# Patient Record
Sex: MALE | Race: WHITE | NOT HISPANIC OR LATINO | Employment: STUDENT | URBAN - METROPOLITAN AREA
[De-identification: names, ages, dates, MRNs, and addresses within clinical notes are randomized per-mention and may not be internally consistent; named-entity substitution may affect disease eponyms.]

---

## 2017-02-28 ENCOUNTER — HOSPITAL ENCOUNTER (INPATIENT)
Facility: HOSPITAL | Age: 22
LOS: 5 days | Discharge: HOME | DRG: 871 | End: 2017-03-05
Attending: EMERGENCY MEDICINE | Admitting: INTERNAL MEDICINE
Payer: COMMERCIAL

## 2017-02-28 DIAGNOSIS — R50.9 FEVER: ICD-10-CM

## 2017-02-28 DIAGNOSIS — Z99.11 ON MECHANICALLY ASSISTED VENTILATION: ICD-10-CM

## 2017-02-28 DIAGNOSIS — G40.311 INTRACTABLE GENERALIZED IDIOPATHIC EPILEPSY WITH STATUS EPILEPTICUS: ICD-10-CM

## 2017-02-28 DIAGNOSIS — J02.0 STREP PHARYNGITIS: ICD-10-CM

## 2017-02-28 DIAGNOSIS — A41.9 SEPSIS, DUE TO UNSPECIFIED ORGANISM: Primary | ICD-10-CM

## 2017-02-28 DIAGNOSIS — J69.0 ASPIRATION PNEUMONIA, UNSPECIFIED ASPIRATION PNEUMONIA TYPE, UNSPECIFIED LATERALITY, UNSPECIFIED PART OF LUNG: ICD-10-CM

## 2017-02-28 DIAGNOSIS — R06.03 RESPIRATORY DISTRESS: ICD-10-CM

## 2017-02-28 DIAGNOSIS — R79.0 LOW SERUM PHOSPHORUS FOR AGE: ICD-10-CM

## 2017-02-28 DIAGNOSIS — J69.0 ASPIRATION PNEUMONIA OF BOTH UPPER LOBES DUE TO GASTRIC SECRETIONS: ICD-10-CM

## 2017-02-28 DIAGNOSIS — G40.909 NONINTRACTABLE EPILEPSY WITHOUT STATUS EPILEPTICUS, UNSPECIFIED EPILEPSY TYPE: ICD-10-CM

## 2017-02-28 DIAGNOSIS — R56.9 SEIZURE: ICD-10-CM

## 2017-02-28 DIAGNOSIS — A41.9 SEPSIS: ICD-10-CM

## 2017-02-28 DIAGNOSIS — R62.50 DEVELOPMENTAL DELAY: ICD-10-CM

## 2017-02-28 DIAGNOSIS — F84.0 AUTISM: ICD-10-CM

## 2017-02-28 LAB
ABO + RH BLD: NORMAL
ALBUMIN SERPL BCP-MCNC: 1.6 G/DL
ALBUMIN SERPL BCP-MCNC: 2.8 G/DL
ALBUMIN SERPL BCP-MCNC: 3 G/DL
ALBUMIN SERPL BCP-MCNC: 3.1 G/DL
ALLENS TEST: ABNORMAL
ALP SERPL-CCNC: 49 U/L
ALP SERPL-CCNC: 78 U/L
ALP SERPL-CCNC: 86 U/L
ALP SERPL-CCNC: 88 U/L
ALP SERPL-CCNC: 94 U/L
ALP SERPL-CCNC: 94 U/L
ALT SERPL W/O P-5'-P-CCNC: 20 U/L
ALT SERPL W/O P-5'-P-CCNC: 41 U/L
ALT SERPL W/O P-5'-P-CCNC: 43 U/L
ALT SERPL W/O P-5'-P-CCNC: 43 U/L
ALT SERPL W/O P-5'-P-CCNC: 45 U/L
ALT SERPL W/O P-5'-P-CCNC: 45 U/L
ANION GAP SERPL CALC-SCNC: 3 MMOL/L
ANION GAP SERPL CALC-SCNC: 5 MMOL/L
ANION GAP SERPL CALC-SCNC: 6 MMOL/L
ANION GAP SERPL CALC-SCNC: 7 MMOL/L
ANISOCYTOSIS BLD QL SMEAR: SLIGHT
APTT BLDCRRT: 27.5 SEC
AST SERPL-CCNC: 24 U/L
AST SERPL-CCNC: 49 U/L
AST SERPL-CCNC: 52 U/L
AST SERPL-CCNC: 58 U/L
AST SERPL-CCNC: 58 U/L
AST SERPL-CCNC: 65 U/L
BASOPHILS # BLD AUTO: ABNORMAL K/UL
BASOPHILS NFR BLD: 0 %
BILIRUB SERPL-MCNC: 0.3 MG/DL
BILIRUB SERPL-MCNC: 0.5 MG/DL
BILIRUB SERPL-MCNC: 0.6 MG/DL
BILIRUB SERPL-MCNC: 0.8 MG/DL
BILIRUB UR QL STRIP: NEGATIVE
BLD GP AB SCN CELLS X3 SERPL QL: NORMAL
BUN SERPL-MCNC: 11 MG/DL
BUN SERPL-MCNC: 11 MG/DL
BUN SERPL-MCNC: 12 MG/DL
BUN SERPL-MCNC: 7 MG/DL
BUN SERPL-MCNC: 8 MG/DL
BUN SERPL-MCNC: 9 MG/DL
CA-I BLDV-SCNC: 1.08 MMOL/L
CALCIUM SERPL-MCNC: 4 MG/DL
CALCIUM SERPL-MCNC: 6.8 MG/DL
CALCIUM SERPL-MCNC: 6.9 MG/DL
CALCIUM SERPL-MCNC: 6.9 MG/DL
CALCIUM SERPL-MCNC: 7.1 MG/DL
CALCIUM SERPL-MCNC: 7.2 MG/DL
CHLORIDE SERPL-SCNC: 111 MMOL/L
CHLORIDE SERPL-SCNC: 113 MMOL/L
CHLORIDE SERPL-SCNC: 114 MMOL/L
CHLORIDE SERPL-SCNC: 114 MMOL/L
CHLORIDE SERPL-SCNC: 116 MMOL/L
CHLORIDE SERPL-SCNC: 129 MMOL/L
CLARITY UR REFRACT.AUTO: CLEAR
CO2 SERPL-SCNC: 11 MMOL/L
CO2 SERPL-SCNC: 15 MMOL/L
CO2 SERPL-SCNC: 17 MMOL/L
CO2 SERPL-SCNC: 17 MMOL/L
CO2 SERPL-SCNC: 19 MMOL/L
CO2 SERPL-SCNC: 19 MMOL/L
COLOR UR AUTO: YELLOW
CREAT SERPL-MCNC: 0.4 MG/DL
CREAT SERPL-MCNC: 0.6 MG/DL
CREAT SERPL-MCNC: 0.7 MG/DL
DELSYS: ABNORMAL
DIFFERENTIAL METHOD: ABNORMAL
EOSINOPHIL # BLD AUTO: ABNORMAL K/UL
EOSINOPHIL NFR BLD: 0 %
ERYTHROCYTE [DISTWIDTH] IN BLOOD BY AUTOMATED COUNT: 13.5 %
ERYTHROCYTE [DISTWIDTH] IN BLOOD BY AUTOMATED COUNT: 13.6 %
ERYTHROCYTE [DISTWIDTH] IN BLOOD BY AUTOMATED COUNT: 13.6 %
ERYTHROCYTE [SEDIMENTATION RATE] IN BLOOD BY WESTERGREN METHOD: 16 MM/H
ERYTHROCYTE [SEDIMENTATION RATE] IN BLOOD BY WESTERGREN METHOD: 24 MM/H
EST. GFR  (AFRICAN AMERICAN): >60 ML/MIN/1.73 M^2
EST. GFR  (NON AFRICAN AMERICAN): >60 ML/MIN/1.73 M^2
FIO2: 100
FIO2: 50
GLUCOSE SERPL-MCNC: 103 MG/DL
GLUCOSE SERPL-MCNC: 103 MG/DL
GLUCOSE SERPL-MCNC: 69 MG/DL
GLUCOSE SERPL-MCNC: 82 MG/DL
GLUCOSE SERPL-MCNC: 87 MG/DL
GLUCOSE SERPL-MCNC: 98 MG/DL
GLUCOSE UR QL STRIP: NEGATIVE
HCO3 UR-SCNC: 18.2 MMOL/L (ref 24–28)
HCO3 UR-SCNC: 18.8 MMOL/L (ref 24–28)
HCO3 UR-SCNC: 19.8 MMOL/L (ref 24–28)
HCT VFR BLD AUTO: 21.1 %
HCT VFR BLD AUTO: 33.8 %
HCT VFR BLD AUTO: 34.2 %
HGB BLD-MCNC: 11.1 G/DL
HGB BLD-MCNC: 11.4 G/DL
HGB BLD-MCNC: 6.9 G/DL
HGB UR QL STRIP: NEGATIVE
INR PPP: 1.2
KETONES UR QL STRIP: NEGATIVE
LACTATE SERPL-SCNC: 2.4 MMOL/L
LACTATE SERPL-SCNC: 2.5 MMOL/L
LEUKOCYTE ESTERASE UR QL STRIP: NEGATIVE
LYMPHOCYTES # BLD AUTO: ABNORMAL K/UL
LYMPHOCYTES NFR BLD: 10 %
LYMPHOCYTES NFR BLD: 16 %
LYMPHOCYTES NFR BLD: 4 %
MAGNESIUM SERPL-MCNC: 1.9 MG/DL
MAGNESIUM SERPL-MCNC: 2.1 MG/DL
MAGNESIUM SERPL-MCNC: <0.7 MG/DL
MCH RBC QN AUTO: 30.3 PG
MCH RBC QN AUTO: 30.7 PG
MCH RBC QN AUTO: 30.8 PG
MCHC RBC AUTO-ENTMCNC: 32.7 %
MCHC RBC AUTO-ENTMCNC: 32.8 %
MCHC RBC AUTO-ENTMCNC: 33.3 %
MCV RBC AUTO: 92 FL
MCV RBC AUTO: 93 FL
MCV RBC AUTO: 94 FL
METAMYELOCYTES NFR BLD MANUAL: 1 %
METAMYELOCYTES NFR BLD MANUAL: 4 %
MIN VOL: 7.01
MIN VOL: 9.01
MODE: ABNORMAL
MODE: ABNORMAL
MONOCYTES # BLD AUTO: ABNORMAL K/UL
MONOCYTES NFR BLD: 1 %
MONOCYTES NFR BLD: 2 %
MONOCYTES NFR BLD: 4 %
NEUTROPHILS NFR BLD: 65 %
NEUTROPHILS NFR BLD: 69 %
NEUTROPHILS NFR BLD: 80 %
NEUTS BAND NFR BLD MANUAL: 13 %
NEUTS BAND NFR BLD MANUAL: 14 %
NEUTS BAND NFR BLD MANUAL: 17 %
NITRITE UR QL STRIP: NEGATIVE
PCO2 BLDA: 29.4 MMHG (ref 35–45)
PCO2 BLDA: 39 MMHG (ref 35–45)
PCO2 BLDA: 43.9 MMHG (ref 35–45)
PEEP: 5
PEEP: 5
PH SMN: 7.26 [PH] (ref 7.35–7.45)
PH SMN: 7.28 [PH] (ref 7.35–7.45)
PH SMN: 7.42 [PH] (ref 7.35–7.45)
PH UR STRIP: 7 [PH] (ref 5–8)
PIP: 20
PIP: 24
PLATELET # BLD AUTO: 122 K/UL
PLATELET # BLD AUTO: 128 K/UL
PLATELET # BLD AUTO: 71 K/UL
PLATELET BLD QL SMEAR: ABNORMAL
PMV BLD AUTO: 10.2 FL
PMV BLD AUTO: 10.4 FL
PMV BLD AUTO: 9.7 FL
PO2 BLDA: 147 MMHG (ref 80–100)
PO2 BLDA: 152 MMHG (ref 80–100)
PO2 BLDA: 263 MMHG (ref 80–100)
POC BE: -6 MMOL/L
POC BE: -7 MMOL/L
POC BE: -9 MMOL/L
POC SATURATED O2: 100 % (ref 95–100)
POC SATURATED O2: 99 % (ref 95–100)
POC SATURATED O2: 99 % (ref 95–100)
POC TCO2: 19 MMOL/L (ref 23–27)
POC TCO2: 20 MMOL/L (ref 23–27)
POC TCO2: 21 MMOL/L (ref 23–27)
POCT GLUCOSE: 101 MG/DL (ref 70–110)
POCT GLUCOSE: 91 MG/DL (ref 70–110)
POCT GLUCOSE: 92 MG/DL (ref 70–110)
POTASSIUM SERPL-SCNC: 3.7 MMOL/L
POTASSIUM SERPL-SCNC: 4.1 MMOL/L
POTASSIUM SERPL-SCNC: 4.5 MMOL/L
POTASSIUM SERPL-SCNC: 5.4 MMOL/L
POTASSIUM SERPL-SCNC: 5.4 MMOL/L
POTASSIUM SERPL-SCNC: <2 MMOL/L
PROT SERPL-MCNC: 2.8 G/DL
PROT SERPL-MCNC: 5.1 G/DL
PROT SERPL-MCNC: 5.6 G/DL
PROT SERPL-MCNC: 5.7 G/DL
PROT UR QL STRIP: ABNORMAL
PROTHROMBIN TIME: 12.8 SEC
RBC # BLD AUTO: 2.28 M/UL
RBC # BLD AUTO: 3.6 M/UL
RBC # BLD AUTO: 3.71 M/UL
SAMPLE: ABNORMAL
SITE: ABNORMAL
SODIUM SERPL-SCNC: 137 MMOL/L
SODIUM SERPL-SCNC: 138 MMOL/L
SODIUM SERPL-SCNC: 138 MMOL/L
SODIUM SERPL-SCNC: 143 MMOL/L
SP GR UR STRIP: 1.02 (ref 1–1.03)
SP02: 100
SP02: 100
URN SPEC COLLECT METH UR: ABNORMAL
UROBILINOGEN UR STRIP-ACNC: NEGATIVE EU/DL
VANCOMYCIN SERPL-MCNC: 10.7 UG/ML
VT: 360
VT: 360
WBC # BLD AUTO: 10.94 K/UL
WBC # BLD AUTO: 2.94 K/UL
WBC # BLD AUTO: 7.8 K/UL

## 2017-02-28 PROCEDURE — 87205 SMEAR GRAM STAIN: CPT

## 2017-02-28 PROCEDURE — 63600175 PHARM REV CODE 636 W HCPCS: Performed by: STUDENT IN AN ORGANIZED HEALTH CARE EDUCATION/TRAINING PROGRAM

## 2017-02-28 PROCEDURE — 99900035 HC TECH TIME PER 15 MIN (STAT)

## 2017-02-28 PROCEDURE — 25000003 PHARM REV CODE 250: Performed by: EMERGENCY MEDICINE

## 2017-02-28 PROCEDURE — 99292 CRITICAL CARE ADDL 30 MIN: CPT | Mod: ,,, | Performed by: EMERGENCY MEDICINE

## 2017-02-28 PROCEDURE — 86901 BLOOD TYPING SEROLOGIC RH(D): CPT

## 2017-02-28 PROCEDURE — 96376 TX/PRO/DX INJ SAME DRUG ADON: CPT

## 2017-02-28 PROCEDURE — 89220 SPUTUM SPECIMEN COLLECTION: CPT

## 2017-02-28 PROCEDURE — 83735 ASSAY OF MAGNESIUM: CPT

## 2017-02-28 PROCEDURE — 94761 N-INVAS EAR/PLS OXIMETRY MLT: CPT

## 2017-02-28 PROCEDURE — 82330 ASSAY OF CALCIUM: CPT

## 2017-02-28 PROCEDURE — 25000003 PHARM REV CODE 250: Performed by: ANESTHESIOLOGY

## 2017-02-28 PROCEDURE — 83735 ASSAY OF MAGNESIUM: CPT | Mod: 91

## 2017-02-28 PROCEDURE — 83605 ASSAY OF LACTIC ACID: CPT

## 2017-02-28 PROCEDURE — 5A1945Z RESPIRATORY VENTILATION, 24-96 CONSECUTIVE HOURS: ICD-10-PCS | Performed by: EMERGENCY MEDICINE

## 2017-02-28 PROCEDURE — 85027 COMPLETE CBC AUTOMATED: CPT

## 2017-02-28 PROCEDURE — 83605 ASSAY OF LACTIC ACID: CPT | Mod: 91

## 2017-02-28 PROCEDURE — 63600175 PHARM REV CODE 636 W HCPCS: Performed by: EMERGENCY MEDICINE

## 2017-02-28 PROCEDURE — 99291 CRITICAL CARE FIRST HOUR: CPT

## 2017-02-28 PROCEDURE — 27000221 HC OXYGEN, UP TO 24 HOURS

## 2017-02-28 PROCEDURE — 20000000 HC ICU ROOM

## 2017-02-28 PROCEDURE — 25000003 PHARM REV CODE 250: Performed by: INTERNAL MEDICINE

## 2017-02-28 PROCEDURE — 80053 COMPREHEN METABOLIC PANEL: CPT | Mod: 91

## 2017-02-28 PROCEDURE — 31500 INSERT EMERGENCY AIRWAY: CPT | Mod: ,,, | Performed by: EMERGENCY MEDICINE

## 2017-02-28 PROCEDURE — 94002 VENT MGMT INPAT INIT DAY: CPT

## 2017-02-28 PROCEDURE — 36600 WITHDRAWAL OF ARTERIAL BLOOD: CPT

## 2017-02-28 PROCEDURE — 02HV33Z INSERTION OF INFUSION DEVICE INTO SUPERIOR VENA CAVA, PERCUTANEOUS APPROACH: ICD-10-PCS | Performed by: EMERGENCY MEDICINE

## 2017-02-28 PROCEDURE — 87633 RESP VIRUS 12-25 TARGETS: CPT | Mod: 91

## 2017-02-28 PROCEDURE — 96365 THER/PROPH/DIAG IV INF INIT: CPT | Mod: 59

## 2017-02-28 PROCEDURE — 80175 DRUG SCREEN QUAN LAMOTRIGINE: CPT

## 2017-02-28 PROCEDURE — 25000003 PHARM REV CODE 250

## 2017-02-28 PROCEDURE — 63600175 PHARM REV CODE 636 W HCPCS

## 2017-02-28 PROCEDURE — 82803 BLOOD GASES ANY COMBINATION: CPT

## 2017-02-28 PROCEDURE — 86900 BLOOD TYPING SEROLOGIC ABO: CPT

## 2017-02-28 PROCEDURE — 80202 ASSAY OF VANCOMYCIN: CPT

## 2017-02-28 PROCEDURE — 81003 URINALYSIS AUTO W/O SCOPE: CPT

## 2017-02-28 PROCEDURE — 36415 COLL VENOUS BLD VENIPUNCTURE: CPT

## 2017-02-28 PROCEDURE — 80201 ASSAY OF TOPIRAMATE: CPT

## 2017-02-28 PROCEDURE — 85610 PROTHROMBIN TIME: CPT

## 2017-02-28 PROCEDURE — 87040 BLOOD CULTURE FOR BACTERIA: CPT

## 2017-02-28 PROCEDURE — 96368 THER/DIAG CONCURRENT INF: CPT

## 2017-02-28 PROCEDURE — 63600175 PHARM REV CODE 636 W HCPCS: Performed by: INTERNAL MEDICINE

## 2017-02-28 PROCEDURE — 99292 CRITICAL CARE ADDL 30 MIN: CPT

## 2017-02-28 PROCEDURE — 0BH17EZ INSERTION OF ENDOTRACHEAL AIRWAY INTO TRACHEA, VIA NATURAL OR ARTIFICIAL OPENING: ICD-10-PCS | Performed by: EMERGENCY MEDICINE

## 2017-02-28 PROCEDURE — 82962 GLUCOSE BLOOD TEST: CPT

## 2017-02-28 PROCEDURE — 96367 TX/PROPH/DG ADDL SEQ IV INF: CPT

## 2017-02-28 PROCEDURE — 27200966 HC CLOSED SUCTION SYSTEM

## 2017-02-28 PROCEDURE — 99900026 HC AIRWAY MAINTENANCE (STAT)

## 2017-02-28 PROCEDURE — 99291 CRITICAL CARE FIRST HOUR: CPT | Mod: 25,,, | Performed by: EMERGENCY MEDICINE

## 2017-02-28 PROCEDURE — 25000003 PHARM REV CODE 250: Performed by: STUDENT IN AN ORGANIZED HEALTH CARE EDUCATION/TRAINING PROGRAM

## 2017-02-28 PROCEDURE — 93010 ELECTROCARDIOGRAM REPORT: CPT | Mod: ,,, | Performed by: INTERNAL MEDICINE

## 2017-02-28 PROCEDURE — 80053 COMPREHEN METABOLIC PANEL: CPT

## 2017-02-28 PROCEDURE — 95951 HC EEG MONITORING/VIDEO RECORD: CPT

## 2017-02-28 PROCEDURE — 95957 EEG DIGITAL ANALYSIS: CPT

## 2017-02-28 PROCEDURE — 96375 TX/PRO/DX INJ NEW DRUG ADDON: CPT

## 2017-02-28 PROCEDURE — 87086 URINE CULTURE/COLONY COUNT: CPT

## 2017-02-28 PROCEDURE — 87070 CULTURE OTHR SPECIMN AEROBIC: CPT

## 2017-02-28 PROCEDURE — 85007 BL SMEAR W/DIFF WBC COUNT: CPT | Mod: 91

## 2017-02-28 PROCEDURE — 31500 INSERT EMERGENCY AIRWAY: CPT

## 2017-02-28 PROCEDURE — 85730 THROMBOPLASTIN TIME PARTIAL: CPT

## 2017-02-28 RX ORDER — DEXTROSE MONOHYDRATE AND SODIUM CHLORIDE 5; .9 G/100ML; G/100ML
INJECTION, SOLUTION INTRAVENOUS CONTINUOUS
Status: DISCONTINUED | OUTPATIENT
Start: 2017-02-28 | End: 2017-02-28

## 2017-02-28 RX ORDER — FENTANYL CITRATE 50 UG/ML
50 INJECTION, SOLUTION INTRAMUSCULAR; INTRAVENOUS
Status: COMPLETED | OUTPATIENT
Start: 2017-02-28 | End: 2017-02-28

## 2017-02-28 RX ORDER — SODIUM CHLORIDE 9 MG/ML
1000 INJECTION, SOLUTION INTRAVENOUS
Status: COMPLETED | OUTPATIENT
Start: 2017-02-28 | End: 2017-02-28

## 2017-02-28 RX ORDER — PHENOBARBITAL 20 MG/5ML
64.8 ELIXIR ORAL NIGHTLY
Status: DISCONTINUED | OUTPATIENT
Start: 2017-02-28 | End: 2017-03-01

## 2017-02-28 RX ORDER — TOPIRAMATE 200 MG/1
200 TABLET ORAL EVERY 12 HOURS
Status: DISCONTINUED | OUTPATIENT
Start: 2017-02-28 | End: 2017-03-05 | Stop reason: HOSPADM

## 2017-02-28 RX ORDER — PROPOFOL 10 MG/ML
5 INJECTION, EMULSION INTRAVENOUS ONCE
Status: COMPLETED | OUTPATIENT
Start: 2017-02-28 | End: 2017-02-28

## 2017-02-28 RX ORDER — CLONAZEPAM 0.5 MG/1
0.5 TABLET ORAL NIGHTLY
Status: DISCONTINUED | OUTPATIENT
Start: 2017-02-28 | End: 2017-03-05 | Stop reason: HOSPADM

## 2017-02-28 RX ORDER — SODIUM CHLORIDE 0.9 % (FLUSH) 0.9 %
3 SYRINGE (ML) INJECTION EVERY 8 HOURS
Status: DISCONTINUED | OUTPATIENT
Start: 2017-02-28 | End: 2017-03-05 | Stop reason: HOSPADM

## 2017-02-28 RX ORDER — ENOXAPARIN SODIUM 100 MG/ML
40 INJECTION SUBCUTANEOUS EVERY 24 HOURS
Status: DISCONTINUED | OUTPATIENT
Start: 2017-02-28 | End: 2017-03-05 | Stop reason: HOSPADM

## 2017-02-28 RX ORDER — PROPOFOL 10 MG/ML
5 INJECTION, EMULSION INTRAVENOUS CONTINUOUS
Status: DISCONTINUED | OUTPATIENT
Start: 2017-02-28 | End: 2017-03-03

## 2017-02-28 RX ORDER — FENTANYL CITRATE 50 UG/ML
100 INJECTION, SOLUTION INTRAMUSCULAR; INTRAVENOUS ONCE
Status: COMPLETED | OUTPATIENT
Start: 2017-02-28 | End: 2017-02-28

## 2017-02-28 RX ORDER — FENTANYL CITRATE 50 UG/ML
INJECTION, SOLUTION INTRAMUSCULAR; INTRAVENOUS
Status: DISPENSED
Start: 2017-02-28 | End: 2017-02-28

## 2017-02-28 RX ORDER — LAMOTRIGINE 100 MG/1
200 TABLET ORAL EVERY 12 HOURS
Status: DISCONTINUED | OUTPATIENT
Start: 2017-02-28 | End: 2017-03-05 | Stop reason: HOSPADM

## 2017-02-28 RX ORDER — LORAZEPAM 2 MG/ML
2 INJECTION INTRAMUSCULAR
Status: DISCONTINUED | OUTPATIENT
Start: 2017-02-28 | End: 2017-03-05 | Stop reason: HOSPADM

## 2017-02-28 RX ORDER — DIAZEPAM 5 MG/1
10 TABLET ORAL EVERY 12 HOURS
Status: DISCONTINUED | OUTPATIENT
Start: 2017-02-28 | End: 2017-03-05 | Stop reason: HOSPADM

## 2017-02-28 RX ORDER — ETOMIDATE 2 MG/ML
INJECTION INTRAVENOUS
Status: COMPLETED
Start: 2017-02-28 | End: 2017-02-28

## 2017-02-28 RX ORDER — ETOMIDATE 2 MG/ML
0.3 INJECTION INTRAVENOUS ONCE
Status: DISCONTINUED | OUTPATIENT
Start: 2017-02-28 | End: 2017-03-03

## 2017-02-28 RX ORDER — SUCCINYLCHOLINE CHLORIDE 20 MG/ML
60 INJECTION INTRAMUSCULAR; INTRAVENOUS ONCE
Status: COMPLETED | OUTPATIENT
Start: 2017-02-28 | End: 2017-02-28

## 2017-02-28 RX ORDER — POTASSIUM CHLORIDE 7.45 MG/ML
10 INJECTION INTRAVENOUS
Status: DISCONTINUED | OUTPATIENT
Start: 2017-02-28 | End: 2017-02-28

## 2017-02-28 RX ORDER — CHLORHEXIDINE GLUCONATE ORAL RINSE 1.2 MG/ML
15 SOLUTION DENTAL 2 TIMES DAILY
Status: DISCONTINUED | OUTPATIENT
Start: 2017-02-28 | End: 2017-03-03

## 2017-02-28 RX ORDER — POTASSIUM CHLORIDE 14.9 MG/ML
20 INJECTION INTRAVENOUS ONCE
Status: COMPLETED | OUTPATIENT
Start: 2017-02-28 | End: 2017-02-28

## 2017-02-28 RX ORDER — MAGNESIUM SULFATE HEPTAHYDRATE 40 MG/ML
2 INJECTION, SOLUTION INTRAVENOUS ONCE
Status: COMPLETED | OUTPATIENT
Start: 2017-02-28 | End: 2017-02-28

## 2017-02-28 RX ORDER — FAMOTIDINE 20 MG/1
20 TABLET, FILM COATED ORAL EVERY 12 HOURS
Status: DISCONTINUED | OUTPATIENT
Start: 2017-02-28 | End: 2017-03-03

## 2017-02-28 RX ORDER — ACETAMINOPHEN 10 MG/ML
400 INJECTION, SOLUTION INTRAVENOUS ONCE
Status: COMPLETED | OUTPATIENT
Start: 2017-02-28 | End: 2017-02-28

## 2017-02-28 RX ORDER — FENTANYL CITRAT/DEXTROSE 5%/PF 100 MCG/10
PATIENT CONTROLLED ANALGESIA SYRINGE INTRAVENOUS CONTINUOUS
Status: DISCONTINUED | OUTPATIENT
Start: 2017-02-28 | End: 2017-03-03

## 2017-02-28 RX ORDER — ACETAMINOPHEN 325 MG/1
650 TABLET ORAL EVERY 6 HOURS PRN
Status: DISCONTINUED | OUTPATIENT
Start: 2017-03-01 | End: 2017-03-05 | Stop reason: HOSPADM

## 2017-02-28 RX ORDER — LEVETIRACETAM 10 MG/ML
1000 INJECTION INTRAVASCULAR
Status: COMPLETED | OUTPATIENT
Start: 2017-02-28 | End: 2017-02-28

## 2017-02-28 RX ORDER — LORAZEPAM 2 MG/ML
INJECTION INTRAMUSCULAR
Status: COMPLETED
Start: 2017-02-28 | End: 2017-02-28

## 2017-02-28 RX ADMIN — PHENOBARBITAL 64.8 MG: 20 ELIXIR ORAL at 09:02

## 2017-02-28 RX ADMIN — ETOMIDATE 40 MG: 2 INJECTION, SOLUTION INTRAVENOUS at 11:02

## 2017-02-28 RX ADMIN — ACETAMINOPHEN 400 MG: 10 INJECTION, SOLUTION INTRAVENOUS at 06:02

## 2017-02-28 RX ADMIN — SODIUM CHLORIDE 1000 ML: 0.9 INJECTION, SOLUTION INTRAVENOUS at 06:02

## 2017-02-28 RX ADMIN — MAGNESIUM SULFATE IN WATER 2 G: 40 INJECTION, SOLUTION INTRAVENOUS at 11:02

## 2017-02-28 RX ADMIN — DIAZEPAM 10 MG: 5 TABLET ORAL at 05:02

## 2017-02-28 RX ADMIN — LEVETIRACETAM 1000 MG: 10 INJECTION INTRAVENOUS at 06:02

## 2017-02-28 RX ADMIN — LORAZEPAM 2 MG: 2 INJECTION, SOLUTION INTRAMUSCULAR; INTRAVENOUS at 03:02

## 2017-02-28 RX ADMIN — VANCOMYCIN HYDROCHLORIDE 750 MG: 1 INJECTION, POWDER, LYOPHILIZED, FOR SOLUTION INTRAVENOUS at 10:02

## 2017-02-28 RX ADMIN — CALCIUM GLUCONATE 1000 MG: 94 INJECTION, SOLUTION INTRAVENOUS at 01:02

## 2017-02-28 RX ADMIN — POTASSIUM CHLORIDE 20 MEQ: 200 INJECTION, SOLUTION INTRAVENOUS at 11:02

## 2017-02-28 RX ADMIN — FENTANYL CITRATE 100 MCG: 50 INJECTION INTRAMUSCULAR; INTRAVENOUS at 11:02

## 2017-02-28 RX ADMIN — SODIUM CHLORIDE 1000 ML: 0.9 INJECTION, SOLUTION INTRAVENOUS at 07:02

## 2017-02-28 RX ADMIN — PROPOFOL 40 MCG/KG/MIN: 10 INJECTION, EMULSION INTRAVENOUS at 12:02

## 2017-02-28 RX ADMIN — FENTANYL CITRATE 50 MCG: 50 INJECTION INTRAMUSCULAR; INTRAVENOUS at 12:02

## 2017-02-28 RX ADMIN — SODIUM CHLORIDE 1000 ML: 0.9 INJECTION, SOLUTION INTRAVENOUS at 05:02

## 2017-02-28 RX ADMIN — POTASSIUM CHLORIDE 10 MEQ: 10 INJECTION, SOLUTION INTRAVENOUS at 11:02

## 2017-02-28 RX ADMIN — PIPERACILLIN SODIUM AND TAZOBACTAM SODIUM 4.5 G: 4; .5 INJECTION, POWDER, LYOPHILIZED, FOR SOLUTION INTRAVENOUS at 08:02

## 2017-02-28 RX ADMIN — ACETAMINOPHEN 650 MG: 325 TABLET, FILM COATED ORAL at 11:02

## 2017-02-28 RX ADMIN — LORAZEPAM 1 MG: 2 INJECTION, SOLUTION INTRAMUSCULAR; INTRAVENOUS at 06:02

## 2017-02-28 RX ADMIN — Medication 3 ML: at 02:02

## 2017-02-28 RX ADMIN — ENOXAPARIN SODIUM 40 MG: 100 INJECTION SUBCUTANEOUS at 06:02

## 2017-02-28 RX ADMIN — VANCOMYCIN HYDROCHLORIDE 750 MG: 1 INJECTION, POWDER, LYOPHILIZED, FOR SOLUTION INTRAVENOUS at 12:02

## 2017-02-28 RX ADMIN — Medication 3 ML: at 09:02

## 2017-02-28 RX ADMIN — SODIUM CHLORIDE 1000 ML: 0.9 INJECTION, SOLUTION INTRAVENOUS at 09:02

## 2017-02-28 RX ADMIN — PIPERACILLIN SODIUM AND TAZOBACTAM SODIUM 4.5 G: 4; .5 INJECTION, POWDER, LYOPHILIZED, FOR SOLUTION INTRAVENOUS at 02:02

## 2017-02-28 RX ADMIN — Medication 12.5 MCG/HR: at 02:02

## 2017-02-28 RX ADMIN — TOPIRAMATE 200 MG: 200 TABLET, FILM COATED ORAL at 05:02

## 2017-02-28 RX ADMIN — FAMOTIDINE 20 MG: 20 TABLET, FILM COATED ORAL at 09:02

## 2017-02-28 RX ADMIN — CHLORHEXIDINE GLUCONATE 15 ML: 1.2 RINSE ORAL at 09:02

## 2017-02-28 RX ADMIN — LAMOTRIGINE 200 MG: 100 TABLET ORAL at 05:02

## 2017-02-28 RX ADMIN — SUCCINYLCHOLINE CHLORIDE 60 MG: 20 INJECTION, SOLUTION INTRAMUSCULAR; INTRAVENOUS at 11:02

## 2017-02-28 RX ADMIN — CLONAZEPAM 0.5 MG: 0.5 TABLET ORAL at 09:02

## 2017-02-28 NOTE — SUBJECTIVE & OBJECTIVE
Review of Systems   Constitutional: Positive for activity change, appetite change and fever. Negative for chills, diaphoresis and fatigue.   HENT: Positive for congestion. Negative for dental problem and drooling.    Eyes: Negative for pain, discharge and itching.   Respiratory: Positive for cough. Negative for choking, chest tightness, shortness of breath and stridor.    Cardiovascular: Negative for chest pain.   Gastrointestinal: Negative for abdominal distention, abdominal pain, diarrhea and nausea.   Endocrine: Negative for cold intolerance and heat intolerance.   Genitourinary: Negative for dysuria and enuresis.   Musculoskeletal: Negative for gait problem, joint swelling and myalgias.   Skin: Negative for color change and pallor.   Neurological: Positive for seizures and speech difficulty. Negative for syncope and numbness.     Objective:     Vital Signs (Most Recent):  Temp: (!) 82.6 °F (28.1 °C) (02/28/17 1400)  Pulse: 97 (02/28/17 1400)  Resp: (!) 24 (02/28/17 1400)  BP: 123/80 (02/28/17 1353)  SpO2: 100 % (02/28/17 1400) Vital Signs (24h Range):  Temp:  [82.6 °F (28.1 °C)-103 °F (39.4 °C)] 82.6 °F (28.1 °C)  Pulse:  [] 97  Resp:  [20-25] 24  SpO2:  [84 %-100 %] 100 %  BP: ()/(34-80) 123/80   Weight: 42.6 kg (94 lb)  Body mass index is 16.65 kg/(m^2).    No intake or output data in the 24 hours ending 02/28/17 1413    Physical Exam   Constitutional: He appears well-developed and well-nourished.   HENT:   Head: Normocephalic and atraumatic.   Right Ear: External ear normal.   Eyes: EOM are normal. Pupils are equal, round, and reactive to light. Right eye exhibits no discharge. Left eye exhibits no discharge.   Neck: Normal range of motion. Neck supple. No JVD present. No thyromegaly present.   Cardiovascular: Normal rate and regular rhythm.  Exam reveals no gallop and no friction rub.    No murmur heard.  Pulmonary/Chest: He has wheezes. He has rales.   Tachypnea    Musculoskeletal: He exhibits  no edema or deformity.   Neurological:   Look ill. Higher respiratory breathing work.    Skin: No rash noted. He is not diaphoretic. No erythema.       Vents:  Vent Mode: A/C (02/28/17 1400)  Set Rate: 16 bmp (02/28/17 1400)  Vt Set: 360 mL (02/28/17 1400)  PEEP/CPAP: 5 cmH20 (02/28/17 1400)  Oxygen Concentration (%): 100 (02/28/17 1400)  Peak Airway Pressure: 19 cmH2O (02/28/17 1400)  Total Ve: 7.8 mL (02/28/17 1400)  F/VT Ratio<105 (RSBI): (!) 55.17 (02/28/17 1400)  Lines/Drains/Airways     Central Venous Catheter Line                 CVC Triple Lumen - PreSep Cath 02/28/17 1313 right internal jugular less than 1 day          Drain                 NG/OG Tube 02/28/17 1123 18 Fr. Center mouth less than 1 day          Airway                 Airway - Non-Surgical 02/28/17 1138 Endotracheal Tube-Hi/Lo;Endotracheal Tube less than 1 day          Peripheral Intravenous Line                 Peripheral IV - Single Lumen 02/28/17 0510 Right Forearm less than 1 day         Peripheral IV - Single Lumen 02/28/17 0553 Right Forearm less than 1 day         Peripheral IV - Single Lumen 02/28/17 1055 Left Forearm less than 1 day              Significant Labs:    CBC/Anemia Profile:    Recent Labs  Lab 02/28/17  0553 02/28/17  1052 02/28/17  1224   WBC 2.94* 7.80 10.94   HGB 6.9* 11.4* 11.1*   HCT 21.1* 34.2* 33.8*   PLT 71* 122* 128*   MCV 93 92 94   RDW 13.5 13.6 13.6        Chemistries:    Recent Labs  Lab 02/28/17  0752 02/28/17  1052 02/28/17  1224    137 138  138   K <2.0* 4.5 5.4*  5.4*   * 116* 114*  114*   CO2 11* 15* 17*  17*   BUN 8 12 11  11   CREATININE 0.4* 0.7 0.7  0.7   CALCIUM 4.0* 6.8* 6.9*  6.9*   ALBUMIN 1.6* 3.0* 3.1*  3.1*   PROT 2.8* 5.6* 5.7*  5.7*   BILITOT 0.3 0.5 0.6  0.6   ALKPHOS 49* 88 94  94   ALT 20 43 45*  45*   AST 24 65* 58*  58*   MG <0.7*  --   --

## 2017-02-28 NOTE — PROGRESS NOTES
Pt. arrived from ED vented with a #7 ETT 24 @lips. Pt was connected to unit's vent at vent settings of AC/VC rate 16, 360 tidal vol, 100% O2, 5 peep. Will continue to monitor.

## 2017-02-28 NOTE — NURSING TRANSFER
Nursing Transfer Note      2/28/2017     Transfer From: ED    Transfer via stretcher    Transfer with  to O2, cardiac monitoring    Transported by RN, RT    Medicines sent: n    Chart send with patient: Yes    Notified: Aunt      Patient reassessed at: 2/28/17 1500 (date, time)    Upon arrival to floor: cardiac monitor applied, patient oriented to room, call bell in reach and bed in lowest position

## 2017-02-28 NOTE — ED PROVIDER NOTES
Encounter Date: 2/28/2017    SCRIBE #1 NOTE: I, Nasra Rohini, am scribing for, and in the presence of, Dr. Bradford.       History     Chief Complaint   Patient presents with    Seizures     transfer from [facility] for seizures and fever. Pt had seizure in route and was diverted to ED    Fever     Review of patient's allergies indicates:  No Known Allergies  HPI     This is a 21 y.o. male with a PMHx of developmental delay and seizure who presents with complaint of seizure. The patient was a direct admit to the pediactric [facility] at [facility] from [facility] however, the patient had a breakthrough seizure in route and when EMS called the accepting physician to inform them of this they deferred the patient to the adult ED. The patient was given versed in route which ceased his seizure like activity. The patient has a known seizure disorder and presented to the [facility] ED for breakthrough seizure x2, high fever, and 2 episodes of vomiting. At the transferring facility his carbamazepine levels were noted to be low, rapid strep was positive, and influenza swab was negative.     Past Medical History:   Diagnosis Date    Developmental delay     Mental disorder     Seizures      History reviewed. No pertinent surgical history.  No family history on file.  Social History   Substance Use Topics    Smoking status: Never Smoker    Smokeless tobacco: None    Alcohol use No     Review of Systems   Unable to perform ROS: Patient nonverbal       Physical Exam   Initial Vitals   BP Pulse Resp Temp SpO2   -- -- -- -- --            Physical Exam  Gen/Constitutional: Interactive. No acute distress Patient is resting comfortably in gurney.   Head: Normocephalic, Atraumatic  Neck: supple, no masses or LAD  Eyes: PERRLA, conjunctiva clear  Ears, Nose and Throat: No rhinorrhea or stridor.  Cardiac: Reg Rhythm, No murmur  Pulmonary: CTA Bilat, no wheezes, rhonchi, rales.  GI: Abdomen soft, non-tender,  non-distended; no rebound or guarding  : No CVA tenderness.  Musculoskeletal: Extremities warm, well perfused, no erythema, no edema  Skin: No rashes  Neuro: No focal motor or sensory deficits.  Patient is sleeping, does open eyes to voice, will not follow commands as per baseline mental status. No seizure like activity.   Psych: Normal affect    ED Course   Intubation  Date/Time: 2/28/2017 11:58 AM  Performed by: THADDEUS PENA  Authorized by: THADDEUS PENA   Indications: airway protection (and need for procedures)  Intubation method: fiberoptic oral  Patient status: paralyzed (RSI)  Preoxygenation: mask  Sedatives: etomidate  Paralytic: succinylcholine  Laryngoscope size: Mac 3  Tube size: 7.0 mm  Tube type: cuffed  Number of attempts: 1  Cricoid pressure: yes  Cords visualized: yes  Post-procedure assessment: ETCO2 monitor and chest rise  Breath sounds: clear  Cuff inflated: yes        Labs Reviewed   CBC W/ AUTO DIFFERENTIAL - Abnormal; Notable for the following:        Result Value    WBC 2.94 (*)     RBC 2.28 (*)     Hemoglobin 6.9 (*)     Hematocrit 21.1 (*)     Platelets 71 (*)     Gran% 80.0 (*)     Lymph% 4.0 (*)     Mono% 1.0 (*)     Platelet Estimate Decreased (*)     All other components within normal limits    Narrative:     PHENB added per Dr. Bradford, order ID [id number] 02/28/17 06:59   LACTIC ACID, PLASMA - Abnormal; Notable for the following:     Lactate (Lactic Acid) 2.5 (*)     All other components within normal limits   URINALYSIS - Abnormal; Notable for the following:     Protein, UA Trace (*)     All other components within normal limits   COMPREHENSIVE METABOLIC PANEL - Abnormal; Notable for the following:     Potassium <2.0 (*)     Chloride 129 (*)     CO2 11 (*)     Glucose 69 (*)     Creatinine 0.4 (*)     Calcium 4.0 (*)     Total Protein 2.8 (*)     Albumin 1.6 (*)     Alkaline Phosphatase 49 (*)     Anion Gap 3 (*)     All other components within normal limits   MAGNESIUM -  Abnormal; Notable for the following:     Magnesium <0.7 (*)     All other components within normal limits    Narrative:     Mg added per Dr. Edwards, order ID [id number] 02/28/47 10:15   CULTURE, BLOOD   CULTURE, BLOOD   CULTURE, URINE   VIRAL RESPIRATORY PANEL (ANTIGEN DETECTION)   LAMOTRIGINE LEVEL   PHENOBARBITAL LEVEL   TOPIRAMATE LEVEL   MAGNESIUM   TOPIRAMATE LEVEL    Narrative:     PHENB added per Dr. Bradford, order ID [id number] 02/28/17 06:59  ADD ON PER KATHLEEN ARIAS, ORDER #[id number]  LAMO, ORDER #[id number]   07:06  02/28/2017    LAMOTRIGINE LEVEL    Narrative:     PHENB added per Dr. Bradford, order ID [id number] 02/28/17 06:59  ADD ON PER KATHLEEN ARIAS, ORDER #[id number]  LAMO, ORDER #[id number]   07:06  02/28/2017    VANCOMYCIN, RANDOM   LACTIC ACID, PLASMA   URINALYSIS   CALCIUM, IONIZED   CBC W/ AUTO DIFFERENTIAL   COMPREHENSIVE METABOLIC PANEL   COMPREHENSIVE METABOLIC PANEL   COMPREHENSIVE METABOLIC PANEL   POCT GLUCOSE MONITORING CONTINUOUS   POCT GLUCOSE MONITORING CONTINUOUS        Imaging Results         X-Ray Chest 1 View (Final result) Result time:  02/28/17 08:07:36    Final result by Sweta Diaz MD (02/28/17 08:07:36)    Impression:     Left lower lobe pneumonia with associated volume loss      Electronically signed by: SWETA DIAZ MD  Date:     02/28/17  Time:    08:07     Narrative:    AP chest    Comparison: None    Results: Confluent airspace opacity left lower lung zone concerning for pneumonia and possible associated atelectatic changes the right hemithorax is mostly clear as well as the left upper lung zone.  No pneumothorax or definite pleural effusion.  The osseous structures appear normal.                 Medical Decision Making:   History:   Old Medical Records: I decided to obtain old medical records.  Clinical Tests:  Labs Test(s) were ordered and reviewed by me.  Radiological study(s) were ordered and reviewed by me.  Medical test(s) were ordered and reviewed by  me.      Patient presents with fever and breakthrough seizures. I considered meningitis though his neck is supple and I feel this is less likely. Ultimately I feel breakthrough seizures due to strep pharyngitis plus fever and vomiting as well as low antiepileptic medication level. There is no evidence of status epilepticus as this time. the patient was supposed to be a direct admit to the pediatric floor. When the accepting pediatric attending was informed of the patient's breakthrough seizure in route, she diverted him to the ED where he was seen on the adult side as the pediatric ED would not see him on account of his age. He received clindamycin and Rocephin at the transferring facility.           Scribe Attestation:   Scribe #1: I performed the above scribed service and the documentation accurately describes the services I performed. I attest to the accuracy of the note.    Attending Attestation:         Attending Critical Care:   Critical Care Times:   Direct Patient Care (initial evaluation, reassessments, and time considering the case)................................................................35 minutes.   Additional History from reviewing old medical records or taking additional history from the family, EMS, PCP, etc.......................10 minutes.   Ordering, Reviewing, and Interpreting Diagnostic Studies...............................................................................................................6 minutes.   Documentation..................................................................................................................................................................................8 minutes.   Consultation with other Physicians. .................................................................................................................................................12 minutes.   Consultation with the patient's family directly relating to the patient's condition,  care, and DNR status (when patient unable)......10 minutes.   ==============================================================  · Total Critical Care Time - exclusive of procedural time: 81 minutes.  ==============================================================  Critical Care Comments: Note: this critical care was provided by Dr Gamble.  Please see associated progress note     Physician Attestation for Scribe:  Physician Attestation Statement for Scribe #1: I, Dr. Bradford, reviewed documentation, as scribed by Nasra Nova in my presence, and it is both accurate and complete.                 ED Course     Clinical Impression:   The primary encounter diagnosis was Sepsis, due to unspecified organism. Diagnoses of Fever, Seizure, Aspiration pneumonia, unspecified aspiration pneumonia type, unspecified laterality, unspecified part of lung, and Strep pharyngitis were also pertinent to this visit.    Disposition:   Disposition: Admitted       Tramaine Gamble MD  02/28/17 2497

## 2017-02-28 NOTE — PROCEDURES
"Lamont Villarreal is a 21 y.o. male patient.    Temp: (!) 101 °F (38.3 °C) (02/28/17 1219)  Pulse: 100 (02/28/17 1232)  Resp: 20 (02/28/17 0731)  BP: (!) 91/51 (02/28/17 1232)  SpO2: 100 % (02/28/17 1232)  Weight: 42.6 kg (94 lb) (02/28/17 0448)       Central Line  Date/Time: 2/28/2017 1:04 PM  Location procedure was performed: [facility] [place] [facility]  Performed by: STUART EDWARDS  Assisting provider: MARIA E CASTANO  Pre-operative Diagnosis: seizure  Post-operative diagnosis: Seziure  Consent Done: Yes  Time out: Immediately prior to procedure a "time out" was called to verify the correct patient, procedure, equipment, support staff and site/side marked as required.  Indications: vascular access  Anesthesia: local infiltration    Anesthesia:  Anesthesia: local infiltration  Local Anesthetic: lidocaine 1% without epinephrine   Preparation: skin prepped with ChloraPrep  Skin prep agent dried: skin prep agent completely dried prior to procedure  Sterile barriers: all five maximum sterile barriers used - cap, mask, sterile gown, sterile gloves, and large sterile sheet  Hand hygiene: hand hygiene performed prior to central venous catheter insertion  Location details: right internal jugular  Catheter type: triple lumen  Catheter size: 7 Fr  Ultrasound guidance: yes  Vessel Caliber: medium, compressibility normal  Needle advanced into vessel with real time Ultrasound guidance.  Guidewire confirmed in vessel.  Image recorded and saved.  Sterile sheath used.  Manometry: Yes  Number of attempts: 1  Assessment: placement verified by x-ray  Technical procedures used: US  Estimated blood loss (mL): 10  Specimens: No  Implants: No  Post-procedure: line sutured,  chlorhexidine patch,  sterile dressing applied and blood return through all ports  Complications: No          Stuart Edwards  2/28/2017  "

## 2017-02-28 NOTE — PROCEDURES
[facility] ICU Fellow Endotracheal Intubation Procedure Note    02/28/2017    Lamont Villarreal    ED 03/03    Attending present in the room: ER      This procedure has been fully reviewed with the patient and consent has been obtained. yes.      Mallampati Score: N/A      Indication for endotracheal intubation: potential airway compromise.      Specific indication for intubation:  impending respiratory failure,  to facilitate other procedure       Urgency of intubation: urgent        Pre-oxygenation device used: BVM      Sedative medications used    Etomidate: yes   Propofol:   Ketamine:   Fentanyl:   Midazolam:   Lorazepam:   Lidocaine:      Paralytic medication used    Succinylcholine: yes   Rocuronium:   Vecuronium:   Cisatracuronium:      Other medications used    Neosynephrine:   Norepinephrine:   Epinephrine:   Atropine:      Ventilation between medication administration and laryngoscopy: BVM      Position of patient during intubation: supine      Laryngoscopy device used on initial intubation attempt:       Video: Glidescope      Type and size of blade used on initial intubation attempt: curved 3    Size of endotracheal tube: 7.0 cm      Endotracheal tube location:     At the teeth: 23 cm   At the lips:      ETT location confirmed by by auscultation, by CXR and ETCO2 monitor.      Cormack-Lehane Glottic view on first Attempt:       Airway description: easy      Complications: (pick any)   Esophageal intubation:   Witnessed aspiration during procedure:   Airway trauma:   Dental trauma:   New desaturation below 88%:   New hypotension SBP<90:   New bradycardia HR <40   Cardiac arrest:      Number of attempts: 1.  If multiple attempts, what additional devices were used:      Number of assistants (other physicians/proceduralists): 1.      Laura Shukla  [facility] Pulmonary/Critical Care Fellow

## 2017-02-28 NOTE — ED NOTES
Patient identifiers verified and correct for Lamont Villarreal.    LOC: The patient is lethargic, not alert and unaware of environment with an appropriate affect, the patient is oriented x 3. Pt has a history of severe autism  APPEARANCE: Patient resting comfortably and in no acute distress, patient is clean and well groomed, patient's clothing is properly fastened.  SKIN: The skin is warm and dry, color consistent with ethnicity, patient has normal skin turgor and moist mucus membranes, skin intact, no breakdown or bruising noted.  MUSCULOSKELETAL: Patient moving all extremities spontaneously, no obvious swelling or deformities noted. Pt is contracted at his arms and legs which family states is normal when he doesn't feel well.  RESPIRATORY: Airway is open and patent, respirations are spontaneous, patient has a normal effort and rate, no accessory muscle use noted, bilateral breath sounds even and clear.  CARDIAC: Patient has a tachycardiac rate and regular rhythm, no periphreal edema noted, capillary refill < 3 seconds.  ABDOMEN: Soft and non tender to palpation, no distention noted, normoactive bowel sounds present in all four quadrants.  NEUROLOGIC: Pupils equal bilaterally, eyes open spontaneously, behavior appropriate to situation, does not, does not follow commands, facial expression symmetrical, bilateral hand grasp unable to assess, purposeful motor response noted, normal sensation in all extremities when touched with a finger.

## 2017-02-28 NOTE — ED TRIAGE NOTES
Lamont Villarreal, a 21 y.o. male presents to the ED from [facility]. Pt had a seizure in route with EMS and was given 5mg versed. Pt was suppose to be direct admit but diverted to ED for further evaluation pt arrived with a condom catheter placed. Pt's family states pt has seizures when he has a fever      Chief Complaint   Patient presents with    Seizures     transfer from [facility] for seizures and fever. Pt had seizure in route and was diverted to ED    Fever     Review of patient's allergies indicates:  No Known Allergies  Past Medical History:   Diagnosis Date    Developmental delay     Mental disorder     Seizures

## 2017-02-28 NOTE — IP AVS SNAPSHOT
[facility]  [address]  [facility] [address]  Phone: [phone]           Patient Discharge Instructions     Our goal is to set you up for success. This packet includes information on your condition, medications, and your home care. It will help you to care for yourself so you don't get sicker and need to go back to the hospital.     Please ask your nurse if you have any questions.        There are many details to remember when preparing to leave the hospital. Here is what you will need to do:    1. Take your medicine. If you are prescribed medications, review your Medication List in the following pages. You may have new medications to  at the pharmacy and others that you'll need to stop taking. Review the instructions for how and when to take your medications. Talk with your doctor or nurses if you are unsure of what to do.     2. Go to your follow-up appointments. Specific follow-up information is listed in the following pages. Your may be contacted by a transition nurse or clinical provider about future appointments. Be sure we have all of the phone numbers to reach you, if needed. Please contact your provider's office if you are unable to make an appointment.     3. Watch for warning signs. Your doctor or nurse will give you detailed warning signs to watch for and when to call for assistance. These instructions may also include educational information about your condition. If you experience any of warning signs to your health, call your doctor.               [facility]  Unless otherwise directed by your provider, please contact [facility], our nurse care line that is available for 24/7 assistance.     [phone] (toll-free)    Registered nurses in the [facility] provide clinical advisement, health education, appointment booking, and other advisory services.                    ** Verify the list of medication(s) below is accurate and up  to date. Carry this with you in case of emergency. If your medications have changed, please notify your healthcare provider.             Medication List      START taking these medications        Additional Info                      clindamycin 150 MG capsule   Commonly known as:  CLEOCIN   Quantity:  30 capsule   Refills:  0   Dose:  450 mg    Instructions:  Take 3 capsules (450 mg total) by mouth 3 (three) times daily.     Begin Date    AM    Noon    PM    Bedtime       Lactobacillus rhamnosus GG 10 billion cell capsule   Commonly known as:  CULTURELLE   Quantity:  30 capsule   Refills:  0   Dose:  1 capsule    Last time this was given:  1 capsule on 3/5/2017  9:15 AM   Instructions:  Take 1 capsule by mouth once daily.     Begin Date    AM    Noon    PM    Bedtime         CHANGE how you take these medications        Additional Info                      diazePAM 10 MG Tab   Commonly known as:  VALIUM   Quantity:  90 tablet   Refills:  5   What changed:    - how much to take  - when to take this  - additional instructions    Last time this was given:  10 mg on 3/5/2017  9:15 AM   Instructions:  One three times daily     Begin Date    AM    Noon    PM    Bedtime         CONTINUE taking these medications        Additional Info                      clonazePAM 0.5 MG tablet   Commonly known as:  KLONOPIN   Quantity:  30 tablet   Refills:  5   Dose:  0.5 mg    Last time this was given:  0.5 mg on 3/4/2017  9:30 PM   Instructions:  Take 1 tablet (0.5 mg total) by mouth every evening.     Begin Date    AM    Noon    PM    Bedtime       lamotrigine 200 MG tablet   Commonly known as:  LAMICTAL   Quantity:  60 tablet   Refills:  5   Dose:  200 mg    Last time this was given:  200 mg on 3/5/2017  9:14 AM   Instructions:  Take 1 tablet (200 mg total) by mouth 2 (two) times daily. Take one tablet twice a day.     Begin Date    AM    Noon    PM    Bedtime       phenobarbital 64.8 MG tablet   Commonly known as:  LUMINAL    Quantity:  90 tablet   Refills:  5    Instructions:  Three at bedtime     Begin Date    AM    Noon    PM    Bedtime       topiramate 200 MG Tab   Commonly known as:  TOPAMAX   Quantity:  60 tablet   Refills:  5   Dose:  200 mg    Last time this was given:  200 mg on 3/5/2017  9:15 AM   Instructions:  Take 1 tablet (200 mg total) by mouth 2 (two) times daily.     Begin Date    AM    Noon    PM    Bedtime            Where to Get Your Medications      These medications were sent to [facility]/pharmacy #[id number] - [place], [place] - [address]  [address], [place] [address]     Phone:  [phone]     clindamycin 150 MG capsule    Lactobacillus rhamnosus GG 10 billion cell capsule                  Please bring to all follow up appointments:    1. A copy of your discharge instructions.  2. All medicines you are currently taking in their original bottles.  3. Identification and insurance card.    Please arrive 15 minutes ahead of scheduled appointment time.    Please call 24 hours in advance if you must reschedule your appointment and/or time.        Follow-up Information     Follow up with Hollis Thomas MD. Schedule an appointment as soon as possible for a visit on 3/7/2017.    Specialty:  Family Medicine    Why:  hospital follow up    Contact information:    [address]  [facility]  [place] [address] [phone]          Follow up with Yvan Ortez II, MD.    Specialty:  Pediatric Neurology    Contact information:    [address] [facility] [id number] [phone]        Referrals     Future Orders    Ambulatory Referral to Physical/Occupational Therapy     Questions:    Post Surgical?:  No    Eval and Treat:  Yes    Duration:  90 days    Frequency (times per week):  Five    Precautions:  Seizures    Location:  Other (see comments) Comment - As PT sees fit    OT Location:   Comment - As PT sees fit    Restore Functional ADL Training?:  Yes    Gait Training:  No    Therapeutic Exercises:   "Resistive    Active    Passive    Wound Care:      Traction:      Electric Stimulation:  No    IONTO.:      U/S:      Developmental Stimulation?:      Specialty Programs:            Discharge Instructions       Make sure that Lamont takes 3 tablets of clindamycin every 8 hours for as long as it is prescribed.  It is important to take the whole course of antibiotics even if he is feeling better.  Follow up with Dr. Ortez as scheduled.  You may give culturelle as needed for diarrhea caused by the antibiotics.  Please call for a follow up with Dr. Thomas for Monday or Tuesday to follow up his pneumonia, seizures, diarrhea, and potential speech therapy and physical therapy.      Discharge References/Attachments     EPILEPSY (PEDIATRIC), DISCHARGE INSTRUCTIONS (ENGLISH)    ADULT, PNEUMONIA (ENGLISH)        Primary Diagnosis     Your primary diagnosis was:  Aspiration Pneumonia      Admission Information     Date & Time Provider Department CSN    2/28/2017  4:53 AM Young Monreal MD [facility] [id number]      Care Providers     Provider Role Specialty Primary office phone    Young Monreal MD Attending Provider Pediatrics [phone]    Haylee Castano MD Consulting Physician  Pediatric Infectious Disease [phone]      Your Vitals Were     BP Pulse Temp Resp Height Weight    125/88 (BP Location: Left leg, Patient Position: Lying, BP Method: Automatic) 102 98.9 °F (37.2 °C) (Axillary) 24 159.4 cm (62.75") 42.6 kg (94 lb)    SpO2 BMI             97% 16.78 kg/m2         Recent Lab Values     No lab values to display.      Pending Labs     Order Current Status    Respiratory Viral Panel by PCR Preliminary result    Respiratory Viral Panel by PCR Preliminary result      Allergies as of 3/5/2017     No Known Allergies      Advance Directives     An advance directive is a document which, in the event you are no longer able to make decisions for yourself, tells your healthcare team what kind of " treatment you do or do not want to receive, or who you would like to make those decisions for you.  If you do not currently have an advance directive, [facility] encourages you to create one.  For more information call:  [phone]), [phone] ([phone]),  or log on to [email or web address].        Language Assistance Services     ATTENTION: Language assistance services are available, free of charge. Please call [phone].      ATENCIÓN: Si habla español, tiene a carreon disposición servicios gratuitos de asistencia lingüística. Llame al [phone].     CHÚ Ý: N?u b?n nói Ti?ng Vi?t, có các d?ch v? h? tr? ngôn ng? mi?n phí dành cho b?n. G?i s? [phone].        [facility] Sign-Up     Activating your [facility] account is as easy as 1-2-3!     1) Visit [email or web address], select Sign Up Now, enter this activation code and your date of birth, then select Next.  [email or web address]  Expires: 4/19/2017  9:52 AM      2) Create a username and password to use when you visit [facility] in the future and select a security question in case you lose your password and select Next.    3) Enter your e-mail address and click Sign Up!    Additional Information  If you have questions, please e-mail [email or web address] or call [phone] to talk to our [facility] staff. Remember, [facility] is NOT to be used for urgent needs. For medical emergencies, dial 911.          [facility] complies with applicable Federal civil rights laws and does not discriminate on the basis of race, color, national origin, age, disability, or sex.

## 2017-02-28 NOTE — SIGNIFICANT EVENT
Pt intubated placed on  vent , with 8.0 ET tube settings AC , Rtae 16, Vt 360, +5 Peep and 100% Fio2 /  Pacgriselda

## 2017-02-28 NOTE — PROCEDURES
Progress Note  [facility]     PRELIM C-EEG REPORT:  Review: 2/28/17, 15:02 (start) - 16:12     Findings:  Generalized slowing with a mixture of theta (6-7Hz), delta (1-3Hz) as well as alpha/beta activity - likely related to meds.  No epileptiform discharges of electrographic seizures seen.  Patient is noted to be biting on the tube/coughing at times with associated EMG artifacts.     Full report to follow.  Will continue to monitor.     Recommend (as previously discussed): continue all home AEDs without any dose change     Findings and recs were conveyed to the MICU team.    Thank you for involving us in the care of this patient.  Rhianna Patton MD, GENE ().  Neurology-Epilepsy.

## 2017-02-28 NOTE — ASSESSMENT & PLAN NOTE
- Long history of epilepsy since childhood.   - On Clonazepam 0.5 mg QHS  - On Lamotrigine 200 mg PO BID  - Phenobarbital QHS  - Will consult Pediatric Epileptologist and neurology for their recommendation   - EEG stat based on Neuro critical ICU recommendation   - PRN Ativan for seizure

## 2017-02-28 NOTE — ED NOTES
The pt is resting. Aunt at the bedside. Attached to the cardiac monitor. Side rails x 2. Call light in reach.

## 2017-02-28 NOTE — PROVIDER PROGRESS NOTES - EMERGENCY DEPT.
Encounter Date: 2/28/2017    ED Physician Progress Notes       SCRIBE NOTE: I, Rena Knox, am scribing for, and in the presence of,  Dr. Gamble .  Physician Statement: I, Dr. Gamble , personally performed the services described in this documentation as scribed by Rena Knox in my presence, and it is both accurate and complete.     Physician Note:   Case signed out to me by Dr. Bradford. Fever with positive strep and possibly pneumonia. Multiple seizures with history of seizures. Initially accepted by pediatrics diverted to ER. Pediatrics asked him to be evaluated by adult medicine. Neuro critical care was consulted. Discussed with them they asked that medical ICU be consulted for the infectious etiology. On exam, patient appears sedated from benzos. Supple neck benign abdomen. Appears chronically ill. Pressure in the 80's. Will give more IV fluids and discussed case with neuro critical care as well as medical critical care.

## 2017-02-28 NOTE — NURSING
Pt noted to have a shaking episode resembling a seizure at 1555. MD requesting ativan 2mg IV which was given 1557. Resolved 1559. Pt noted tachycardic and hyptertensive during episode. Pt noted another seizure at 1610 and was told to increase propofol for control at this time. This episode lasted 5 minutes then resolved.

## 2017-02-28 NOTE — ED NOTES
Cooling blanket placed on patient with rectal temperature probe.  Patient incontinent of urine and stool.  Diaper and Appropriate linens changed.  Side rails up x 2.

## 2017-02-28 NOTE — ED NOTES
Critical Care aware of patient's BP of 84/54 and states she is comfortable with the BP due to the patient's size and weight.

## 2017-02-28 NOTE — ASSESSMENT & PLAN NOTE
- Had fever, and positive Strept rapid test on outside hospital  - Most likely the source is upper respiratory tract infection verus Pneumonia   - Chest X ray doesn't show parenchymal consolidation, however giving the acuity of his condition, will cover him with broad spectrum antibiotic   - Send for Blood culture, Viral Respiratory panel and urine culture  - Initial Lactate is 2.5, received two liter of NS (matched for fluid resuscitation)   - Probably his fever and sepsis that provoke his seizure.

## 2017-02-28 NOTE — H&P
[facility]  Critical Care Medicine  H&P    Patient Name: Lamont Villarreal  MRN: [id number]  Admission Date: 2/28/2017  Hospital Length of Stay: 0 days  Code Status: Full Code  Attending Provider: Staci Dominguez MD  Primary Care Provider: Hollis Thomas MD   Principal Problem: Sepsis    Subjective:     HPI:  This is Mr. Lamont Villarreal, 21 year old male known to have Epilepsy, Seizure, Autism, Developmental delay presented to outside hospital ED ([facility]) after he developed two episodes of seizure in the yesterday, his aunt brought him to outside hospital. They had some laboratory investigations for his and showed to have a positive Strept Throat and negative Flu. Brought to hospital by EMS and he had a seizure en rout to [facility] ED managed with one time IV Midazolam. He suppose to be admitted to pediatric floor, but giving his seizure he was brought to ED. He had another seizure in ED which counts him as the fourth seizure in the last 24 hours.     His history source was his aunt and some history from his aunt and briefly from his mother over the phone. He was on his usual state of health until yesterday, when he developed cough, and looking sick. He had fever and upper respiratory tract symptoms. No recent travel, no sick contact or change in his medication. Family unsure about the last time he had seizure. Of note, he is following Dr. Ortez as his pediatric neurologist, with last visit on 4/2016. We touched based with pediatric ICU staff for possible transfer patient to PICU giving his weight and history of seizure.          Review of Systems   Constitutional: Positive for activity change, appetite change and fever. Negative for chills, diaphoresis and fatigue.   HENT: Positive for congestion. Negative for dental problem and drooling.    Eyes: Negative for pain, discharge and itching.   Respiratory: Positive for cough. Negative for choking, chest tightness, shortness of breath and stridor.     Cardiovascular: Negative for chest pain.   Gastrointestinal: Negative for abdominal distention, abdominal pain, diarrhea and nausea.   Endocrine: Negative for cold intolerance and heat intolerance.   Genitourinary: Negative for dysuria and enuresis.   Musculoskeletal: Negative for gait problem, joint swelling and myalgias.   Skin: Negative for color change and pallor.   Neurological: Positive for seizures and speech difficulty. Negative for syncope and numbness.     Objective:     Vital Signs (Most Recent):  Temp: (!) 82.6 °F (28.1 °C) (02/28/17 1400)  Pulse: 97 (02/28/17 1400)  Resp: (!) 24 (02/28/17 1400)  BP: 123/80 (02/28/17 1353)  SpO2: 100 % (02/28/17 1400) Vital Signs (24h Range):  Temp:  [82.6 °F (28.1 °C)-103 °F (39.4 °C)] 82.6 °F (28.1 °C)  Pulse:  [] 97  Resp:  [20-25] 24  SpO2:  [84 %-100 %] 100 %  BP: ()/(34-80) 123/80   Weight: 42.6 kg (94 lb)  Body mass index is 16.65 kg/(m^2).    No intake or output data in the 24 hours ending 02/28/17 1413    Physical Exam   Constitutional: He appears well-developed and well-nourished.   HENT:   Head: Normocephalic and atraumatic.   Right Ear: External ear normal.   Eyes: EOM are normal. Pupils are equal, round, and reactive to light. Right eye exhibits no discharge. Left eye exhibits no discharge.   Neck: Normal range of motion. Neck supple. No JVD present. No thyromegaly present.   Cardiovascular: Normal rate and regular rhythm.  Exam reveals no gallop and no friction rub.    No murmur heard.  Pulmonary/Chest: He has wheezes. He has rales.   Tachypnea    Musculoskeletal: He exhibits no edema or deformity.   Neurological:   Look ill. Higher respiratory breathing work.    Skin: No rash noted. He is not diaphoretic. No erythema.       Vents:  Vent Mode: A/C (02/28/17 1400)  Set Rate: 16 bmp (02/28/17 1400)  Vt Set: 360 mL (02/28/17 1400)  PEEP/CPAP: 5 cmH20 (02/28/17 1400)  Oxygen Concentration (%): 100 (02/28/17 1400)  Peak Airway Pressure: 19 cmH2O  (02/28/17 1400)  Total Ve: 7.8 mL (02/28/17 1400)  F/VT Ratio<105 (RSBI): (!) 55.17 (02/28/17 1400)  Lines/Drains/Airways     Central Venous Catheter Line                 CVC Triple Lumen - PreSep Cath 02/28/17 1313 right internal jugular less than 1 day          Drain                 NG/OG Tube 02/28/17 1123 18 Fr. Center mouth less than 1 day          Airway                 Airway - Non-Surgical 02/28/17 1138 Endotracheal Tube-Hi/Lo;Endotracheal Tube less than 1 day          Peripheral Intravenous Line                 Peripheral IV - Single Lumen 02/28/17 0510 Right Forearm less than 1 day         Peripheral IV - Single Lumen 02/28/17 0553 Right Forearm less than 1 day         Peripheral IV - Single Lumen 02/28/17 1055 Left Forearm less than 1 day              Significant Labs:    CBC/Anemia Profile:    Recent Labs  Lab 02/28/17  0553 02/28/17  1052 02/28/17  1224   WBC 2.94* 7.80 10.94   HGB 6.9* 11.4* 11.1*   HCT 21.1* 34.2* 33.8*   PLT 71* 122* 128*   MCV 93 92 94   RDW 13.5 13.6 13.6        Chemistries:    Recent Labs  Lab 02/28/17  0752 02/28/17  1052 02/28/17  1224    137 138  138   K <2.0* 4.5 5.4*  5.4*   * 116* 114*  114*   CO2 11* 15* 17*  17*   BUN 8 12 11  11   CREATININE 0.4* 0.7 0.7  0.7   CALCIUM 4.0* 6.8* 6.9*  6.9*   ALBUMIN 1.6* 3.0* 3.1*  3.1*   PROT 2.8* 5.6* 5.7*  5.7*   BILITOT 0.3 0.5 0.6  0.6   ALKPHOS 49* 88 94  94   ALT 20 43 45*  45*   AST 24 65* 58*  58*   MG <0.7*  --   --      Assessment/Plan:     Neuro  Epilepsy  - Long history of epilepsy since childhood.   - On Clonazepam 0.5 mg QHS  - On Lamotrigine 200 mg PO BID  - Phenobarbital QHS  - Will consult Pediatric Epileptologist and neurology for their recommendation   - EEG stat based on Neuro critical ICU recommendation   - PRN Ativan for seizure     Seizure  - Had four epidoses of seizure in the last 24 hours, with the last one on ED.      Autism  - Long history of autism since his childhood.      Pulmonary  On mechanically assisted ventilation  Vent Mode: A/C  Oxygen Concentration (%):  [] 50  Resp Rate Total:  [18 br/min-25 br/min] 25 br/min  Vt Set:  [360 mL] 360 mL  PEEP/CPAP:  [5 cmH20] 5 cmH20  Pressure Support:  [0 cmH20] 0 cmH20  Mean Airway Pressure:  [7.5 cmH20-8.6 cmH20] 8.6 cmH20    Recent Labs  Lab 02/28/17  1435   PH 7.261*   PCO2 43.9   PO2 263*   HCO3 19.8*   POCSATURATED 100   BE -7     - Will increase RR for the time being and follow up ABG later on to further adjust his acidosis     ID  * Sepsis  - Had fever, and positive Strept rapid test on outside hospital  - Most likely the source is upper respiratory tract infection verus Pneumonia   - Chest X ray doesn't show parenchymal consolidation, however giving the acuity of his condition, will cover him with broad spectrum antibiotic   - Send for Blood culture, Viral Respiratory panel and urine culture  - Initial Lactate is 2.5, received two liter of NS (matched for fluid resuscitation)   - Probably his fever and sepsis that provoke his seizure.     Critical Care Medicine Daily Checklist:    A: Awake: RASS Goal/Actual Goal:    Actual:     B: Spontaneous Breathing Trial Performed?     C: SAT & SBT Coordinated?  No                      D: Delirium: CAM-ICU     E: Early Mobility Performed? No   F: Feeding Goal:    Status:     Current Diet Order   Procedures    Diet NPO      AS: Analgesia/Sedation Yes   T: Thromboembolic Prophylaxis No   H: HOB > 300 Yes   U: Stress Ulcer Prophylaxis (if needed) No   G: Glucose Control No   B: Bowel Function     I: Indwelling Catheter (Lines & Dillon) Necessity Yes   D: De-escalation of Antimicrobials/Pharmacotherapies No    Plan for the day/ETD No    Code Status:  Family/Goals of Care: Full Code       Critical Care Time: 60 minutes  Critical secondary to Patient has a condition that poses threat to life and bodily function: Sepsis       Critical care was time spent personally by me on the following  activities: development of treatment plan with patient or surrogate and bedside caregivers, discussions with consultants, evaluation of patient's response to treatment, examination of patient, ordering and performing treatments and interventions, ordering and review of laboratory studies, ordering and review of radiographic studies, pulse oximetry, re-evaluation of patient's condition. This critical care time did not overlap with that of any other provider or involve time for any procedures.     Stuart Edwards MD  Critical Care Medicine  [facility]

## 2017-02-28 NOTE — ED NOTES
Telephone consent received from pt's mother for placement of central line and arterial line placement; witnessed by two nurses.

## 2017-02-28 NOTE — ASSESSMENT & PLAN NOTE
Vent Mode: A/C  Oxygen Concentration (%):  [] 50  Resp Rate Total:  [18 br/min-25 br/min] 25 br/min  Vt Set:  [360 mL] 360 mL  PEEP/CPAP:  [5 cmH20] 5 cmH20  Pressure Support:  [0 cmH20] 0 cmH20  Mean Airway Pressure:  [7.5 cmH20-8.6 cmH20] 8.6 cmH20    Recent Labs  Lab 02/28/17  1435   PH 7.261*   PCO2 43.9   PO2 263*   HCO3 19.8*   POCSATURATED 100   BE -7     - Will increase RR for the time being and follow up ABG later on to further adjust his acidosis

## 2017-02-28 NOTE — ED NOTES
Pt's aunt at bedside; states pt's face appears significantly more swollen within the past hour. Magnesium and Potassium infusion stopped. Dr Edwards made aware; in to reassess pt.  States pt does appear more swollen but states to continue the Magnesium and Potassium.

## 2017-02-28 NOTE — CONSULTS
ICU Consult Note  Neurocritical Care    Admit Date: 2/28/2017  LOS: 0  Consulted by ED team:  Reason: Post SZ state with decreased responsiveness.  CC: <principal problem not specified>    Code Status: No Order     SUBJECTIVE:     Interval History/Significant Events:  The patient appears now post ictal. He opens his eyes with stimulation but he is not interactive at baseline. He is flexed in fetal position. Relative commented that he has not received his Diazepam on the last few days. CXR: L-retrocardiac opacity suggesting  A pneumonia vs atelectasis. UA OK, WBC Leukopenia; lactate is high,  CT head unremarkable.      HPI:  Mr Shravan Villarreal is a 21 y.o. male presenting to the ED from [facility]. Yesterday afternoon started having fever according to his aunt. This AM he had a seizure in route with EMS and was given 5 mg versed. Pt was suppose to be direct admit but diverted to ED for further evaluation pt arrived with a condom catheter placed. According to his relative he is on PBT 2 tablets a day. His family  states pt has seizures when he has a fever.     PMHx:   -NKDA  -Hx Scoliosis  -Hx Epilepsy since age 2.  -Hx autism.  -Progressive cognitive regression,  -Home meds:  -Lamotrigine oral 200mg 1 tablet bid  -Diazepam 10mg bid  -Topramax 200mg bid   -PBT 64.8 mg x3 at PM  Review of Symptoms:    ROS   Limited assessemnt  Medications:  Continuous Infusions:  Scheduled Meds:  PRN Meds:.    OBJECTIVE:   Vital Signs (Most Recent):   Temp: (!) 101.4 °F (38.6 °C) (02/28/17 0728)  Pulse: 104 (02/28/17 0846)  Resp: 20 (02/28/17 0731)  BP: (!) 99/52 (02/28/17 0846)  SpO2: 98 % (02/28/17 0846)    Vital Signs (24h Range):   Temp:  [101.4 °F (38.6 °C)-103 °F (39.4 °C)] 101.4 °F (38.6 °C)  Pulse:  [104-127] 104  Resp:  [20-24] 20  SpO2:  [93 %-98 %] 98 %  BP: ()/(40-56) 99/52    ICP/CPP (Last 24h):        I & O (Last 24h): No intake or output data in the 24 hours ending 02/28/17 0859  Physical Exam:  Physical Exam  General    HEENT:   Chest Heart RRR / Lungs Clear to auscultation  Abdomen: Soft nontender + BS  Extremities: OK distal pulses.  Skin:   Neurological Exam:  MS; Opens his eyes to noxious stimulation;.  CN: II-XII 2mm reactive pupils.  Motor: LUE 1/5 / RUE 1 /5 Tone normal bilaterally, mild responses to stimulation. Limited by post-ictals state and antiepileptics.  LLE 1/5 / RLE 1 /5 Tone normal bilaterally  Sensory: LT/PP/T/ Vibration Mild responses to noxious stimulation.  Complex sensory modalities: not tested  DTR: Not tested  Coordination /Fine motor: Not able to assess  Gait: Not tested.  Meningeal signs: Absent     Vent Data:   Oxygen Concentration (%):  [95] 95    Lines/Drains/Airway:          Nutrition/Tube Feeds (if NPO state why): NPO. lethargic    Labs:  ABG: No results for input(s): PH, PO2, PCO2, HCO3, POCSATURATED, BE in the last 24 hours.  BMP:No results for input(s): NA, K, CL, CO2, BUN, CREATININE, GLU, MG, PHOS in the last 24 hours.  LFT:   Lab Results   Component Value Date    ALT 14 04/25/2006     CBC:   Lab Results   Component Value Date    WBC 2.94 (L) 02/28/2017    HGB 6.9 (L) 02/28/2017    HCT 21.1 (L) 02/28/2017    MCV 93 02/28/2017    PLT 71 (L) 02/28/2017     Microbiology x 7d:   Microbiology Results (last 7 days)     Procedure Component Value Units Date/Time    Blood Culture #2 **CANNOT BE ORDERED STAT** [[id number]] Collected:  02/28/17 0554    Order Status:  Sent Specimen:  Blood from Peripheral, Antecubital, Left Updated:  02/28/17 0611    Blood Culture #1 **CANNOT BE ORDERED STAT** [[id number]] Collected:  02/28/17 0553    Order Status:  Sent Specimen:  Blood from Peripheral, Forearm, Right Updated:  02/28/17 0610        Imaging:  -CXR: L-retrocardiac opacity suggesting  A pneumonia vs atelectasis.  -CT head unremarkable.    I personally reviewed the above image.    Active Problem List:   1. SZ activity in the setting of febrile syndrome.  2. Sepsis  3. Possible Benzo withdrawal.  4. Hx  "Autism  5. Hx Scoliosis  6. LLL pneumonia        Assessment / Plan:  The patient has a febrile illness possibly a pneumonia and that combined with not receiving his Diazepan triggered the SZ events. The patient has no meningeal signs.  Neuro: Post -ictal  -Please restart his home antiepileptics  -Give Diazepam dose  -Continuous EEG monitoring  -Keep normothermic use Tylenol and cooking blankets if needed  -MRI brain with and without Gado and SZ protocol if not done prior. Not urgent  -Epilepsy consult..  -SZ precautions.  Resp:  -O2 NC to keep O2 sats >=96%  -Please get an ABG  -Dual nebs q 6hrs  -Chest PT q 6hrs  CV:  -Keep normotensive  -Consider pressors if SBP < 90 mmHg.  -Get 12 lead ECG  IVF/nutrition/Renal/GI:  -NPO   -NGT  -Give meds enterally and IV  -NS at 100cc/hr .  Hem / ID: UA is negative. CXR" L-retrocardiac opacity. Leukopenic.  -CBC + differential  -Get Pro calcitonin in serum.  -Pan culture the patient.  -Start antibiotic therapy. Consider Ceftriaxone 1gr q 24hrs  Endo:  -Accu checks q 4hrs.  -If BS > 180 mg/dl start SSI q 4hrs  -BR  Prophylaxis:  -SCDs  -Consider SC Heparin tomorrow for DVT prophylaxis.    Advance Directives and Disposition:    Full Code. Pending EEG and Epilepsy consultation prior to accepting the patient. We will admit the patient to the NICU if NCSE is present on the EEG or flurry of SZ.        Uninterrupted Critical Care/Counseling Time (directly spent today by me not including procedures 30-74 min (05891)): = 35  min    Thank you for the consultation.                David Ervin MD, MPH, FAHA,  Neurocritical Care Department Faculty  "

## 2017-03-01 ENCOUNTER — ANESTHESIA (OUTPATIENT)
Dept: INTENSIVE CARE | Facility: HOSPITAL | Age: 22
DRG: 871 | End: 2017-03-01
Payer: COMMERCIAL

## 2017-03-01 ENCOUNTER — ANESTHESIA EVENT (OUTPATIENT)
Dept: INTENSIVE CARE | Facility: HOSPITAL | Age: 22
DRG: 871 | End: 2017-03-01
Payer: COMMERCIAL

## 2017-03-01 LAB
ALBUMIN SERPL BCP-MCNC: 2.6 G/DL
ALBUMIN SERPL BCP-MCNC: 2.7 G/DL
ALBUMIN SERPL BCP-MCNC: 2.9 G/DL
ALBUMIN SERPL BCP-MCNC: 3.3 G/DL
ALLENS TEST: ABNORMAL
ALP SERPL-CCNC: 74 U/L
ALP SERPL-CCNC: 83 U/L
ALP SERPL-CCNC: 84 U/L
ALP SERPL-CCNC: 93 U/L
ALT SERPL W/O P-5'-P-CCNC: 41 U/L
ALT SERPL W/O P-5'-P-CCNC: 41 U/L
ALT SERPL W/O P-5'-P-CCNC: 43 U/L
ALT SERPL W/O P-5'-P-CCNC: 52 U/L
AMYLASE SERPL-CCNC: 32 U/L
ANION GAP SERPL CALC-SCNC: 5 MMOL/L
ANION GAP SERPL CALC-SCNC: 5 MMOL/L
ANION GAP SERPL CALC-SCNC: 6 MMOL/L
ANION GAP SERPL CALC-SCNC: 6 MMOL/L
ANISOCYTOSIS BLD QL SMEAR: SLIGHT
AST SERPL-CCNC: 56 U/L
AST SERPL-CCNC: 58 U/L
AST SERPL-CCNC: 59 U/L
AST SERPL-CCNC: 74 U/L
BACTERIA UR CULT: NO GROWTH
BASOPHILS # BLD AUTO: ABNORMAL K/UL
BASOPHILS NFR BLD: 0 %
BILIRUB SERPL-MCNC: 0.7 MG/DL
BILIRUB SERPL-MCNC: 0.7 MG/DL
BILIRUB SERPL-MCNC: 0.8 MG/DL
BILIRUB SERPL-MCNC: 1.1 MG/DL
BUN SERPL-MCNC: 8 MG/DL
CALCIUM SERPL-MCNC: 7.1 MG/DL
CALCIUM SERPL-MCNC: 7.4 MG/DL
CALCIUM SERPL-MCNC: 7.6 MG/DL
CALCIUM SERPL-MCNC: 7.7 MG/DL
CHLORIDE SERPL-SCNC: 110 MMOL/L
CHLORIDE SERPL-SCNC: 110 MMOL/L
CHLORIDE SERPL-SCNC: 111 MMOL/L
CHLORIDE SERPL-SCNC: 112 MMOL/L
CO2 SERPL-SCNC: 15 MMOL/L
CO2 SERPL-SCNC: 18 MMOL/L
CO2 SERPL-SCNC: 20 MMOL/L
CO2 SERPL-SCNC: 21 MMOL/L
CREAT SERPL-MCNC: 0.7 MG/DL
DELSYS: ABNORMAL
DIFFERENTIAL METHOD: ABNORMAL
EOSINOPHIL # BLD AUTO: ABNORMAL K/UL
EOSINOPHIL NFR BLD: 2 %
ERYTHROCYTE [DISTWIDTH] IN BLOOD BY AUTOMATED COUNT: 13.6 %
ERYTHROCYTE [SEDIMENTATION RATE] IN BLOOD BY WESTERGREN METHOD: 24 MM/H
EST. GFR  (AFRICAN AMERICAN): >60 ML/MIN/1.73 M^2
EST. GFR  (NON AFRICAN AMERICAN): >60 ML/MIN/1.73 M^2
ETCO2: 0
FIO2: 40
GLUCOSE SERPL-MCNC: 102 MG/DL
GLUCOSE SERPL-MCNC: 107 MG/DL
GLUCOSE SERPL-MCNC: 76 MG/DL
GLUCOSE SERPL-MCNC: 83 MG/DL
HCO3 UR-SCNC: 19.4 MMOL/L (ref 24–28)
HCT VFR BLD AUTO: 32.3 %
HGB BLD-MCNC: 10.9 G/DL
LACTATE SERPL-SCNC: 1 MMOL/L
LIPASE SERPL-CCNC: 11 U/L
LYMPHOCYTES # BLD AUTO: ABNORMAL K/UL
LYMPHOCYTES NFR BLD: 11 %
MAGNESIUM SERPL-MCNC: 1.7 MG/DL
MAGNESIUM SERPL-MCNC: 1.8 MG/DL
MCH RBC QN AUTO: 30.5 PG
MCHC RBC AUTO-ENTMCNC: 33.7 %
MCV RBC AUTO: 91 FL
METAMYELOCYTES NFR BLD MANUAL: 5 %
MIN VOL: 8.96
MODE: ABNORMAL
MONOCYTES # BLD AUTO: ABNORMAL K/UL
MONOCYTES NFR BLD: 2 %
NEUTROPHILS NFR BLD: 70 %
NEUTS BAND NFR BLD MANUAL: 10 %
PCO2 BLDA: 28.9 MMHG (ref 35–45)
PEEP: 5
PH SMN: 7.43 [PH] (ref 7.35–7.45)
PHENOBARB SERPL-MCNC: 33.6 UG/DL
PIP: 25
PLATELET # BLD AUTO: 130 K/UL
PLATELET BLD QL SMEAR: ABNORMAL
PMV BLD AUTO: 10.3 FL
PO2 BLDA: 104 MMHG (ref 80–100)
POC BE: -5 MMOL/L
POC SATURATED O2: 98 % (ref 95–100)
POC TCO2: 20 MMOL/L (ref 23–27)
POCT GLUCOSE: 101 MG/DL (ref 70–110)
POCT GLUCOSE: 86 MG/DL (ref 70–110)
POCT GLUCOSE: 88 MG/DL (ref 70–110)
POTASSIUM SERPL-SCNC: 3.5 MMOL/L
POTASSIUM SERPL-SCNC: 3.7 MMOL/L
POTASSIUM SERPL-SCNC: 3.8 MMOL/L
POTASSIUM SERPL-SCNC: 5 MMOL/L
PROT SERPL-MCNC: 5.2 G/DL
PROT SERPL-MCNC: 5.3 G/DL
PROT SERPL-MCNC: 5.3 G/DL
PROT SERPL-MCNC: 5.6 G/DL
PROVIDER CREDENTIALS: ABNORMAL
PROVIDER NOTIFIED: ABNORMAL
RBC # BLD AUTO: 3.57 M/UL
SAMPLE: ABNORMAL
SITE: ABNORMAL
SODIUM SERPL-SCNC: 133 MMOL/L
SODIUM SERPL-SCNC: 135 MMOL/L
SODIUM SERPL-SCNC: 135 MMOL/L
SODIUM SERPL-SCNC: 136 MMOL/L
SP02: 100
TIME NOTIFIED: 756
VERBAL RESULT READBACK PERFORMED: YES
VT: 360
WBC # BLD AUTO: 13.31 K/UL

## 2017-03-01 PROCEDURE — 95957 EEG DIGITAL ANALYSIS: CPT

## 2017-03-01 PROCEDURE — 36556 INSERT NON-TUNNEL CV CATH: CPT

## 2017-03-01 PROCEDURE — 85007 BL SMEAR W/DIFF WBC COUNT: CPT

## 2017-03-01 PROCEDURE — 80053 COMPREHEN METABOLIC PANEL: CPT | Mod: 91

## 2017-03-01 PROCEDURE — 25000003 PHARM REV CODE 250: Performed by: ANESTHESIOLOGY

## 2017-03-01 PROCEDURE — 83690 ASSAY OF LIPASE: CPT

## 2017-03-01 PROCEDURE — 82150 ASSAY OF AMYLASE: CPT

## 2017-03-01 PROCEDURE — 25000003 PHARM REV CODE 250: Performed by: STUDENT IN AN ORGANIZED HEALTH CARE EDUCATION/TRAINING PROGRAM

## 2017-03-01 PROCEDURE — 63600175 PHARM REV CODE 636 W HCPCS: Performed by: ANESTHESIOLOGY

## 2017-03-01 PROCEDURE — 25000003 PHARM REV CODE 250: Performed by: INTERNAL MEDICINE

## 2017-03-01 PROCEDURE — 63600175 PHARM REV CODE 636 W HCPCS

## 2017-03-01 PROCEDURE — 83735 ASSAY OF MAGNESIUM: CPT

## 2017-03-01 PROCEDURE — 95951 HC EEG MONITORING/VIDEO RECORD: CPT

## 2017-03-01 PROCEDURE — 99900035 HC TECH TIME PER 15 MIN (STAT)

## 2017-03-01 PROCEDURE — 27000221 HC OXYGEN, UP TO 24 HOURS

## 2017-03-01 PROCEDURE — 36600 WITHDRAWAL OF ARTERIAL BLOOD: CPT

## 2017-03-01 PROCEDURE — P9045 ALBUMIN (HUMAN), 5%, 250 ML: HCPCS | Performed by: ANESTHESIOLOGY

## 2017-03-01 PROCEDURE — 95951 PR EEG MONITORING/VIDEORECORD: CPT | Mod: 26,,, | Performed by: PSYCHIATRY & NEUROLOGY

## 2017-03-01 PROCEDURE — 20000000 HC ICU ROOM

## 2017-03-01 PROCEDURE — P9047 ALBUMIN (HUMAN), 25%, 50ML: HCPCS | Performed by: ANESTHESIOLOGY

## 2017-03-01 PROCEDURE — 99291 CRITICAL CARE FIRST HOUR: CPT | Mod: ,,, | Performed by: ANESTHESIOLOGY

## 2017-03-01 PROCEDURE — 93005 ELECTROCARDIOGRAM TRACING: CPT

## 2017-03-01 PROCEDURE — 83605 ASSAY OF LACTIC ACID: CPT

## 2017-03-01 PROCEDURE — 27200966 HC CLOSED SUCTION SYSTEM

## 2017-03-01 PROCEDURE — 25000003 PHARM REV CODE 250

## 2017-03-01 PROCEDURE — 82803 BLOOD GASES ANY COMBINATION: CPT

## 2017-03-01 PROCEDURE — 63600175 PHARM REV CODE 636 W HCPCS: Performed by: STUDENT IN AN ORGANIZED HEALTH CARE EDUCATION/TRAINING PROGRAM

## 2017-03-01 PROCEDURE — 99900026 HC AIRWAY MAINTENANCE (STAT)

## 2017-03-01 PROCEDURE — 80202 ASSAY OF VANCOMYCIN: CPT

## 2017-03-01 PROCEDURE — 80053 COMPREHEN METABOLIC PANEL: CPT

## 2017-03-01 PROCEDURE — C1751 CATH, INF, PER/CENT/MIDLINE: HCPCS

## 2017-03-01 PROCEDURE — 31500 INSERT EMERGENCY AIRWAY: CPT | Mod: ,,, | Performed by: ANESTHESIOLOGY

## 2017-03-01 PROCEDURE — 80184 ASSAY OF PHENOBARBITAL: CPT

## 2017-03-01 PROCEDURE — 85027 COMPLETE CBC AUTOMATED: CPT

## 2017-03-01 PROCEDURE — 25000003 PHARM REV CODE 250: Performed by: PSYCHIATRY & NEUROLOGY

## 2017-03-01 RX ORDER — PROPOFOL 10 MG/ML
INJECTION, EMULSION INTRAVENOUS
Status: COMPLETED
Start: 2017-03-01 | End: 2017-03-01

## 2017-03-01 RX ORDER — ROCURONIUM BROMIDE 10 MG/ML
INJECTION, SOLUTION INTRAVENOUS
Status: COMPLETED
Start: 2017-03-01 | End: 2017-03-01

## 2017-03-01 RX ORDER — SUCCINYLCHOLINE CHLORIDE 20 MG/ML
INJECTION INTRAMUSCULAR; INTRAVENOUS
Status: DISCONTINUED
Start: 2017-03-01 | End: 2017-03-01 | Stop reason: WASHOUT

## 2017-03-01 RX ORDER — ALBUMIN HUMAN 250 G/1000ML
25 SOLUTION INTRAVENOUS ONCE
Status: COMPLETED | OUTPATIENT
Start: 2017-03-01 | End: 2017-03-01

## 2017-03-01 RX ORDER — FLUCONAZOLE 2 MG/ML
100 INJECTION, SOLUTION INTRAVENOUS
Status: DISCONTINUED | OUTPATIENT
Start: 2017-03-01 | End: 2017-03-02

## 2017-03-01 RX ORDER — PHENOBARBITAL 20 MG/5ML
194.4 ELIXIR ORAL NIGHTLY
Status: DISCONTINUED | OUTPATIENT
Start: 2017-03-01 | End: 2017-03-05 | Stop reason: HOSPADM

## 2017-03-01 RX ORDER — SODIUM CHLORIDE 9 MG/ML
INJECTION, SOLUTION INTRAVENOUS CONTINUOUS
Status: DISCONTINUED | OUTPATIENT
Start: 2017-03-01 | End: 2017-03-02

## 2017-03-01 RX ORDER — ETOMIDATE 2 MG/ML
INJECTION INTRAVENOUS
Status: DISCONTINUED
Start: 2017-03-01 | End: 2017-03-01 | Stop reason: WASHOUT

## 2017-03-01 RX ORDER — ALBUMIN HUMAN 50 G/1000ML
25 SOLUTION INTRAVENOUS ONCE
Status: COMPLETED | OUTPATIENT
Start: 2017-03-01 | End: 2017-03-01

## 2017-03-01 RX ADMIN — VANCOMYCIN HYDROCHLORIDE 750 MG: 1 INJECTION, POWDER, LYOPHILIZED, FOR SOLUTION INTRAVENOUS at 11:03

## 2017-03-01 RX ADMIN — ENOXAPARIN SODIUM 40 MG: 100 INJECTION SUBCUTANEOUS at 11:03

## 2017-03-01 RX ADMIN — TOPIRAMATE 200 MG: 200 TABLET, FILM COATED ORAL at 08:03

## 2017-03-01 RX ADMIN — CLONAZEPAM 0.5 MG: 0.5 TABLET ORAL at 08:03

## 2017-03-01 RX ADMIN — ACETAMINOPHEN 650 MG: 325 TABLET, FILM COATED ORAL at 08:03

## 2017-03-01 RX ADMIN — FAMOTIDINE 20 MG: 20 TABLET, FILM COATED ORAL at 08:03

## 2017-03-01 RX ADMIN — SODIUM CHLORIDE: 0.9 INJECTION, SOLUTION INTRAVENOUS at 09:03

## 2017-03-01 RX ADMIN — Medication 3 ML: at 01:03

## 2017-03-01 RX ADMIN — LAMOTRIGINE 200 MG: 100 TABLET ORAL at 08:03

## 2017-03-01 RX ADMIN — ALBUMIN (HUMAN) 25 G: 12.5 SOLUTION INTRAVENOUS at 09:03

## 2017-03-01 RX ADMIN — DIAZEPAM 10 MG: 5 TABLET ORAL at 08:03

## 2017-03-01 RX ADMIN — ALBUMIN (HUMAN) 25 G: 12.5 SOLUTION INTRAVENOUS at 03:03

## 2017-03-01 RX ADMIN — FLUCONAZOLE IN SODIUM CHLORIDE 100 MG: 2 INJECTION, SOLUTION INTRAVENOUS at 01:03

## 2017-03-01 RX ADMIN — CHLORHEXIDINE GLUCONATE 15 ML: 1.2 RINSE ORAL at 08:03

## 2017-03-01 RX ADMIN — PHENOBARBITAL 194.4 MG: 20 ELIXIR ORAL at 08:03

## 2017-03-01 RX ADMIN — SODIUM CHLORIDE: 0.9 INJECTION, SOLUTION INTRAVENOUS at 06:03

## 2017-03-01 RX ADMIN — Medication 3 ML: at 10:03

## 2017-03-01 RX ADMIN — ROCURONIUM BROMIDE 50 MG: 10 INJECTION, SOLUTION INTRAVENOUS at 03:03

## 2017-03-01 RX ADMIN — PIPERACILLIN SODIUM AND TAZOBACTAM SODIUM 4.5 G: 4; .5 INJECTION, POWDER, LYOPHILIZED, FOR SOLUTION INTRAVENOUS at 06:03

## 2017-03-01 RX ADMIN — PIPERACILLIN SODIUM AND TAZOBACTAM SODIUM 4.5 G: 4; .5 INJECTION, POWDER, LYOPHILIZED, FOR SOLUTION INTRAVENOUS at 02:03

## 2017-03-01 RX ADMIN — PROPOFOL 200 MG: 10 INJECTION, EMULSION INTRAVENOUS at 03:03

## 2017-03-01 RX ADMIN — PIPERACILLIN SODIUM AND TAZOBACTAM SODIUM 4.5 G: 4; .5 INJECTION, POWDER, LYOPHILIZED, FOR SOLUTION INTRAVENOUS at 11:03

## 2017-03-01 NOTE — ASSESSMENT & PLAN NOTE
- Had four epidoses of seizure in the last 24 hours, with the last one on ED.  - One episode of seizure like activity clinically but accordingly to the EEG monitor, it was not a seizure.  - Will continue his home medication through OG tube.

## 2017-03-01 NOTE — PROGRESS NOTES
[facility]  Critical Care Medicine  Progress Note    Patient Name: Lamont Villarreal  MRN: [id number]  Admission Date: 2/28/2017  Hospital Length of Stay: 1 days  Code Status: Full Code  Attending Provider: Staci Dominguez MD  Primary Care Provider: Hollis Thomas MD   Principal Problem: Sepsis    Subjective:     HPI:  This is Mr. Lamont Villarreal, 21 year old male known to have Epilepsy, Seizure, Autism, Developmental delay presented to outside hospital ED ([facility]) after he developed two episodes of seizure in the yesterday, his aunt brought him to outside hospital. They had some laboratory investigations for his and showed to have a positive Strept Throat and negative Flu. Brought to hospital by EMS and he had a seizure en rout to [facility] ED managed with one time IV Midazolam. He suppose to be admitted to pediatric floor, but giving his seizure he was brought to ED. He had another seizure in ED which counts him as the fourth seizure in the last 24 hours.     His history source was his aunt and some history from his aunt and briefly from his mother over the phone. He was on his usual state of health until yesterday, when he developed cough, and looking sick. He had fever and upper respiratory tract symptoms. No recent travel, no sick contact or change in his medication. Family unsure about the last time he had seizure. Of note, he is following Dr. Ortez as his pediatric neurologist, with last visit on 4/2016. We touched based with pediatric ICU staff for possible transfer patient to PICU giving his weight and history of seizure.       Hospital/ICU Course:  - Intubated at presentation 2/28  - 3/1 patient pulled out ETT tube and re intubated   - 3/1 patient accepted to be transferred to Neuro ICU     Interval History/Significant Events:     Over night, and upon decrease his Propofol titration, patient was semi awake and pulled out his ETT. It was urgently re intubated and confirm placement with CXR.  ABG from yesterday showed improved in his pH, will repeat ABG this morning and make the adjustment through ventilator.     Review of Systems   Unable to perform ROS: Intubated     Objective:     Vital Signs (Most Recent):  Temp: 100.3 °F (37.9 °C) (03/01/17 0400)  Pulse: 101 (03/01/17 0600)  Resp: (!) 24 (03/01/17 0600)  BP: 104/64 (03/01/17 0600)  SpO2: 100 % (03/01/17 0600) Vital Signs (24h Range):  Temp:  [96.6 °F (35.9 °C)-101.8 °F (38.8 °C)] 100.3 °F (37.9 °C)  Pulse:  [] 101  Resp:  [17-25] 24  SpO2:  [84 %-100 %] 100 %  BP: ()/(34-82) 104/64   Weight: 42.6 kg (94 lb)  Body mass index is 16.78 kg/(m^2).      Intake/Output Summary (Last 24 hours) at 03/01/17 0729  Last data filed at 03/01/17 0600   Gross per 24 hour   Intake          2339.48 ml   Output             1725 ml   Net           614.48 ml       Physical Exam   Constitutional: He appears well-developed and well-nourished.   HENT:   Head: Normocephalic and atraumatic.   Right Ear: External ear normal.   Eyes: EOM are normal. Pupils are equal, round, and reactive to light. Right eye exhibits no discharge. Left eye exhibits no discharge.   Neck: Normal range of motion. Neck supple. No JVD present. No thyromegaly present.   Cardiovascular: Normal rate and regular rhythm.  Exam reveals no gallop and no friction rub.    No murmur heard.  Pulmonary/Chest: He has wheezes. He has rales.   Tachypnea    Musculoskeletal: He exhibits no edema or deformity.   Neurological:   Look ill. Higher respiratory breathing work.    Skin: No rash noted. He is not diaphoretic. No erythema.       Vents:  Vent Mode: A/C (03/01/17 0531)  Set Rate: 24 bmp (03/01/17 0531)  Vt Set: 360 mL (03/01/17 0531)  Pressure Support: 0 cmH20 (03/01/17 0531)  PEEP/CPAP: 5 cmH20 (03/01/17 0531)  Oxygen Concentration (%): 40 (03/01/17 0600)  Peak Airway Pressure: 24 cmH2O (03/01/17 0531)  Total Ve: 9.24 mL (03/01/17 0531)  F/VT Ratio<105 (RSBI): (!) (P) 51.72 (03/01/17  0531)  Lines/Drains/Airways     Central Venous Catheter Line                 CVC Triple Lumen - PreSep Cath 02/28/17 1313 right internal jugular less than 1 day          Drain                 Urethral Catheter 02/28/17 1 day         NG/OG Tube 02/28/17 1123 18 Fr. Center mouth less than 1 day          Airway                 Airway - Non-Surgical 02/28/17 1138 Endotracheal Tube-Hi/Lo;Endotracheal Tube less than 1 day          Peripheral Intravenous Line                 Peripheral IV - Single Lumen 02/28/17 0510 Right Forearm 1 day         Peripheral IV - Single Lumen 02/28/17 0553 Right Forearm 1 day         Peripheral IV - Single Lumen 02/28/17 1055 Left Forearm less than 1 day              Significant Labs:    CBC/Anemia Profile:    Recent Labs  Lab 02/28/17  1052 02/28/17  1224 03/01/17  0433   WBC 7.80 10.94 13.31*   HGB 11.4* 11.1* 10.9*   HCT 34.2* 33.8* 32.3*   * 128* 130*   MCV 92 94 91   RDW 13.6 13.6 13.6        Chemistries:    Recent Labs  Lab 02/28/17  0752  02/28/17  1224 02/28/17  1549 02/28/17  2049 03/01/17  0047 03/01/17  0433     < > 138  138 137 137 133* 135*   K <2.0*  < > 5.4*  5.4* 4.1 3.7 5.0 3.7   *  < > 114*  114* 111* 113* 112* 111*   CO2 11*  < > 17*  17* 19* 19* 15* 18*   BUN 8  < > 11  11 9 7 8 8   CREATININE 0.4*  < > 0.7  0.7 0.7 0.6 0.7 0.7   CALCIUM 4.0*  < > 6.9*  6.9* 7.2* 7.1* 7.1* 7.4*   ALBUMIN 1.6*  < > 3.1*  3.1* 3.1* 2.8* 2.6* 2.7*   PROT 2.8*  < > 5.7*  5.7* 5.7* 5.1* 5.3* 5.2*   BILITOT 0.3  < > 0.6  0.6 0.6 0.8 0.7 1.1*   ALKPHOS 49*  < > 94  94 86 78 74 93   ALT 20  < > 45*  45* 43 41 41 52*   AST 24  < > 58*  58* 49* 52* 56* 74*   MG <0.7*  --  1.9 2.1  --   --   --    < > = values in this interval not displayed.    Assessment/Plan:     Neuro  Epilepsy  - Long history of epilepsy since childhood.   - On Clonazepam 0.5 mg QHS  - On Lamotrigine 200 mg PO BID  - Topamax 200 mg BID  - Phenobarbital QHS  - Prelim result from EEG monitor showed  no active seizure and some artifact from medication   - Had seizure like activity yesterday and was given one time Ativan 2 mg IV.     Seizure  - Had four epidoses of seizure in the last 24 hours, with the last one on ED.  - One episode of seizure like activity clinically but accordingly to the EEG monitor, it was not a seizure.  - Will continue his home medication through OG tube.     Autism  - Long history of autism since his childhood.   - Having such diagnosis, it might be difficult to perform SBT trial since it needs cooperation from patient's side.  - Mother at bedside, hopefully will ease the process.     Pulmonary  On mechanically assisted ventilation  Vent Mode: A/C  Oxygen Concentration (%):  [] 40  Resp Rate Total:  [18 br/min-41 br/min] 24 br/min  Vt Set:  [360 mL] 360 mL  PEEP/CPAP:  [5 cmH20] 5 cmH20  Pressure Support:  [0 cmH20] 0 cmH20  Mean Airway Pressure:  [7.5 faZ61-95 cmH20] 10 cmH20    Recent Labs  Lab 02/28/17  1800   PH 7.415   PCO2 29.4*   PO2 152*   HCO3 18.8*   POCSATURATED 99   BE -6   - Improvement of his ABG after adjustment of his RR, will do another ABG this morning and make further adjustment     ID  * Sepsis  - Had fever, and positive Strept rapid test on outside hospital  - Most likely the source is upper respiratory tract infection verus Pneumonia   - Chest X ray doesn't show parenchymal consolidation, however giving the acuity of his condition, will cover him with broad spectrum antibiotic   - Send for Blood culture, Viral Respiratory panel and urine culture. BCx showed no growth to date. Resp Sputum sample showed no organism   - On Vanc and Zosyn for the time being, will consider de escalating upon his objective measure improvement   Recent Labs  Lab 02/28/17  1052 02/28/17  1224 03/01/17  0433   WBC 7.80 10.94 13.31*          Critical Care Medicine Daily Checklist:    A: Awake: RASS Goal/Actual Goal: RASS Goal: 0-->alert and calm  Actual: Pandya Agitation Sedation Scale  (RASS): Light sedation   B: Spontaneous Breathing Trial Performed?     C: SAT & SBT Coordinated?  No                      D: Delirium: CAM-ICU Overall CAM-ICU: Negative   E: Early Mobility Performed? No   F: Feeding Goal:    Status:     Current Diet Order   Procedures    Diet NPO      AS: Analgesia/Sedation Yes   T: Thromboembolic Prophylaxis Yes   H: HOB > 300 Yes   U: Stress Ulcer Prophylaxis (if needed) Yes   G: Glucose Control Yes   B: Bowel Function Stool Occurrence: 0   I: Indwelling Catheter (Lines & Dillon) Necessity No   D: De-escalation of Antimicrobials/Pharmacotherapies No    Plan for the day/ETD No    Code Status:  Family/Goals of Care: Full Code       Critical Care Time: 60 minutes  Critical secondary to Patient has a condition that poses threat to life and bodily function: febrile seizure       Critical care was time spent personally by me on the following activities: development of treatment plan with patient or surrogate and bedside caregivers, discussions with consultants, evaluation of patient's response to treatment, examination of patient, ordering and performing treatments and interventions, ordering and review of laboratory studies, ordering and review of radiographic studies, pulse oximetry, re-evaluation of patient's condition. This critical care time did not overlap with that of any other provider or involve time for any procedures.     Stuart Edwards MD  Critical Care Medicine  [facility]

## 2017-03-01 NOTE — ANESTHESIA PROCEDURE NOTES
Intubation    Diagnosis: Seizure  Patient location during procedure: ICU  Procedure start time: 3/1/2017 3:30 AM  Timeout: 3/1/2017 3:30 AM  Procedure end time: 3/1/2017 3:37 AM  Staffing  Anesthesiologist: LUÍS HERRERA  Resident/CRNA: DAVI QUINTANILLA  Other anesthesia staff: EV TEMPLETON  Performed by: resident/CRNA and other anesthesia staff   Anesthesiologist was present at the time of the procedure.  Preanesthetic Checklist  Completed: patient identified, site marked, pre-op evaluation, timeout performed, IV checked, risks and benefits discussed, monitors and equipment checked and anesthesia consent given  Intubation  Indication: airway protection, respiratory failure  Pre-oxygenation. Induction: intravenous, mask ventilation: easy mask.  Intubation: postinduction, laryngoscopy video-assisted, Glidescope.  Endotracheal Tube: 7.0 mm ID, cuffed (inflated to minimal occlusive pressure)  Attempts: 1, Grade I - full view of cords  Complicating Factors: small mouth  Tube secured at 25 cm at the lips.  Findings post-intubation: bilateral breath sounds, positive ETCO2, atraumatic / condition of teeth unchanged  Position Confirmation: auscultation  Additional Notes  Called for possible self extubation, pt induced with 100 mg of propofol,50 mg of bessy, glidescope used for inspection, ETT was in oropharynx, BMV, pt intubated with glidescope without difficulty, tolerated procedure well.

## 2017-03-01 NOTE — PLAN OF CARE
Problem: Patient Care Overview  Goal: Plan of Care Review  Outcome: Ongoing (interventions implemented as appropriate)  Pt admitted from ED presented with fever and seizures. Diagnosed  with pneumonia and strep throat. Pt intubated in ED. TLC also placed there. Pt with 3 PIV, OGT, and geronimo. Pt noted siezure like activity. Continuous EEG on now. Pt sedated on propofol and fentanyl. Pt aunt was at bedside. Reported that the pt mom would be up today. Plans to transfer to neuro ICU when a bed is available. Will con't to monitor.  Goal: Individualization & Mutuality  Outcome: Ongoing (interventions implemented as appropriate)  Past Medical History:   Diagnosis Date    Developmental delay      Mental disorder      Seizures       History reviewed. No pertinent surgical history.

## 2017-03-01 NOTE — PROGRESS NOTES
Progress Note  Neuro Critical Care    Admit Date: 2/28/2017   Length of Stay:  LOS: 1 day     SUBJECTIVE:   Follow-up For: Sepsis    Interval History:  Concerns overnight that patient was pulling ET tube overnight after the propofol was decreased and pt was urgently re-intubated     Brief HPI:      Review of Systems:  Review of systems not obtained due to patient factors intubated, sedated.    OBJECTIVE:     Vital Signs (Most Recent):  Temp: (!) 103.5 °F (39.7 °C) (03/01/17 0900)  Pulse: 105 (03/01/17 0930)  Resp: (!) 22 (03/01/17 0930)  BP: (!) 109/53 (03/01/17 0930)  SpO2: 96 % (03/01/17 0930) Vital Signs Range (Last 24H):  Temp:  [96.6 °F (35.9 °C)-103.5 °F (39.7 °C)]   Pulse:  []   Resp:  [16-27]   BP: ()/(46-85)   SpO2:  [84 %-100 %]      Body mass index is 16.78 kg/(m^2).     I & O (Last 24H):    Intake/Output Summary (Last 24 hours) at 03/01/17 0946  Last data filed at 03/01/17 0924   Gross per 24 hour   Intake          2554.96 ml   Output             2360 ml   Net           194.96 ml       Ventilator:  Vent Mode: A/C  Oxygen Concentration (%):  [] 40  Resp Rate Total:  [18 br/min-41 br/min] 18 br/min  Vt Set:  [360 mL] 360 mL  PEEP/CPAP:  [5 cmH20] 5 cmH20  Pressure Support:  [0 cmH20] 0 cmH20  Mean Airway Pressure:  [7.5 dqQ21-32 cmH20] 10 cmH20          Physical Exam:  MS; Opens his eyes to noxious stimulation;.  CN: II-XII 2mm reactive pupils.  Motor: LUE 1/5 / RUE 1 /5 Tone normal bilaterally, mild responses to stimulation. Limited by post-ictals state and antiepileptics.  LLE 1/5 / RLE 1 /5 Tone normal bilaterally  Sensory: LT/PP/T/ Vibration Mild responses to noxious stimulation.  Complex sensory modalities: not tested  DTR: Not tested  Coordination /Fine motor: Not able to assess  Gait: Not tested.  Meningeal signs: Absent    Laboratory:    Recent Labs  Lab 02/28/17  1052 02/28/17  1224 03/01/17  0433   WBC 7.80 10.94 13.31*   HGB 11.4* 11.1* 10.9*   HCT 34.2* 33.8* 32.3*   *  128* 130*         Recent Labs  Lab 02/28/17  2049 03/01/17  0047 03/01/17  0433    133* 135*   K 3.7 5.0 3.7   * 112* 111*   CO2 19* 15* 18*   BUN 7 8 8   CREATININE 0.6 0.7 0.7   CALCIUM 7.1* 7.1* 7.4*   PROT 5.1* 5.3* 5.2*   BILITOT 0.8 0.7 1.1*   ALKPHOS 78 74 93   ALT 41 41 52*   AST 52* 56* 74*         Recent Labs  Lab 02/28/17  0752 02/28/17  1224 02/28/17  1549   MG <0.7* 1.9 2.1         Recent Labs  Lab 02/28/17  1224   APTT 27.5   INR 1.2       Microbiology Results (last 7 days)     Procedure Component Value Units Date/Time    Blood Culture #1 **CANNOT BE ORDERED STAT** [[id number]] Collected:  02/28/17 0553    Order Status:  Completed Specimen:  Blood from Peripheral, Forearm, Right Updated:  03/01/17 0812     Blood Culture, Routine No Growth to date     Blood Culture, Routine No Growth to date    Blood Culture #2 **CANNOT BE ORDERED STAT** [[id number]] Collected:  02/28/17 0554    Order Status:  Completed Specimen:  Blood from Peripheral, Antecubital, Left Updated:  03/01/17 0812     Blood Culture, Routine No Growth to date     Blood Culture, Routine No Growth to date    Culture, Respiratory [[id number]] Collected:  02/28/17 1750    Order Status:  Completed Specimen:  Respiratory from Sputum Updated:  02/28/17 2024     Gram Stain (Respiratory) <10 epithelial cells per low power field.     Gram Stain (Respiratory) Few WBC's     Gram Stain (Respiratory) No organisms seen    Respiratory Viral Panel by PCR [[id number]] Collected:  02/28/17 1909    Order Status:  No result Updated:  02/28/17 1910    Respiratory virus antigens panel [[id number]] Collected:  02/28/17 1549    Order Status:  Canceled Specimen:  Respiratory from Nasopharyngeal Swab Updated:  02/28/17 1823    Narrative:       Respiratory Viruses - DFA was cancelled on 02/28/2017 at 19:09 by   [email or web address]; Test changed to sendout.  02/28/2017  19:09    Respiratory Viral Panel by PCR [[id number]] Collected:  02/28/17 1241    Order Status:  No result  Updated:  02/28/17 1241    Respiratory virus antigens panel [[id number]] Collected:  02/28/17 1057    Order Status:  Canceled Specimen:  Respiratory from Nasopharyngeal Swab Updated:  02/28/17 1107    Narrative:       Respiratory Viruses - DFA was cancelled on 02/28/2017 at 12:41 by   MPE; Test changed to sendout    Urine culture [[id number]] Collected:  02/28/17 0554    Order Status:  No result Specimen:  Urine from Catheterized Updated:  02/28/17 1102    Narrative:       Urine culture added per Dr. Bradford, order ID [id number] 02/28/17 11:01    Urine culture [[id number]]     Order Status:  Completed Specimen:  Urine from Urine, Catheterized     Urine culture [[id number]]     Order Status:  Canceled Specimen:  Urine           ASSESSMENT/PLAN:   Hospital Problems:  Active Hospital Problems    Diagnosis  POA    *Sepsis [A41.9]  Yes    On mechanically assisted ventilation [Z99.11]  Not Applicable    Seizure [R56.9]  Yes    Autism [F84.0]  Yes    Developmental delay [R62.50]  Yes    Epilepsy [G40.909]  Yes      Resolved Hospital Problems    Diagnosis Date Resolved POA   No resolved problems to display.       Primary Diagnosis:  Sepsis    Plan:  Neuro:   Autism, history of seizure disorder  - seizure d/o previously controlled on topamax 200mg BID, lamictal 200mg BUD, phenobarbital 64.8mg (3 at bedtime), diazepam 10 TID, klonopin 0.5mg qpm (per outpatient)    Recurrent seizures  - EEG (2/28): Generalized slowing with a mixture of theta (6-7Hz), delta (1-3Hz) as well as alpha/beta activity - likely related to meds.  No epileptiform discharges of electrographic seizures seen.  - klonopin 0.5mg qd  - topamax 200 BID  - diazepam 10mg BID  - lamictal 200mg BID  - phenobarb 64.8mg qpm    - fentanyl   - last got propofol @ 3am X one time dose (200mg)    Pulmonary:   Ventilator:  Vent Mode: A/C  Oxygen Concentration (%):  [] 40  Resp Rate Total:  [18 br/min-41 br/min] 18 br/min  Vt Set:  [360 mL] 360 mL  PEEP/CPAP:  [5  cmH20] 5 cmH20  Pressure Support:  [0 cmH20] 0 cmH20  Mean Airway Pressure:  [7.5 qqX90-63 cmH20] 10 cmH20     Daily CXR (3/1/2017): ET tube tip 2 cm above the anita. RIGHT IJ catheter tip overlies the SVC.   ABG (3/1/2017): 7.435/28.9/104    Cardiac:   No active issues    Renal:   I & O (Last 24H):    Intake/Output Summary (Last 24 hours) at 03/01/17 0946  Last data filed at 03/01/17 0924   Gross per 24 hour   Intake          2554.96 ml   Output             2360 ml   Net           194.96 ml       Infectious Disease:   - febrile episodes this morning - 103.5F  - leukocytosis 13.31 this morning; initial labs on admit remarkable for leukopenia (unsure if lab results are an error or if leukopenia was side effect of AED's); last labs from 2014  - vanc, zosyn  - RCx (2/28): no orgs  - resp viral panel pending  - BCx (2/28): NGTD  - Ucx (2/28): pending; however UA unremarkable    Hematology/Oncology:  H/H: 10.9/32.3  Platelets: [id number]  PT/INR: 1.2     Endocrine:   POCT glucose   SSI    Fluids/Electrolytes/Nutrition/GI:   IVF @50cc/hr    Prophylaxis: lovenox    Lines:  CVC triple lumen RIJ 2/28  Peripheral IV z3  Dillon 2/28  NG/OG 2/28  ET tube 2/28    Brando Hoyos  PGY 2

## 2017-03-01 NOTE — PROCEDURES
ICU EEG/VIDEO MONITORING REPORT    Lamont Villarreal  [id number]  1995    DATE OF SERVICE: 2/28/17 - 3/1/17    DATE OF ADMISSION: 2/28/2017  4:53 AM    ADMITTING PROVIDER: Tramaine Gamble MD    REASON FOR CONSULT: 20yo M with hx of epilepsy since infancy, admitted after several witnessed seizures related to LTRI.    MEDICATIONS:   Current Facility-Administered Medications   Medication    0.9%  NaCl infusion    acetaminophen tablet 650 mg    chlorhexidine 0.12 % solution 15 mL    clonazePAM tablet 0.5 mg    diazePAM tablet 10 mg    enoxaparin injection 40 mg    etomidate injection 12.8 mg    famotidine tablet 20 mg    fentaNYL 2500 mcg in D5W 250 mL infusion premix (titrating) (conc: 10 mcg/mL)    lamotrigine tablet 200 mg    lorazepam injection 2 mg    phenobarbital 20 mg/5 mL (4 mg/mL) elixir 64.8 mg    piperacillin-tazobactam 4.5 g in dextrose 5 % 100 mL IVPB (ready to mix system)    propofol (DIPRIVAN) 10 mg/mL infusion    sodium chloride 0.9% flush 3 mL    topiramate tablet 200 mg    vancomycin (VANCOCIN) 750 mg in dextrose 5 % 250 mL IVPB     METHODOLOGY   Electroencephalographic (EEG) recording is with electrodes placed according to the International 10-20 placement system.  Thirty two (32) channels of digital signal are simultaneously recorded from the scalp and may include EKG, EMG, and/or eye monitors.   Recording band pass was 0.1 to 512 hz.  Digital video recording of the patient is simultaneously recorded with the EEG.  The nursing staff report clinical symptoms and may press an event button when the patient has symptoms of clinical interest to the treating physicians.  EEG and video recording is stored and archived in digital format.  The entire recording is visually reviewed and the times identified by computer analysis as being spikes or seizures are reviewed again.  Activation procedures which include photic stimulation, hyperventilation and instructing patients to perform simple  task are done in selected patients.   Compresses spectral analysis (CSA) is also performed on the activity recorded from each individual channel.  This is displayed as a power display of frequencies from 0 to 30 Hz over time.   The CSA analysis is done and displayed continuously.  This is reviewed for asymmetries in power between homologous areas of the scalp and for presence of changes in power which canbe seen when seizures occur.  Sections of suspected abnormalities on the CSA is then compared with the original EEG recording.     [employer] software was also utilized in the review of this study.  This software suite analyzes the EEG recording in multiple domains.  Coherence and rhythmicity is computed to identify EEG sections which may contain organized seizures.  Each channel undergoes analysis to detect presence of spike and sharp waves which have special and morphological characteristic of epileptic activity.  The routine EEG recording is converted from spacial into frequency domain.  This is then displayed comparing homologous areas to identify areas of significant asymmetry.  Algorithm to identify non-cortically generated artifact is used to separate eye movement, EMG and other artifact from the EEG.      Recording Times  Start on 02/28/17, 15:02  Stop on 03/01/17, 07:00    A total of 15 hours and 34 minutes of EEG was recorded.    EEG FINDINGS  Background activity:   The background rhythm was characterized by mixture of sharply contoured theta (6-7Hz), delta (2-3Hz) activity with superimposed faster activities.  No posterior dominant rhythm was seen   Symmetry and continuity: the background was continuous and symmetric    Abnormal activity:   No epileptiform discharges, periodic discharges, lateralized rhythmic delta activity or electrographic seizures were seen.  Several episodes of patient trying to pull at the tubes were noted without any abnormal EEG correlate.    IMPRESSION:   This is an abnormal C-EEG  due to the moderate to severe generalized slowing, suggestive of moderate to severe diffuse or multifocal cerebral dysfunction.  No epileptiform activity or electrographic seizures were seen.     CLINICAL CORRELATION IS RECOMMENDED.    Rhianna Patton MD, GENE().  Neurology-Epilepsy.

## 2017-03-01 NOTE — ASSESSMENT & PLAN NOTE
- Long history of autism since his childhood.   - Having such diagnosis, it might be difficult to perform SBT trial since it needs cooperation from patient's side.  - Mother at bedside, hopefully will ease the process.

## 2017-03-01 NOTE — PLAN OF CARE
Problem: Patient Care Overview  Goal: Plan of Care Review  Outcome: Ongoing (interventions implemented as appropriate)  Pt hemodynamically stable throughout shift, off pressors. In SR when afebrile, Tmax 103.5, responded to tylenol. Fluconazole started. 2x boluses albumin given. UOP generous via Dillon to gravity. Pt moves purposefully WA, reaches for ETT, localizes pain, OCAMPO. Fentanyl for comfort. cEEG in place. Moving to 7069.

## 2017-03-01 NOTE — PT/OT/SLP PROGRESS
Physical Therapy      Lamont Villarreal  MRN: [id number]    Patient not seen today secondary to  (pt failed MOVE screen, pt preparing for move to [facility] upon PT attempt). Will follow-up as appropriate.    Mitzi Farris, PT   3/1/2017

## 2017-03-01 NOTE — ASSESSMENT & PLAN NOTE
- Long history of epilepsy since childhood.   - On Clonazepam 0.5 mg QHS  - On Lamotrigine 200 mg PO BID  - Topamax 200 mg BID  - Phenobarbital QHS  - Prelim result from EEG monitor showed no active seizure and some artifact from medication   - Had seizure like activity yesterday and was given one time Ativan 2 mg IV.

## 2017-03-01 NOTE — ASSESSMENT & PLAN NOTE
- Had fever, and positive Strept rapid test on outside hospital  - Most likely the source is upper respiratory tract infection verus Pneumonia   - Chest X ray doesn't show parenchymal consolidation, however giving the acuity of his condition, will cover him with broad spectrum antibiotic   - Send for Blood culture, Viral Respiratory panel and urine culture. BCx showed no growth to date. Resp Sputum sample showed no organism   - On Vanc and Zosyn for the time being, will consider de escalating upon his objective measure improvement   Recent Labs  Lab 02/28/17  1052 02/28/17  1224 03/01/17  0433   WBC 7.80 10.94 13.31*

## 2017-03-01 NOTE — PROGRESS NOTES
Abg was obtained via LR stick and reported to Dr. Moss; based on results orders were given to lower FiO2 from 50% to 40%. Will continue to monitor.

## 2017-03-01 NOTE — ASSESSMENT & PLAN NOTE
Vent Mode: A/C  Oxygen Concentration (%):  [] 40  Resp Rate Total:  [18 br/min-41 br/min] 24 br/min  Vt Set:  [360 mL] 360 mL  PEEP/CPAP:  [5 cmH20] 5 cmH20  Pressure Support:  [0 cmH20] 0 cmH20  Mean Airway Pressure:  [7.5 qyF56-06 cmH20] 10 cmH20    Recent Labs  Lab 02/28/17  1800   PH 7.415   PCO2 29.4*   PO2 152*   HCO3 18.8*   POCSATURATED 99   BE -6   - Improvement of his ABG after adjustment of his RR, will do another ABG this morning and make further adjustment

## 2017-03-01 NOTE — PLAN OF CARE
03/01/17 1537   Discharge Assessment   Assessment Type Discharge Planning Assessment   Confirmed/corrected address and phone number on facesheet? Yes   Assessment information obtained from? Patient   Expected Length of Stay (days) 5   Communicated expected length of stay with patient/caregiver yes   Prior to hospitilization cognitive status: Unable to Assess  (Per mother, patient is non verbal at baseline.  )   Prior to hospitalization functional status: Completely Dependent   Current cognitive status: Coma/Sedated/Intubated   Current Functional Status: Completely Dependent   Arrived From admitted as an inpatient   Lives With parent(s)   Able to Return to Prior Arrangements yes   Is patient able to care for self after discharge? No   How many people do you have in your home that can help with your care after discharge? 1   Who are your caregiver(s) and their phone number(s)? Nolberto Villarreal ( [phone])   Patient's perception of discharge disposition other (comments)  (jasiel)   Readmission Within The Last 30 Days no previous admission in last 30 days   Patient currently being followed by outpatient case management? No   Patient currently receives home health services? No   Does the patient currently use HME? No   Patient currently receives private duty nursing? N/A   Patient currently receives any other outside agency services? No   Equipment Currently Used at Home none   Do you have any problems affording any of your prescribed medications? No   Is the patient taking medications as prescribed? yes   Do you have any financial concerns preventing you from receiving the healthcare you need? No   Does the patient have transportation to healthcare appointments? Yes   Transportation Available none   On Dialysis? No   Does the patient receive services at the Coumadin Clinic? No   Are there any open cases? No   Discharge Plan A Home with family   Discharge Plan B Home with family   Patient/Family In Agreement With Plan yes              PCP:  Hollis Thomas MD      Pharmacy:    [id number] - [place], [place] - [address]  [address]  [place] [address]  Phone: [phone] Fax: [phone]        Emergency Contacts:  Extended Emergency Contact Information  Primary Emergency Contact: Nolberto Villarreal  Address: [id number] [place] [address] [id number] [place] [address]  Home Phone: [phone]  Mobile Phone: [phone]  Relation: Mother      Insurance:  Payor: [employer] BLUE [employer]IELD / Plan: [place] PPO / Product Type: PPO /       Kathy Steele RN, CCRN-K, CCM  Neuro-Critical Care   X [phone]

## 2017-03-01 NOTE — SUBJECTIVE & OBJECTIVE
Interval History/Significant Events:     Over night, and upon decrease his Propofol titration, patient was semi awake and pulled out his ETT. It was urgently re intubated and confirm placement with CXR. ABG from yesterday showed improved in his pH, will repeat ABG this morning and make the adjustment through ventilator.     Review of Systems   Unable to perform ROS: Intubated     Objective:     Vital Signs (Most Recent):  Temp: 100.3 °F (37.9 °C) (03/01/17 0400)  Pulse: 101 (03/01/17 0600)  Resp: (!) 24 (03/01/17 0600)  BP: 104/64 (03/01/17 0600)  SpO2: 100 % (03/01/17 0600) Vital Signs (24h Range):  Temp:  [96.6 °F (35.9 °C)-101.8 °F (38.8 °C)] 100.3 °F (37.9 °C)  Pulse:  [] 101  Resp:  [17-25] 24  SpO2:  [84 %-100 %] 100 %  BP: ()/(34-82) 104/64   Weight: 42.6 kg (94 lb)  Body mass index is 16.78 kg/(m^2).      Intake/Output Summary (Last 24 hours) at 03/01/17 0729  Last data filed at 03/01/17 0600   Gross per 24 hour   Intake          2339.48 ml   Output             1725 ml   Net           614.48 ml       Physical Exam   Constitutional: He appears well-developed and well-nourished.   HENT:   Head: Normocephalic and atraumatic.   Right Ear: External ear normal.   Eyes: EOM are normal. Pupils are equal, round, and reactive to light. Right eye exhibits no discharge. Left eye exhibits no discharge.   Neck: Normal range of motion. Neck supple. No JVD present. No thyromegaly present.   Cardiovascular: Normal rate and regular rhythm.  Exam reveals no gallop and no friction rub.    No murmur heard.  Pulmonary/Chest: He has wheezes. He has rales.   Tachypnea    Musculoskeletal: He exhibits no edema or deformity.   Neurological:   Look ill. Higher respiratory breathing work.    Skin: No rash noted. He is not diaphoretic. No erythema.       Vents:  Vent Mode: A/C (03/01/17 0531)  Set Rate: 24 bmp (03/01/17 0531)  Vt Set: 360 mL (03/01/17 0531)  Pressure Support: 0 cmH20 (03/01/17 0531)  PEEP/CPAP: 5 cmH20  (03/01/17 0531)  Oxygen Concentration (%): 40 (03/01/17 0600)  Peak Airway Pressure: 24 cmH2O (03/01/17 0531)  Total Ve: 9.24 mL (03/01/17 0531)  F/VT Ratio<105 (RSBI): (!) (P) 51.72 (03/01/17 0531)  Lines/Drains/Airways     Central Venous Catheter Line                 CVC Triple Lumen - PreSep Cath 02/28/17 1313 right internal jugular less than 1 day          Drain                 Urethral Catheter 02/28/17 1 day         NG/OG Tube 02/28/17 1123 18 Fr. Center mouth less than 1 day          Airway                 Airway - Non-Surgical 02/28/17 1138 Endotracheal Tube-Hi/Lo;Endotracheal Tube less than 1 day          Peripheral Intravenous Line                 Peripheral IV - Single Lumen 02/28/17 0510 Right Forearm 1 day         Peripheral IV - Single Lumen 02/28/17 0553 Right Forearm 1 day         Peripheral IV - Single Lumen 02/28/17 1055 Left Forearm less than 1 day              Significant Labs:    CBC/Anemia Profile:    Recent Labs  Lab 02/28/17  1052 02/28/17  1224 03/01/17  0433   WBC 7.80 10.94 13.31*   HGB 11.4* 11.1* 10.9*   HCT 34.2* 33.8* 32.3*   * 128* 130*   MCV 92 94 91   RDW 13.6 13.6 13.6        Chemistries:    Recent Labs  Lab 02/28/17  0752  02/28/17  1224 02/28/17  1549 02/28/17  2049 03/01/17  0047 03/01/17  0433     < > 138  138 137 137 133* 135*   K <2.0*  < > 5.4*  5.4* 4.1 3.7 5.0 3.7   *  < > 114*  114* 111* 113* 112* 111*   CO2 11*  < > 17*  17* 19* 19* 15* 18*   BUN 8  < > 11  11 9 7 8 8   CREATININE 0.4*  < > 0.7  0.7 0.7 0.6 0.7 0.7   CALCIUM 4.0*  < > 6.9*  6.9* 7.2* 7.1* 7.1* 7.4*   ALBUMIN 1.6*  < > 3.1*  3.1* 3.1* 2.8* 2.6* 2.7*   PROT 2.8*  < > 5.7*  5.7* 5.7* 5.1* 5.3* 5.2*   BILITOT 0.3  < > 0.6  0.6 0.6 0.8 0.7 1.1*   ALKPHOS 49*  < > 94  94 86 78 74 93   ALT 20  < > 45*  45* 43 41 41 52*   AST 24  < > 58*  58* 49* 52* 56* 74*   MG <0.7*  --  1.9 2.1  --   --   --    < > = values in this interval not displayed.

## 2017-03-01 NOTE — NURSING
Pt BP noted 83/51. Notified Dr. Shukla. New orders noted including stopping the propofol gtt and monitor.

## 2017-03-02 PROBLEM — J69.0 ASPIRATION PNEUMONIA: Status: ACTIVE | Noted: 2017-03-02

## 2017-03-02 LAB
ALBUMIN SERPL BCP-MCNC: 3.4 G/DL
ALBUMIN SERPL BCP-MCNC: 3.5 G/DL
ALLENS TEST: ABNORMAL
ALP SERPL-CCNC: 110 U/L
ALP SERPL-CCNC: 146 U/L
ALT SERPL W/O P-5'-P-CCNC: 37 U/L
ALT SERPL W/O P-5'-P-CCNC: 38 U/L
ANION GAP SERPL CALC-SCNC: 5 MMOL/L
ANION GAP SERPL CALC-SCNC: 6 MMOL/L
AST SERPL-CCNC: 51 U/L
AST SERPL-CCNC: 52 U/L
BACTERIA SPEC AEROBE CULT: NORMAL
BASOPHILS # BLD AUTO: 0.01 K/UL
BASOPHILS NFR BLD: 0.1 %
BILIRUB SERPL-MCNC: 0.8 MG/DL
BILIRUB SERPL-MCNC: 0.8 MG/DL
BUN SERPL-MCNC: 6 MG/DL
BUN SERPL-MCNC: 6 MG/DL
CALCIUM SERPL-MCNC: 8.2 MG/DL
CALCIUM SERPL-MCNC: 8.6 MG/DL
CHLORIDE SERPL-SCNC: 107 MMOL/L
CHLORIDE SERPL-SCNC: 111 MMOL/L
CO2 SERPL-SCNC: 21 MMOL/L
CO2 SERPL-SCNC: 22 MMOL/L
CREAT SERPL-MCNC: 0.7 MG/DL
CREAT SERPL-MCNC: 0.7 MG/DL
DELSYS: ABNORMAL
DIFFERENTIAL METHOD: ABNORMAL
EOSINOPHIL # BLD AUTO: 0.5 K/UL
EOSINOPHIL NFR BLD: 3.9 %
ERYTHROCYTE [DISTWIDTH] IN BLOOD BY AUTOMATED COUNT: 13.6 %
ERYTHROCYTE [SEDIMENTATION RATE] IN BLOOD BY WESTERGREN METHOD: 18 MM/H
EST. GFR  (AFRICAN AMERICAN): >60 ML/MIN/1.73 M^2
EST. GFR  (AFRICAN AMERICAN): >60 ML/MIN/1.73 M^2
EST. GFR  (NON AFRICAN AMERICAN): >60 ML/MIN/1.73 M^2
EST. GFR  (NON AFRICAN AMERICAN): >60 ML/MIN/1.73 M^2
FIO2: 40
FLOW: 60
GLUCOSE SERPL-MCNC: 109 MG/DL
GLUCOSE SERPL-MCNC: 86 MG/DL
GRAM STN SPEC: NORMAL
HCO3 UR-SCNC: 19.8 MMOL/L (ref 24–28)
HCT VFR BLD AUTO: 30.6 %
HGB BLD-MCNC: 10.2 G/DL
LAMOTRIGINE SERPL-MCNC: 1.5 UG/ML (ref 2–15)
LYMPHOCYTES # BLD AUTO: 1.2 K/UL
LYMPHOCYTES NFR BLD: 9.8 %
MAGNESIUM SERPL-MCNC: 1.9 MG/DL
MCH RBC QN AUTO: 30.5 PG
MCHC RBC AUTO-ENTMCNC: 33.3 %
MCV RBC AUTO: 92 FL
MODE: ABNORMAL
MONOCYTES # BLD AUTO: 0.6 K/UL
MONOCYTES NFR BLD: 4.7 %
NEUTROPHILS # BLD AUTO: 10.2 K/UL
NEUTROPHILS NFR BLD: 81.2 %
PCO2 BLDA: 35.4 MMHG (ref 35–45)
PEEP: 5
PH SMN: 7.36 [PH] (ref 7.35–7.45)
PHOSPHATE SERPL-MCNC: 1.3 MG/DL
PIP: 23
PLATELET # BLD AUTO: 125 K/UL
PMV BLD AUTO: 10.7 FL
PO2 BLDA: 183 MMHG (ref 80–100)
POC BE: -6 MMOL/L
POC SATURATED O2: 100 % (ref 95–100)
POC TCO2: 21 MMOL/L (ref 23–27)
POCT GLUCOSE: 64 MG/DL (ref 70–110)
POCT GLUCOSE: 64 MG/DL (ref 70–110)
POCT GLUCOSE: 71 MG/DL (ref 70–110)
POCT GLUCOSE: 74 MG/DL (ref 70–110)
POCT GLUCOSE: 90 MG/DL (ref 70–110)
POTASSIUM SERPL-SCNC: 3.6 MMOL/L
POTASSIUM SERPL-SCNC: 4.3 MMOL/L
PROT SERPL-MCNC: 6.1 G/DL
PROT SERPL-MCNC: 6.7 G/DL
PS: 14
RBC # BLD AUTO: 3.34 M/UL
SAMPLE: ABNORMAL
SITE: ABNORMAL
SODIUM SERPL-SCNC: 135 MMOL/L
SODIUM SERPL-SCNC: 137 MMOL/L
SP02: 100
VANCOMYCIN TROUGH SERPL-MCNC: 6.7 UG/ML
VT: 360
WBC # BLD AUTO: 12.51 K/UL

## 2017-03-02 PROCEDURE — 25000003 PHARM REV CODE 250: Performed by: INTERNAL MEDICINE

## 2017-03-02 PROCEDURE — 94150 VITAL CAPACITY TEST: CPT

## 2017-03-02 PROCEDURE — 99900035 HC TECH TIME PER 15 MIN (STAT)

## 2017-03-02 PROCEDURE — 95813 EEG EXTND MNTR 61-119 MIN: CPT | Mod: 26,,, | Performed by: PSYCHIATRY & NEUROLOGY

## 2017-03-02 PROCEDURE — 94003 VENT MGMT INPAT SUBQ DAY: CPT

## 2017-03-02 PROCEDURE — 99223 1ST HOSP IP/OBS HIGH 75: CPT | Mod: ,,, | Performed by: PSYCHIATRY & NEUROLOGY

## 2017-03-02 PROCEDURE — 27000221 HC OXYGEN, UP TO 24 HOURS

## 2017-03-02 PROCEDURE — 25000003 PHARM REV CODE 250: Performed by: PSYCHIATRY & NEUROLOGY

## 2017-03-02 PROCEDURE — 83735 ASSAY OF MAGNESIUM: CPT

## 2017-03-02 PROCEDURE — 25000003 PHARM REV CODE 250: Performed by: STUDENT IN AN ORGANIZED HEALTH CARE EDUCATION/TRAINING PROGRAM

## 2017-03-02 PROCEDURE — 20000000 HC ICU ROOM

## 2017-03-02 PROCEDURE — 84100 ASSAY OF PHOSPHORUS: CPT

## 2017-03-02 PROCEDURE — 82803 BLOOD GASES ANY COMBINATION: CPT

## 2017-03-02 PROCEDURE — 63600175 PHARM REV CODE 636 W HCPCS: Performed by: STUDENT IN AN ORGANIZED HEALTH CARE EDUCATION/TRAINING PROGRAM

## 2017-03-02 PROCEDURE — 97165 OT EVAL LOW COMPLEX 30 MIN: CPT

## 2017-03-02 PROCEDURE — 63600175 PHARM REV CODE 636 W HCPCS: Performed by: ANESTHESIOLOGY

## 2017-03-02 PROCEDURE — 99233 SBSQ HOSP IP/OBS HIGH 50: CPT | Mod: ,,, | Performed by: PSYCHIATRY & NEUROLOGY

## 2017-03-02 PROCEDURE — 27200966 HC CLOSED SUCTION SYSTEM

## 2017-03-02 PROCEDURE — 80053 COMPREHEN METABOLIC PANEL: CPT

## 2017-03-02 PROCEDURE — 36600 WITHDRAWAL OF ARTERIAL BLOOD: CPT

## 2017-03-02 PROCEDURE — 85025 COMPLETE CBC W/AUTO DIFF WBC: CPT

## 2017-03-02 RX ORDER — SODIUM,POTASSIUM PHOSPHATES 280-250MG
2 POWDER IN PACKET (EA) ORAL
Status: DISCONTINUED | OUTPATIENT
Start: 2017-03-02 | End: 2017-03-03

## 2017-03-02 RX ORDER — SODIUM CHLORIDE, SODIUM LACTATE, POTASSIUM CHLORIDE, CALCIUM CHLORIDE 600; 310; 30; 20 MG/100ML; MG/100ML; MG/100ML; MG/100ML
INJECTION, SOLUTION INTRAVENOUS CONTINUOUS
Status: DISCONTINUED | OUTPATIENT
Start: 2017-03-02 | End: 2017-03-03

## 2017-03-02 RX ADMIN — POTASSIUM & SODIUM PHOSPHATES POWDER PACK 280-160-250 MG 2 PACKET: 280-160-250 PACK at 09:03

## 2017-03-02 RX ADMIN — Medication 3 ML: at 05:03

## 2017-03-02 RX ADMIN — LAMOTRIGINE 200 MG: 100 TABLET ORAL at 08:03

## 2017-03-02 RX ADMIN — Medication 3 ML: at 09:03

## 2017-03-02 RX ADMIN — PIPERACILLIN SODIUM AND TAZOBACTAM SODIUM 4.5 G: 4; .5 INJECTION, POWDER, LYOPHILIZED, FOR SOLUTION INTRAVENOUS at 09:03

## 2017-03-02 RX ADMIN — ENOXAPARIN SODIUM 40 MG: 100 INJECTION SUBCUTANEOUS at 11:03

## 2017-03-02 RX ADMIN — TOPIRAMATE 200 MG: 200 TABLET, FILM COATED ORAL at 08:03

## 2017-03-02 RX ADMIN — DIAZEPAM 10 MG: 5 TABLET ORAL at 08:03

## 2017-03-02 RX ADMIN — SODIUM CHLORIDE, SODIUM LACTATE, POTASSIUM CHLORIDE, AND CALCIUM CHLORIDE: .6; .31; .03; .02 INJECTION, SOLUTION INTRAVENOUS at 05:03

## 2017-03-02 RX ADMIN — VANCOMYCIN HYDROCHLORIDE 750 MG: 1 INJECTION, POWDER, LYOPHILIZED, FOR SOLUTION INTRAVENOUS at 12:03

## 2017-03-02 RX ADMIN — FAMOTIDINE 20 MG: 20 TABLET, FILM COATED ORAL at 08:03

## 2017-03-02 RX ADMIN — POTASSIUM & SODIUM PHOSPHATES POWDER PACK 280-160-250 MG 2 PACKET: 280-160-250 PACK at 05:03

## 2017-03-02 RX ADMIN — CHLORHEXIDINE GLUCONATE 15 ML: 1.2 RINSE ORAL at 08:03

## 2017-03-02 RX ADMIN — VANCOMYCIN HYDROCHLORIDE 1000 MG: 1 INJECTION, POWDER, LYOPHILIZED, FOR SOLUTION INTRAVENOUS at 11:03

## 2017-03-02 RX ADMIN — PIPERACILLIN SODIUM AND TAZOBACTAM SODIUM 4.5 G: 4; .5 INJECTION, POWDER, LYOPHILIZED, FOR SOLUTION INTRAVENOUS at 12:03

## 2017-03-02 RX ADMIN — ACETAMINOPHEN 650 MG: 325 TABLET, FILM COATED ORAL at 12:03

## 2017-03-02 RX ADMIN — CLONAZEPAM 0.5 MG: 0.5 TABLET ORAL at 08:03

## 2017-03-02 RX ADMIN — PIPERACILLIN SODIUM AND TAZOBACTAM SODIUM 4.5 G: 4; .5 INJECTION, POWDER, LYOPHILIZED, FOR SOLUTION INTRAVENOUS at 02:03

## 2017-03-02 RX ADMIN — PHENOBARBITAL 194.4 MG: 20 ELIXIR ORAL at 08:03

## 2017-03-02 NOTE — PT/OT/SLP PROGRESS
Physical Therapy      Lamont Villarreal  MRN: [id number]    Patient not seen today secondary to Other (Comment).Pt intubated. Only appropriate for one discipline. OT  Performed evaluation. Per OT, pt at baseline with services. PT will continue to follow and reassess pt when extubated.  Will check daily in rounds.      Davina Langley, PT, DPT  3/2/2017

## 2017-03-02 NOTE — PROGRESS NOTES
Progress Note  Neuro Critical Care    Admit Date: 2/28/2017   Length of Stay:  LOS: 2 days     SUBJECTIVE:   Follow-up For: Sepsis    Interval History:  No acute issues overnight    Brief HPI:  21 year old male known to have Epilepsy, Seizure, Autism, Developmental delay presented to outside hospital ED ([facility]) after he developed two episodes of seizure in the yesterday, his aunt brought him to outside hospital. They had some laboratory investigations for his and showed to have a positive Strept Throat and negative Flu. Brought to hospital by EMS and [place] had a [place] [facility] [place] one time IV Midazolam. He suppose to be admitted to pediatric floor, but giving his seizure he was brought to ED. He had another seizure in ED which counts him as the fourth seizure in the last 24 hours.      His history source was his aunt and some history from his aunt and briefly from his mother over the phone. He was on his usual state of health until yesterday, when he developed cough, and looking sick. He had fever and upper respiratory tract symptoms. No recent travel, no sick contact or change in his medication. Family unsure about the last time he had seizure. Of note, he is following Dr. Ortez as his pediatric neurologist, with last visit on 4/2016.     Review of Systems:  Review of systems not obtained due to patient factors intubated, sedated.    OBJECTIVE:     Vital Signs (Most Recent):  Temp: 98.4 °F (36.9 °C) (03/02/17 0715)  Pulse: (!) 57 (03/02/17 0715)  Resp: 18 (03/02/17 0715)  BP: 131/88 (03/02/17 0715)  SpO2: 98 % (03/02/17 0715) Vital Signs Range (Last 24H):  Temp:  [96.1 °F (35.6 °C)-101.7 °F (38.7 °C)]   Pulse:  []   Resp:  [3-30]   BP: ()/(50-88)   SpO2:  [96 %-100 %]      Body mass index is 16.78 kg/(m^2).     I & O (Last 24H):    Intake/Output Summary (Last 24 hours) at 03/02/17 0909  Last data filed at 03/02/17 0900   Gross per 24 hour   Intake          2629.79 ml   Output              4205 ml   Net         -1575.21 ml       Ventilator:  Vent Mode: SIMV (VC) + PS  Oxygen Concentration (%):  [40] 40  Resp Rate Total:  [18 br/min-25 br/min] 18 br/min  Vt Set:  [360 mL] 360 mL  PEEP/CPAP:  [5 cmH20] 5 cmH20  Pressure Support:  [14 cmH20] 14 cmH20  Mean Airway Pressure:  [8.6 cmH20-9.4 cmH20] 9.4 cmH20          Physical Exam:  MS: Opens his eyes to voice, tracks, intermittently reported to follow commands but was not this morning  CN: II-XII 2mm reactive pupils.  Motor: spontaneous movement of all 4 extremities noted  DTR: Not tested  Coordination /Fine motor: Not able to assess  Gait: Not tested.  Meningeal signs: Absent    Laboratory:    Recent Labs  Lab 02/28/17  1224 03/01/17  0433 03/02/17  0234   WBC 10.94 13.31* 12.51   HGB 11.1* 10.9* 10.2*   HCT 33.8* 32.3* 30.6*   * 130* 125*         Recent Labs  Lab 03/01/17  0433 03/01/17  1303 03/01/17  1938   * 136 135*   K 3.7 3.5 3.8   * 110 110   CO2 18* 21* 20*   BUN 8 8 8   CREATININE 0.7 0.7 0.7   CALCIUM 7.4* 7.6* 7.7*   PROT 5.2* 5.3* 5.6*   BILITOT 1.1* 0.7 0.8   ALKPHOS 93 83 84   ALT 52* 43 41   AST 74* 59* 58*         Recent Labs  Lab 03/01/17  0433 03/01/17  1303 03/02/17  0234   MG 1.7 1.8 1.9   PHOS  --   --  1.3*         Recent Labs  Lab 02/28/17  1224   APTT 27.5   INR 1.2       Microbiology Results (last 7 days)     Procedure Component Value Units Date/Time    Blood Culture #2 **CANNOT BE ORDERED STAT** [[id number]] Collected:  02/28/17 0554    Order Status:  Completed Specimen:  Blood from Peripheral, Antecubital, Left Updated:  03/02/17 0812     Blood Culture, Routine No Growth to date     Blood Culture, Routine No Growth to date     Blood Culture, Routine No Growth to date    Blood Culture #1 **CANNOT BE ORDERED STAT** [[id number]] Collected:  02/28/17 0553    Order Status:  Completed Specimen:  Blood from Peripheral, Forearm, Right Updated:  03/02/17 0812     Blood Culture, Routine No Growth to date      Blood Culture, Routine No Growth to date     Blood Culture, Routine No Growth to date    Urine culture [[id number]] Collected:  02/28/17 0554    Order Status:  Completed Specimen:  Urine from Catheterized Updated:  03/01/17 1336     Urine Culture, Routine No growth    Narrative:       Urine culture added per Dr. Bradford, order ID [id number] 02/28/17 11:01    Culture, Respiratory [[id number]] Collected:  02/28/17 1750    Order Status:  Completed Specimen:  Respiratory from Sputum Updated:  03/01/17 1016     Respiratory Culture No Growth     Gram Stain (Respiratory) <10 epithelial cells per low power field.     Gram Stain (Respiratory) Few WBC's     Gram Stain (Respiratory) No organisms seen    Respiratory Viral Panel by PCR [[id number]] Collected:  02/28/17 1909    Order Status:  Completed Updated:  03/01/17 0951     Respiratory Virus Panel, source Nasopharyngeal Swab    Respiratory Viral Panel by PCR [[id number]] Collected:  02/28/17 1241    Order Status:  Completed Updated:  03/01/17 0947     Respiratory Virus Panel, source NP swab    Respiratory virus antigens panel [[id number]] Collected:  02/28/17 1549    Order Status:  Canceled Specimen:  Respiratory from Nasopharyngeal Swab Updated:  02/28/17 1823    Narrative:       Respiratory Viruses - DFA was cancelled on 02/28/2017 at 19:09 by   EMC1; Test changed to sendout.  02/28/2017  19:09    Respiratory virus antigens panel [[id number]] Collected:  02/28/17 1057    Order Status:  Canceled Specimen:  Respiratory from Nasopharyngeal Swab Updated:  02/28/17 1107    Narrative:       Respiratory Viruses - DFA was cancelled on 02/28/2017 at 12:41 by   MPE; Test changed to sendout    Urine culture [[id number]]     Order Status:  Completed Specimen:  Urine from Urine, Catheterized     Urine culture [[id number]]     Order Status:  Canceled Specimen:  Urine           ASSESSMENT/PLAN:   Hospital Problems:  Active Hospital Problems    Diagnosis  POA    *Sepsis [A41.9]  Yes     Aspiration pneumonia [J69.0]  Unknown    On mechanically assisted ventilation [Z99.11]  Not Applicable    Seizure [R56.9]  Yes    Autism [F84.0]  Yes    Developmental delay [R62.50]  Yes    Epilepsy [G40.909]  Yes      Resolved Hospital Problems    Diagnosis Date Resolved POA   No resolved problems to display.       Primary Diagnosis:  Sepsis    Plan:  Neuro:   Autism, history of seizure disorder  - seizure d/o previously controlled on topamax 200mg BID, lamictal 200mg BUD, phenobarbital 64.8mg (3 at bedtime), diazepam 10 TID, klonopin 0.5mg qpm (per outpatient)    Recurrent seizures  - EEG (2/28): Generalized slowing with a mixture of theta (6-7Hz), delta (1-3Hz) as well as alpha/beta activity - likely related to meds.  No epileptiform discharges of electrographic seizures seen.  - klonopin 0.5mg qd  - topamax 200 BID  - diazepam 10mg BID  - lamictal 200mg BID  - phenobarb 64.8mg qpm    - fentanyl     Pulmonary:   Ventilator:  Vent Mode: Spont  Oxygen Concentration (%):  [40] 40  Resp Rate Total:  [15 br/min-25 br/min] 15 br/min  Vt Set:  [360 mL] 360 mL  PEEP/CPAP:  [5 cmH20] 5 cmH20  Pressure Support:  [5 cuZ57-13 cmH20] 5 cmH20  Mean Airway Pressure:  [8.6 cmH20-9.5 cmH20] 9.5 cmH20     Daily CXR (3/1/2017): some interstitial markings bilaterally  ABG (3/1/2017): 7.35/35/183/    Extubate today    Cardiac:   No active issues    Renal:   I & O (Last 24H):    Intake/Output Summary (Last 24 hours) at 03/02/17 0909  Last data filed at 03/02/17 0900   Gross per 24 hour   Intake          2629.79 ml   Output             4205 ml   Net         -1575.21 ml       Infectious Disease:   - febrile episodes this morning - Tm of 101.7F  - leukocytosis 13.31 this morning; initial labs on admit remarkable for leukopenia (unsure if lab results are an error or if leukopenia was side effect of AED's); last labs from 2014  - vanc 1g q12, zosyn  - RCx (2/28): no orgs  - resp viral panel pending  - BCx (2/28): SELENE  - Ucx (2/28):  pending; however UA unremarkable    Hematology/Oncology:  H/H: 10.2/30.6  Platelets: [id number]  PT/INR: 1.2     Endocrine:   POCT glucose 64-90  SSI    Fluids/Electrolytes/Nutrition/GI:   IVF @50cc/hr    Prophylaxis: lovenox    Lines:  CVC triple lumen RIJ 2/28  Peripheral IV x3  Dillon 2/28  NG/OG 2/28  ET tube 2/28    Dispo: extubated today. Seizure control     Rinu Manacheril  PGY 2

## 2017-03-02 NOTE — PT/OT/SLP EVAL
Occupational Therapy  Evaluation/Discharge Summary    Lamont Villarreal   MRN: [id number]   Admitting Diagnosis: Sepsis    OT Date of Treatment: 03/02/17   OT Start Time: 0911  OT Stop Time: 0924  OT Total Time (min): 13 min    Billable Minutes:  Evaluation 13    Diagnosis: Sepsis     Past Medical History:   Diagnosis Date    Developmental delay     Mental disorder     Seizures       History reviewed. No pertinent surgical history.    Referring physician: Ajay  Date referred to OT: 2/28    General Precautions: Standard, fall, seizure, NPO  Orthopedic Precautions: N/A  Braces: N/A    Do you have any cultural, spiritual, Jain conflicts, given your current situation?: none noted     Patient History:  Living Environment  Living Environment Comment: Pt intubated and pt's mother reports pt is non-verbal at baseline. PLOF/social history obtained from mother. Pt lives with mother and 4 younger siblings ( 9 m/o, 7,12,17 y/o) in a [facility] with 0 EZEQUIEL; tub present in the bathroom. PTA, pt dependent with all ADLs except (I) with feeding. Pt was (I) with functional mobility except required (A) when ambulating stairs. Pt's mother is primary caregiver. Pt was not receiving any type of therapy prior to hospital stay.  Equipment Currently Used at Home: none    Prior level of function:   Bed Mobility/Transfers: needs assist  Grooming: needs assist  Bathing: needs assist  Upper Body Dressing: needs assist  Lower Body Dressing: needs assist  Toileting: needs assist       Subjective:  Communicated with nsg prior to session.    Chief Complaint: Pt unable to state  Patient/Family stated goals: Pt unable to state    Pain Rating:  (No indication of pain)  Pain Rating Post-Intervention:  (No indication of pain)    Objective:  Patient found with: arterial line, blood pressure cuff, geronimo catheter, oxygen, peripheral IV, pulse ox (continuous), restraints, telemetry, ventilator    Cognitive Exam:  Oriented to: Pt is intubated and nonverbal at  "baseline  Follows Commands/attention: Follows one-step commands 5% of the time  Communication: Nonverbal at baseline  Safety awareness/insight to disability: impaired    Physical Exam:  Postural examination/scapula alignment: Rounded shoulder  Skin integrity: Visible skin intact  Edema: None noted B UE/LE    Sensation:   Unable to assess 2* pt intubated and nonverbal/has cognitive deficits at baseline    Upper Extremity Range of Motion:  Right Upper Extremity: WFL  Left Upper Extremity: WFL    Upper Extremity Strength:  Right Upper Extremity: Not assessed 2* pt unable to follow commands of testing  Left Upper Extremity: Not assessed 2* pt unable to follow commands of testing   Strength: Unable to assess     Fine motor coordination:   Unable to assess    Gross motor coordination: Unable to assess    Functional Mobility:  Pt dependent with bed mobility this date.    Activities of Daily Living:     Feeding Level of Assistance: NPO at this time       Therapeutic Activities and Exercises:  Pt/mother educated on OT role and POC; whiteboard updated    AM-PAC 6 CLICK ADL  How much help from another person does this patient currently need?  1 = Unable, Total/Dependent Assistance  2 = A lot, Maximum/Moderate Assistance  3 = A little, Minimum/Contact Guard/Supervision  4 = None, Modified [place]/Independent    Putting on and taking off regular lower body clothing? : 1  Bathing (including washing, rinsing, drying)?: 1  Toileting, which includes using toilet, bedpan, or urinal? : 1  Putting on and taking off regular upper body clothing?: 1  Taking care of personal grooming such as brushing teeth?: 1  Eating meals?: 1  Total Score: 6    AM-PAC Raw Score CMS "G-Code Modifier Level of Impairment Assistance   6 % Total / Unable   7 - 9 CM 80 - 100% Maximal Assist   10 - 14 CL 60 - 80% Moderate Assist   15 - 19 CK 40 - 60% Moderate Assist   20 - 22 CJ 20 - 40% Minimal Assist   23 CI 1-20% SBA / CGA   24 CH 0% " Independent/ Mod I       Patient left supine with all lines intact, call button in reach, nsg notified and mother present    Assessment:  Lamont Villarreal is a 21 y.o. male with a medical diagnosis of Sepsis. Pt intubated this date. He is at baseline with self care and services are not warranted at this time. Pt's mother reports pt dependent with ADLs at baseline. Pt would benefit from PT evaluation once extubated to assess mobility 2* pt (I) with ambulation except (A) with stairs prior to hospital stay. Pt discharged from acute skilled OT this date.    Rehab identified problem list/impairments: Rehab identified problem list/impairments: impaired functional mobilty, gait instability, impaired self care skills, impaired balance, impaired cognition, decreased safety awareness (Pt is at baseline with self care tasks)    Discharge recommendations: Discharge Facility/Level Of Care Needs: home (Pt is at baseline with self care tasks.)     Barriers to discharge: Barriers to Discharge: None    Equipment recommendations: none     GOALS:   Occupational Therapy Goals     Not on file      Multidisciplinary Problems (Resolved)        Problem: Occupational Therapy Goal    Goal Priority Disciplines Outcome Interventions   Occupational Therapy Goal   (Resolved)     OT, PT/OT Outcome(s) achieved              PLAN:  Pt discharged from acute skilled OT this date. PT to evaluate pt's mobility needs once extubated.       TAMARA Ford/CAMACHO  03/02/2017

## 2017-03-02 NOTE — PROGRESS NOTES
Pt received from 3 rd floor intubated and placed on vent in SIMV mode with previous setting used.  Pt well saturated and breathing unlabored.

## 2017-03-02 NOTE — PROGRESS NOTES
Pt extubated following review of parameters and a CPAP trial.  Presently, he is wellsaturated and breathing unlabored.   Will wean O2 as tolerates.

## 2017-03-02 NOTE — PROCEDURES
ICU EEG/VIDEO MONITORING REPORT    Lamont Villarreal  [id number]  1995    DATE OF SERVICE: 3/1 - 2/17    DATE OF ADMISSION: 2/28/2017  4:53 AM    ADMITTING PROVIDER: Tramaine Gamble MD    REASON FOR CONSULT: 22yo M with hx of epilepsy since infancy, admitted after several witnessed seizures related to LTRI.    MEDICATIONS:   Current Facility-Administered Medications   Medication    0.9%  NaCl infusion    acetaminophen tablet 650 mg    chlorhexidine 0.12 % solution 15 mL    clonazePAM tablet 0.5 mg    diazePAM tablet 10 mg    enoxaparin injection 40 mg    etomidate injection 12.8 mg    famotidine tablet 20 mg    fentaNYL 2500 mcg in D5W 250 mL infusion premix (titrating) (conc: 10 mcg/mL)    fluconazole in NaCl 100 MG/50 ML IV PGBK    lamotrigine tablet 200 mg    lorazepam injection 2 mg    phenobarbital 20 mg/5 mL (4 mg/mL) elixir 194.4 mg    piperacillin-tazobactam 4.5 g in dextrose 5 % 100 mL IVPB (ready to mix system)    potassium, sodium phosphates 280-160-250 mg packet 2 packet    potassium, sodium phosphates 280-160-250 mg packet 2 packet    potassium, sodium phosphates 280-160-250 mg packet 2 packet    propofol (DIPRIVAN) 10 mg/mL infusion    sodium chloride 0.9% flush 3 mL    topiramate tablet 200 mg    vancomycin 1 g in dextrose 5 % 250 mL IVPB (ready to mix system)     METHODOLOGY   Electroencephalographic (EEG) recording is with electrodes placed according to the International 10-20 placement system.  Thirty two (32) channels of digital signal are simultaneously recorded from the scalp and may include EKG, EMG, and/or eye monitors.   Recording band pass was 0.1 to 512 hz.  Digital video recording of the patient is simultaneously recorded with the EEG.  The nursing staff report clinical symptoms and may press an event button when the patient has symptoms of clinical interest to the treating physicians.  EEG and video recording is stored and archived in digital format.  The entire  recording is visually reviewed and the times identified by computer analysis as being spikes or seizures are reviewed again.  Activation procedures which include photic stimulation, hyperventilation and instructing patients to perform simple task are done in selected patients.   Compresses spectral analysis (CSA) is also performed on the activity recorded from each individual channel.  This is displayed as a power display of frequencies from 0 to 30 Hz over time.   The CSA analysis is done and displayed continuously.  This is reviewed for asymmetries in power between homologous areas of the scalp and for presence of changes in power which canbe seen when seizures occur.  Sections of suspected abnormalities on the CSA is then compared with the original EEG recording.     [employer]ed [employer] software was also utilized in the review of this study.  This software suite analyzes the EEG recording in multiple domains.  Coherence and rhythmicity is computed to identify EEG sections which may contain organized seizures.  Each channel undergoes analysis to detect presence of spike and sharp waves which have special and morphological characteristic of epileptic activity.  The routine EEG recording is converted from spacial into frequency domain.  This is then displayed comparing homologous areas to identify areas of significant asymmetry.  Algorithm to identify non-cortically generated artifact is used to separate eye movement, EMG and other artifact from the EEG.      Recording Times  Start on 03/01/17, 07:00  Stop on 03/02/17, 09:26    A total of 26 hours and 26 minutes of EEG was recorded.    EEG FINDINGS  Background activity:   The background rhythm was characterized by mixture of sharply contoured theta (6-7Hz), delta (2-3Hz) activity with superimposed faster activities.  No posterior dominant rhythm was seen   Symmetry and continuity: the background was continuous and symmetric    Abnormal activity:   No epileptiform discharges,  periodic discharges, lateralized rhythmic delta activity or electrographic seizures were seen.    IMPRESSION:   This is an abnormal C-EEG due to the moderate to severe generalized slowing, suggestive of moderate to severe diffuse or multifocal cerebral dysfunction.  No epileptiform activity or electrographic seizures were seen.     CLINICAL CORRELATION IS RECOMMENDED.    Rhianna Patton MD, GENE().  Neurology-Epilepsy.

## 2017-03-02 NOTE — PROGRESS NOTES
Pt arrived per [facility] RN to room 7068 per bed. Pt placed on bedside telemetry monitor, ventilator, and Fentanyl and NS gtts as ordered. While transferring pt from [facility] bed to [facility] bed, EEG electrodes were dislodged; EEG techs notified immediately. Pt oriented to room. BRIAN Merida with NCC team notified of pt arrival. Pt mother at bedside. Will continue to monitor.

## 2017-03-02 NOTE — PLAN OF CARE
Problem: Occupational Therapy Goal  Goal: Occupational Therapy Goal  Outcome: Outcome(s) achieved Date Met:  03/02/17  OT evaluation completed. Pt is at baseline for self care, services are not warranted at this time. PT evaluation recommended once pt extubated to assess mobility.     Mayra Keith, OTR/L  3/2/2017

## 2017-03-02 NOTE — PLAN OF CARE
Problem: Patient Care Overview  Goal: Plan of Care Review  Outcome: Ongoing (interventions implemented as appropriate)  POC reviewed with pt at 0500. Pt unable to verbalize understanding d/t intubation. Questions and concerns addressed. No acute events overnight. Pt remains on Fentanyl gtt and NS gtt per order Pt progressing toward goals. Will continue to monitor. See flowsheets for full assessment and VS info

## 2017-03-02 NOTE — PHYSICIAN QUERY
PT Name: Lamont Villarreal  MR #: [id number]     Physician Query Form - Medication-Correlation for Diagnosis    Reviewer  Ext [id number]  Duyen Resendiz RN    This form is a permanent document in the medical record.     Query Date: March 2, 2017    Dear Provider,   By submitting this query, we are merely seeking further clarification of documentation to reflect the severity of illness of your patient. Please utilize your independent clinical judgment when addressing the question(s) below.                The patient has an order for the following medication(s):    1. potassium chloride 10 mEq in 100 mL IVPB - MAR 2/28  potassium chloride 20 mEq in 100 mL IVPB (FOR CENTRAL LINE ADMINISTRATION ONLY) - MAR 2/28    Potassium = < 2.0 - labs 2/28    2. magnesium sulfate 2g in water 50mL IVPB (premix) - MAR 2/28  Magnesium = < 0.7 - labs 2/28      Please provide the corresponding diagnosis or diagnoses that supports the use of the medication(s).    1. hypokalemia  2. hypomagnesemia

## 2017-03-02 NOTE — PROGRESS NOTES
ICU Attending Note  Neurocritical Care    [id number]    -topiramate, lamotrigine, phenobarbital, diazepam  -extubate  -MAP >65  -stop NS and start LR 50 mL/h  -Zosyn/vancomycin but currently no organism to treat, possibly viral URI as cause for fevers, stop fluconazole as no evidence for invasive fungal infection  -enoxaparin, famotidine    Goal: Continue seizure control. Extubate.

## 2017-03-03 PROBLEM — A41.9 SEPSIS: Status: RESOLVED | Noted: 2017-02-28 | Resolved: 2017-03-03

## 2017-03-03 PROBLEM — Z99.11 ON MECHANICALLY ASSISTED VENTILATION: Status: RESOLVED | Noted: 2017-02-28 | Resolved: 2017-03-03

## 2017-03-03 LAB
ALBUMIN SERPL BCP-MCNC: 3.6 G/DL
ALP SERPL-CCNC: 164 U/L
ALT SERPL W/O P-5'-P-CCNC: 45 U/L
ANION GAP SERPL CALC-SCNC: 9 MMOL/L
ANISOCYTOSIS BLD QL SMEAR: SLIGHT
AST SERPL-CCNC: 60 U/L
BASOPHILS # BLD AUTO: ABNORMAL K/UL
BASOPHILS NFR BLD: 0 %
BILIRUB SERPL-MCNC: 0.8 MG/DL
BUN SERPL-MCNC: 6 MG/DL
CALCIUM SERPL-MCNC: 9 MG/DL
CHLORIDE SERPL-SCNC: 109 MMOL/L
CO2 SERPL-SCNC: 20 MMOL/L
CREAT SERPL-MCNC: 0.7 MG/DL
DIFFERENTIAL METHOD: ABNORMAL
EOSINOPHIL # BLD AUTO: ABNORMAL K/UL
EOSINOPHIL NFR BLD: 5 %
ERYTHROCYTE [DISTWIDTH] IN BLOOD BY AUTOMATED COUNT: 13.2 %
EST. GFR  (AFRICAN AMERICAN): >60 ML/MIN/1.73 M^2
EST. GFR  (NON AFRICAN AMERICAN): >60 ML/MIN/1.73 M^2
GLUCOSE SERPL-MCNC: 90 MG/DL
HCT VFR BLD AUTO: 37.7 %
HGB BLD-MCNC: 12.4 G/DL
LYMPHOCYTES # BLD AUTO: ABNORMAL K/UL
LYMPHOCYTES NFR BLD: 18 %
MAGNESIUM SERPL-MCNC: 2 MG/DL
MCH RBC QN AUTO: 30.1 PG
MCHC RBC AUTO-ENTMCNC: 32.9 %
MCV RBC AUTO: 92 FL
MONOCYTES # BLD AUTO: ABNORMAL K/UL
MONOCYTES NFR BLD: 1 %
NEUTROPHILS NFR BLD: 74 %
NEUTS BAND NFR BLD MANUAL: 2 %
PHOSPHATE SERPL-MCNC: 1.7 MG/DL
PHOSPHATE SERPL-MCNC: 2 MG/DL
PLATELET # BLD AUTO: 142 K/UL
PLATELET BLD QL SMEAR: ABNORMAL
PMV BLD AUTO: 10.4 FL
POCT GLUCOSE: 100 MG/DL (ref 70–110)
POTASSIUM SERPL-SCNC: 3.9 MMOL/L
PROT SERPL-MCNC: 7.3 G/DL
RBC # BLD AUTO: 4.12 M/UL
SODIUM SERPL-SCNC: 138 MMOL/L
TOPIRAMATE SERPL-MCNC: 1.6 MCG/ML
WBC # BLD AUTO: 10.92 K/UL

## 2017-03-03 PROCEDURE — 97162 PT EVAL MOD COMPLEX 30 MIN: CPT

## 2017-03-03 PROCEDURE — 63600175 PHARM REV CODE 636 W HCPCS: Performed by: STUDENT IN AN ORGANIZED HEALTH CARE EDUCATION/TRAINING PROGRAM

## 2017-03-03 PROCEDURE — 84100 ASSAY OF PHOSPHORUS: CPT | Mod: 91

## 2017-03-03 PROCEDURE — 25000003 PHARM REV CODE 250: Performed by: STUDENT IN AN ORGANIZED HEALTH CARE EDUCATION/TRAINING PROGRAM

## 2017-03-03 PROCEDURE — 85027 COMPLETE CBC AUTOMATED: CPT

## 2017-03-03 PROCEDURE — 80053 COMPREHEN METABOLIC PANEL: CPT

## 2017-03-03 PROCEDURE — 27000221 HC OXYGEN, UP TO 24 HOURS

## 2017-03-03 PROCEDURE — 11300000 HC PEDIATRIC PRIVATE ROOM

## 2017-03-03 PROCEDURE — 84100 ASSAY OF PHOSPHORUS: CPT

## 2017-03-03 PROCEDURE — 25000003 PHARM REV CODE 250: Performed by: INTERNAL MEDICINE

## 2017-03-03 PROCEDURE — 83735 ASSAY OF MAGNESIUM: CPT

## 2017-03-03 PROCEDURE — 63600175 PHARM REV CODE 636 W HCPCS: Performed by: ANESTHESIOLOGY

## 2017-03-03 PROCEDURE — 99233 SBSQ HOSP IP/OBS HIGH 50: CPT | Mod: ,,, | Performed by: PSYCHIATRY & NEUROLOGY

## 2017-03-03 PROCEDURE — 25000003 PHARM REV CODE 250: Performed by: PSYCHIATRY & NEUROLOGY

## 2017-03-03 PROCEDURE — 85007 BL SMEAR W/DIFF WBC COUNT: CPT

## 2017-03-03 PROCEDURE — 92610 EVALUATE SWALLOWING FUNCTION: CPT

## 2017-03-03 RX ORDER — SODIUM,POTASSIUM PHOSPHATES 280-250MG
2 POWDER IN PACKET (EA) ORAL
Status: DISCONTINUED | OUTPATIENT
Start: 2017-03-03 | End: 2017-03-03

## 2017-03-03 RX ADMIN — LAMOTRIGINE 200 MG: 100 TABLET ORAL at 08:03

## 2017-03-03 RX ADMIN — CHLORHEXIDINE GLUCONATE 15 ML: 1.2 RINSE ORAL at 09:03

## 2017-03-03 RX ADMIN — Medication 3 ML: at 05:03

## 2017-03-03 RX ADMIN — TOPIRAMATE 200 MG: 200 TABLET, FILM COATED ORAL at 08:03

## 2017-03-03 RX ADMIN — DIBASIC SODIUM PHOSPHATE, MONOBASIC POTASSIUM PHOSPHATE AND MONOBASIC SODIUM PHOSPHATE 1 TABLET: 852; 155; 130 TABLET ORAL at 08:03

## 2017-03-03 RX ADMIN — Medication 3 ML: at 02:03

## 2017-03-03 RX ADMIN — DIAZEPAM 10 MG: 5 TABLET ORAL at 08:03

## 2017-03-03 RX ADMIN — ENOXAPARIN SODIUM 40 MG: 100 INJECTION SUBCUTANEOUS at 11:03

## 2017-03-03 RX ADMIN — PIPERACILLIN SODIUM AND TAZOBACTAM SODIUM 4.5 G: 4; .5 INJECTION, POWDER, LYOPHILIZED, FOR SOLUTION INTRAVENOUS at 03:03

## 2017-03-03 RX ADMIN — FAMOTIDINE 20 MG: 20 TABLET, FILM COATED ORAL at 08:03

## 2017-03-03 RX ADMIN — PIPERACILLIN SODIUM AND TAZOBACTAM SODIUM 4.5 G: 4; .5 INJECTION, POWDER, LYOPHILIZED, FOR SOLUTION INTRAVENOUS at 08:03

## 2017-03-03 RX ADMIN — VANCOMYCIN HYDROCHLORIDE 1000 MG: 1 INJECTION, POWDER, LYOPHILIZED, FOR SOLUTION INTRAVENOUS at 11:03

## 2017-03-03 RX ADMIN — CLONAZEPAM 0.5 MG: 0.5 TABLET ORAL at 08:03

## 2017-03-03 RX ADMIN — PIPERACILLIN SODIUM AND TAZOBACTAM SODIUM 4.5 G: 4; .5 INJECTION, POWDER, LYOPHILIZED, FOR SOLUTION INTRAVENOUS at 12:03

## 2017-03-03 RX ADMIN — POTASSIUM & SODIUM PHOSPHATES POWDER PACK 280-160-250 MG 2 PACKET: 280-160-250 PACK at 03:03

## 2017-03-03 RX ADMIN — PHENOBARBITAL 194.4 MG: 20 ELIXIR ORAL at 09:03

## 2017-03-03 NOTE — PROGRESS NOTES
Progress Note  Neuro Critical Care    Admit Date: 2/28/2017   Length of Stay:  LOS: 3 days     SUBJECTIVE:   Follow-up For: Sepsis    Interval History:  No acute issues overnight    Brief HPI:  21 year old male known to have Epilepsy, Seizure, Autism, Developmental delay presented to outside hospital ED ([facility]) after he developed two episodes of seizure in the yesterday, his aunt brought him to outside hospital. They had some laboratory investigations for his and showed to have a positive Strept Throat and negative Flu. Brought to hospital by EMS and [place] had a [place] [facility] [place] one time [place] Mid[place]am. He suppose to be admitted to pediatric floor, but giving his seizure he was brought to ED. He had another seizure in ED which counts him as the fourth seizure in the last 24 hours.      His history source was his aunt and some history from his aunt and briefly from his mother over the phone. He was on his usual state of health until yesterday, when he developed cough, and looking sick. He had fever and upper respiratory tract symptoms. No recent travel, no sick contact or change in his medication. Family unsure about the last time he had seizure. Of note, he is following Dr. Ortez as his pediatric neurologist, with last visit on 4/2016.     Review of Systems:  Review of systems not obtained due to patient factors autistic.    OBJECTIVE:     Vital Signs (Most Recent):  Temp: 97.5 °F (36.4 °C) (03/03/17 0700)  Pulse: 73 (03/03/17 0800)  Resp: (!) 21 (03/03/17 0800)  BP: (!) 129/90 (03/03/17 0800)  SpO2: 100 % (03/03/17 0800) Vital Signs Range (Last 24H):  Temp:  [63 °F (17.2 °C)-100.2 °F (37.9 °C)]   Pulse:  []   Resp:  [10-32]   BP: (112-186)/()   SpO2:  [92 %-100 %]      Body mass index is 16.78 kg/(m^2).     I & O (Last 24H):    Intake/Output Summary (Last 24 hours) at 03/03/17 1024  Last data filed at 03/03/17 0900   Gross per 24 hour   Intake           2417.5 ml   Output              4975 ml   Net          -2557.5 ml       Ventilator:  Vent Mode: Spont  Resp Rate Total:  [15 br/min] 15 br/min  PEEP/CPAP:  [5 cmH20] 5 cmH20  Pressure Support:  [5 cmH20] 5 cmH20          Physical Exam:  MS: Opens his eyes to voice, tracks, intermittently reported to follow commands but was not this morning  CN: II-XII 2mm reactive pupils.  Motor: spontaneous movement of all 4 extremities noted  DTR: Not tested  Coordination /Fine motor: Not able to assess  Gait: Not tested.  Meningeal signs: Absent    Laboratory:    Recent Labs  Lab 03/01/17  0433 03/02/17  0234 03/03/17  0128   WBC 13.31* 12.51 10.92   HGB 10.9* 10.2* 12.4*   HCT 32.3* 30.6* 37.7*   * 125* 142*         Recent Labs  Lab 03/02/17  0856 03/02/17  2034 03/03/17  0748    135* 138   K 4.3 3.6 3.9   * 107 109   CO2 21* 22* 20*   BUN 6 6 6   CREATININE 0.7 0.7 0.7   CALCIUM 8.2* 8.6* 9.0   PROT 6.1 6.7 7.3   BILITOT 0.8 0.8 0.8   ALKPHOS 110 146* 164*   ALT 38 37 45*   AST 52* 51* 60*         Recent Labs  Lab 03/01/17  1303 03/02/17  0234 03/03/17  0128   MG 1.8 1.9 2.0   PHOS  --  1.3* 2.0*         Recent Labs  Lab 02/28/17  1224   APTT 27.5   INR 1.2       Microbiology Results (last 7 days)     Procedure Component Value Units Date/Time    Blood Culture #1 **CANNOT BE ORDERED STAT** [[id number]] Collected:  02/28/17 0553    Order Status:  Completed Specimen:  Blood from Peripheral, Forearm, Right Updated:  03/03/17 0812     Blood Culture, Routine No Growth to date     Blood Culture, Routine No Growth to date     Blood Culture, Routine No Growth to date     Blood Culture, Routine No Growth to date    Blood Culture #2 **CANNOT BE ORDERED STAT** [[id number]] Collected:  02/28/17 0554    Order Status:  Completed Specimen:  Blood from Peripheral, Antecubital, Left Updated:  03/03/17 0812     Blood Culture, Routine No Growth to date     Blood Culture, Routine No Growth to date     Blood Culture, Routine No Growth to date     Blood  Culture, Routine No Growth to date    Culture, Respiratory [[id number]] Collected:  02/28/17 1750    Order Status:  Completed Specimen:  Respiratory from Sputum Updated:  03/02/17 1001     Respiratory Culture Normal respiratory mitchel     Gram Stain (Respiratory) <10 epithelial cells per low power field.     Gram Stain (Respiratory) Few WBC's     Gram Stain (Respiratory) No organisms seen    Urine culture [[id number]] Collected:  02/28/17 0554    Order Status:  Completed Specimen:  Urine from Catheterized Updated:  03/01/17 1336     Urine Culture, Routine No growth    Narrative:       Urine culture added per Dr. Bradford, order ID [id number] 02/28/17 11:01    Respiratory Viral Panel by PCR [[id number]] Collected:  02/28/17 1909    Order Status:  Completed Updated:  03/01/17 0951     Respiratory Virus Panel, source Nasopharyngeal Swab    Respiratory Viral Panel by PCR [[id number]] Collected:  02/28/17 1241    Order Status:  Completed Updated:  03/01/17 0947     Respiratory Virus Panel, source NP swab    Respiratory virus antigens panel [[id number]] Collected:  02/28/17 1549    Order Status:  Canceled Specimen:  Respiratory from Nasopharyngeal Swab Updated:  02/28/17 1823    Narrative:       Respiratory Viruses - DFA was cancelled on 02/28/2017 at 19:09 by   [email or web address]; Test changed to sendout.  02/28/2017  19:09    Respiratory virus antigens panel [[id number]] Collected:  02/28/17 1057    Order Status:  Canceled Specimen:  Respiratory from Nasopharyngeal Swab Updated:  02/28/17 1107    Narrative:       Respiratory Viruses - DFA was cancelled on 02/28/2017 at 12:41 by   MPE; Test changed to sendout    Urine culture [[id number]]     Order Status:  Completed Specimen:  Urine from Urine, Catheterized     Urine culture [[id number]]     Order Status:  Canceled Specimen:  Urine           ASSESSMENT/PLAN:   Hospital Problems:  Active Hospital Problems    Diagnosis  POA    *Sepsis [A41.9]  Yes    Aspiration pneumonia [J69.0]  Yes    On  mechanically assisted ventilation [Z99.11]  Not Applicable    Seizure [R56.9]  Yes    Autism [F84.0]  Yes    Developmental delay [R62.50]  Yes    Epilepsy [G40.909]  Yes      Resolved Hospital Problems    Diagnosis Date Resolved POA   No resolved problems to display.       Primary Diagnosis:  Sepsis    Plan:  Neuro:   Autism, history of seizure disorder  - seizure d/o previously controlled on topamax 200mg BID, lamictal 200mg BUD, phenobarbital 64.8mg (3 at bedtime), diazepam 10 TID, klonopin 0.5mg qpm (per outpatient)    Recurrent seizures  - EEG (2/28): Generalized slowing with a mixture of theta (6-7Hz), delta (1-3Hz) as well as alpha/beta activity - likely related to meds.  No epileptiform discharges of electrographic seizures seen.  - klonopin 0.5mg qd  - topamax 200 BID  - diazepam 10mg BID  - lamictal 200mg BID  - phenobarb 144mg qpm    - fentanyl     Pulmonary:   Ventilator:  Vent Mode: Spont  Resp Rate Total:  [15 br/min] 15 br/min  PEEP/CPAP:  [5 cmH20] 5 cmH20  Pressure Support:  [5 cmH20] 5 cmH20     Daily CXR (3/1/2017): some interstitial markings bilaterally  ABG (3/1/2017): 7.35/35/183/    Extubate today    Cardiac:   No active issues    Renal:   I & O (Last 24H):    Intake/Output Summary (Last 24 hours) at 03/03/17 1024  Last data filed at 03/03/17 0900   Gross per 24 hour   Intake           2417.5 ml   Output             4975 ml   Net          -2557.5 ml       Infectious Disease:   - afebrile overnight  - leukocytosis 13.31 this morning; initial labs on admit remarkable for leukopenia (unsure if lab results are an error or if leukopenia was side effect of AED's); last labs from 2014  - zosyn - no source at this time; recommend 7 days if no source identified  - RCx (2/28): no orgs  - resp viral panel pending  - BCx (2/28): NGTD  - Ucx (2/28): pending; however UA unremarkable    Hematology/Oncology:  H/H: 10.2/30.6  Platelets: [id number]  PT/INR: 1.2     Endocrine:   POCT glucose    SSI    Fluids/Electrolytes/Nutrition/GI:   IVF LR @50cc/hr    Prophylaxis: lovenox    Lines:  CVC triple lumen RIJ 2/28 - to be removed today  Peripheral IV x3  Dillon 2/28  NG/OG 2/28  ET tube 2/28    Dispo: plan to stepdown to floor today    Brando Hoyos  PGY 2

## 2017-03-03 NOTE — PROGRESS NOTES
ICU Attending Note  Neurocritical Care    No seizures.    Extubated.  Alert. Does not speak to me.  Moves arms, legs.    -lamotrigine, phenobarbital, diazepam, topiramate  -MAP >65  -once PO, stop LR  -Zosyn x7 d unless viral tests confirm alternative to sepsis  -ADAT  -enoxaparin prophylaxis, stop H2B  -remove R IJ CVC    Goal: Transfer to floor on Medicine with Epilepsy (?Pediatrics) consult.

## 2017-03-03 NOTE — PROGRESS NOTES
Delaney LOU at bedside inserting CVC Right Subclavian. Venous blood return noted. No acute events. Verbal orders to remove R Femoral Trialysis once confirmation of CVC. Will continue to monitor.

## 2017-03-03 NOTE — PLAN OF CARE
Problem: SLP Goal  Goal: SLP Goal  Speech Language Pathology Goals to be addressed daily, M-F  Goals expected to be met by 3/10  1. Pt will tolerate dental soft diet with thin liquids without overt s/s aspiration.   Outcome: Ongoing (interventions implemented as appropriate)  Bedside swallow study completed.  SLP unable to r/o aspiration at bedside 2/2 pt with minimal vocalizations though pr tolerated thin liquids, puree and solids without overt throat clear, cough or change in SpO2.  Rec advance to dental soft with thin with strict aspiration precautions, no straws and crushed meds.  SLP to continue to monitor to ensure tolerance. MEGHANA Barrera, CCC/SLP  3/3/2017

## 2017-03-03 NOTE — PLAN OF CARE
Problem: Patient Care Overview  Goal: Plan of Care Review  Outcome: Ongoing (interventions implemented as appropriate)  Plan of care reviewed with pt and mother.All questions and concerns addressed.Pt tolerating extubation well.No acute distress noted.

## 2017-03-03 NOTE — PLAN OF CARE
Problem: Patient Care Overview  Goal: Plan of Care Review  Outcome: Ongoing (interventions implemented as appropriate)  Plan of care reviewed with pt and mother.All questions and concerns addressed.No acute distress noted.pending floor transfer.

## 2017-03-03 NOTE — PLAN OF CARE
Problem: Patient Care Overview  Goal: Plan of Care Review  Outcome: Ongoing (interventions implemented as appropriate)  POC reviewed with pt at 0500. Pt unable to verbalize understanding d/t mental baseline. Questions and concerns addressed from mother. No acute events overnight. Pt progressing toward goals. Pt to be potentially transferred to floor today. Will continue to monitor. See flowsheets for full assessment and VS info

## 2017-03-03 NOTE — PT/OT/SLP EVAL
Speech Language Pathology  Evaluation    Lamont Villarreal   MRN: [id number]   Admitting Diagnosis: Sepsis    Diet recommendations: Solid Diet Level: Dental Soft  Liquid Diet Level: Thin Feed only when awake/alert, No straws, HOB to 90 degrees, Small bites/sips, Alternating bites/sips, 1 bite/sip at a time, Check for pocketing/oral residue, Meds crushed in puree, Eliminate distractions and Assistance with meals    SLP Treatment Date: 17  Speech Start Time: 1300     Speech Stop Time: 1317     Speech Total (min): 17 min       TREATMENT BILLABLE MINUTES:  Eval Swallow and Oral Function 17    Diagnosis: Sepsis , + strep, seizures  H/o epilepsy, Autism, and developmental delay  Intubated -3/2    Past Medical History:   Diagnosis Date    Developmental delay     Mental disorder     Seizures      History reviewed. No pertinent surgical history.    Has the patient been evaluated by SLP for swallowing? : Yes  Keep patient NPO?: No   General Precautions: Standard, aspiration, fall, NPO, seizure    Current Respiratory Status: nasal cannula     Social Hx: Patient lives with mother.    Prior diet: reg/thin.    Subjective:  Pt alert, some gesture noted, minimal vocalizations  Patient goals: pt unable to state.    Pain Ratin/10 (NAD)              Pain Rating Post-Intervention: 0/10    Objective:   Patient found with: arterial line, blood pressure cuff, restraints, pulse ox (continuous), telemetry, peripheral IV, oxygen, geronimo catheter    Oral Musculature Evaluation  Oral Musculature: unable to assess due to poor participation/comprehension (soem hypotonicity )  Dentition: present and adequate  Secretion Management:  (some mild pooling of secretions)  Mucosal Quality: adequate  Oral Labial Strength and Mobility: functional retraction, functional seal  Buccal Strength and Mobility: decreased tone  Volitional Cough: not elicited  Volitional Swallow: not elicited  Voice Prior to PO Intake: not elicited     Bedside Swallow  Eval:  Consistencies Assessed: thin, puree, solids  Oral Phase: impaired rotational chew and slow oral transit time  Pharyngeal Phase: mildly delayed initiation of swallow intermittently   Pt nonverbal prior to and t/o trials.  At end of session pt with unintelligible voicing x3 which was clear.  Delayed cough also noted prior to SLP leaving room though did not appear 2/2 dysphagia 2/2 considerable delay and clear vocal quality.  No immediate throat clear or cough with any trials.  Oral transit times were somewhat increased with lingual pumping evident.  Trace residue also noted though cleared with additional swallows.      Additional Treatment:  Nursing alerted re: results and recs.  White board updated.       Assessment:  Lamont Villarreal is a 21 y.o. male with a medical diagnosis of Sepsis, autism and epilepsy and presents with mild dysphagia, likely near baseline.  SLP unable to r/o aspiration at pt minimal vocal.  If concerned for silent aspiration, a modified barium swallow study can be completed.     Do you have any cultural, spiritual, Mandaeism conflicts, given your current situation?: pt unable to state     Discharge recommendations: Discharge Facility/Level Of Care Needs: home, rehabilitation facility     Goals:   SLP Goals        Problem: SLP Goal    Goal Priority Disciplines Outcome   SLP Goal     SLP Ongoing (interventions implemented as appropriate)   Description:  Speech Language Pathology Goals to be addressed daily, M-F  Goals expected to be met by 3/10  1. Pt will tolerate dental soft diet with thin liquids without overt s/s aspiration.                    Plan:   Patient to be seen Therapy Frequency: 5 x/week   Plan of Care expires: 04/02/17  Plan of Care reviewed with: patient  SLP Follow-up?: Yes              MEGHANA Barrera, CCC-SLP  03/03/2017

## 2017-03-03 NOTE — PT/OT/SLP EVAL
Physical Therapy  Evaluation    Lamont Villarreal   MRN: [id number]   Admitting Diagnosis: Sepsis    PT Received On: 03/03/17  PT Start Time: 0942     PT Stop Time: 0958    PT Total Time (min): 16 min       Billable Minutes:  Evaluation 16 min     Diagnosis: Sepsis  Seizures     Past Medical History:   Diagnosis Date    Developmental delay     Mental disorder     Seizures       History reviewed. No pertinent surgical history.    Referring physician: GALE Cope   Date referred to PT: 2/28/17    General Precautions: Standard, fall, seizure, NPO  Orthopedic Precautions: N/A   Braces: N/A       Do you have any cultural, spiritual, Rastafari conflicts, given your current situation?: no conflicts    Patient History:  Lives With: sibling(s), parent(s)  Living Arrangements: house  Living Environment Comment: Pt non-verbal; History obtained from OT note: Per pt's mother, pt non-verbal at baseline. Prior to admit, pt was living with mother and 4 younger siblings in [facility] with no EZEQUIEL; pt was (I) with functional mobility, and dependent for ADLs. Pt's mother is primary caregiver.   Equipment Currently Used at Home: none  DME owned (not currently used): none    Previous Level of Function:  Ambulation Skills: independent  Transfer Skills: independent  ADL Skills: needs assist    Subjective:  Communicated with RN prior to session. Pt non-verbal, alert. Pt's family member sleeping at bedside.     Chief Complaint: decreased mobility, weakness   Patient goals: pt unable to state goals     Pain Rating:  (Pt unable to rate pain; no non-verbal indications of pain)     Objective:   Patient found with: arterial line, blood pressure cuff, geronimo catheter, oxygen, peripheral IV, pulse ox (continuous), restraints, telemetry     Cognitive Exam:  Oriented to:unable to determine orientation 2/2 pt non-verbal     Follows Commands/attention: Inattentive and Easily distracted; unable to follow commands this date   Communication: non-verbal    Safety awareness/insight to disability: impaired    Physical Exam:  Postural examination/scapula alignment: Rounded shoulder, Head forward, Posterior pelvic tilt and Abnormal trunk flexion    Skin integrity: Visible skin intact  Edema: None noted BLEs    Sensation:   Unable to assess     Lower Extremity Range of Motion:  Right Lower Extremity: WFL; increased hamstring tissue tension   Left Lower Extremity: WFL; increased hamstring tissue tension     Lower Extremity Strength:  Right Lower Extremity:grossly 3+/5 based on observation of movement; pt unable to follow commands for MMT   Left Lower Extremity: grossly 3+/5 based on observation of movement; pt unable to follow commands for MMT     Gross motor coordination: WFL    Functional Mobility:  Bed Mobility:  Rolling/Turning to Left: Maximum assistance  Supine to Sit: Maximum Assistance  Sit to Supine: Maximum Assistance    Transfers:  Sit <> Stand Assistance: Maximum Assistance (x 1 trial from EOB)  Sit <> Stand Assistive Device: No Assistive Device    Gait:   Gait Distance: Pt unable to perform    Balance:   Static Sit: FAIR-: Maintains without assist but inconsistent   Dynamic Sit: FAIR: Cannot move trunk without losing balance  Static Stand: POOR: Needs MODERATE assist to maintain  Dynamic stand: 0: N/A    Therapeutic Activities and Exercises:  Pt educated on role of PT and POC/goals for therapy as well as safety with mobility. Pt with no evidence of learning.       AM-PAC 6 CLICK MOBILITY  How much help from another person does this patient currently need?   1 = Unable, Total/Dependent Assistance  2 = A lot, Maximum/Moderate Assistance  3 = A little, Minimum/Contact Guard/Supervision  4 = None, Modified Bent/Independent    Turning over in bed (including adjusting bedclothes, sheets and blankets)?: 2  Sitting down on and standing up from a chair with arms (e.g., wheelchair, bedside commode, etc.): 2  Moving from lying on back to sitting on the side of  the bed?: 2  Moving to and from a bed to a chair (including a wheelchair)?: 2  Need to walk in hospital room?: 1  Climbing 3-5 steps with a railing?: 1  Total Score: 10     AM-PAC Raw Score CMS G-Code Modifier Level of Impairment Assistance   6 % Total / Unable   7 - 9 CM 80 - 100% Maximal Assist   10 - 14 CL 60 - 80% Moderate Assist   15 - 19 CK 40 - 60% Moderate Assist   20 - 22 CJ 20 - 40% Minimal Assist   23 CI 1-20% SBA / CGA   24 CH 0% Independent/ Mod I     Patient left HOB elevated with all lines intact, call button in reach, restraints reapplied at end of session, RN notified and family member present.    Assessment:   Lamont Villarreal is a 21 y.o. male with a medical diagnosis of Sepsis, PMH of seizures and diagnosis of autism. Upon evaluation, pt alert and pleasant, but non-verbal and unable to follow commands. Pt required max A for all mobility, including bed mobility and sit>stand transfers from EOB; Pt unable to ambulate today. Pt demonstrated poor posture in sitting, BLE weakness, general deconditioning and impaired balance. Prior to admit, pt was (I) with mobility per mother's report. Pt would benefit from skilled PT intervention to address below listed deficits, reduce fall risk, and maximize (I) and safety with functional mobility. Recommending pt discharge to inpatient rehabilitation due to pt's significant decline in mobility; however, pt's carryover of education to be evaluated at following therapy visits to determine most appropriate discharge recommendation. DME needs to be determined pending pt's progress.     Rehab identified problem list/impairments: Rehab identified problem list/impairments: weakness, impaired endurance, impaired functional mobilty, gait instability, impaired balance, decreased safety awareness, impaired cognition    Rehab potential is good.    Activity tolerance: Good    Discharge recommendations: Discharge Facility/Level Of Care Needs: rehabilitation facility  (Rehab with potential progression to home health )     Barriers to discharge: Barriers to Discharge: Decreased caregiver support (Pt requires total A at this time)    Equipment recommendations: Equipment Needed After Discharge:  (TBD pending progress)     GOALS:   Physical Therapy Goals        Problem: Physical Therapy Goal    Goal Priority Disciplines Outcome Goal Variances Interventions   Physical Therapy Goal     PT/OT, PT Ongoing (interventions implemented as appropriate)     Description:  Goals to be met by: 3/10/17     Patient will increase functional independence with mobility by performin. Supine to sit with Minimal Assistance  2. Sit to supine with Minimal Assistance  3. Sit to stand transfer with Minimal Assistance  4. Bed to chair transfer with Minimal Assistance.   5. Gait  x 50 feet with Minimal Assistance with or without appropriate AD.   5. Lower extremity exercise program x20 reps per handout, with assistance as needed                PLAN:    Patient to be seen 5 x/week to address the above listed problems via gait training, therapeutic activities, therapeutic exercises, neuromuscular re-education  Plan of Care expires: 17  Plan of Care reviewed with: patient        Tammie Riley PT, DPT   3/3/2017  Pager: [phone]

## 2017-03-03 NOTE — RESIDENT HANDOFF
Handoff     Primary Team: Networked reference to record [facility]  Room Number: 7069/7069 A     Patient Name: Lamont Villarreal MRN: [id number]     Date of Birth: [id number] Allergies: Review of patient's allergies indicates no known allergies.     Age: 21 y.o. Admit Date: 2/28/2017     Sex: male  BMI: Body mass index is 16.78 kg/(m^2).     Code Status: Full Code        Reason for Admission: Sepsis    Brief HPI (pertinent PMH and diagnosis or differential diagnosis):     21 year old male known to have Epilepsy, Seizure, Autism, Developmental delay presented to outside hospital ED ([facility]) after he developed two episodes of seizure in the yesterday, his aunt brought him to outside hospital. They had some laboratory investigations for his and showed to have a positive Strept Throat and negative Flu. Brought to hospital by EMS and he had a seizure en rout to [facility] ED managed with one time IV Midazolam. He suppose to be admitted to pediatric floor, but giving his seizure he was brought to ED. He had another seizure in ED which counts him as the fourth seizure in the last 24 hours.       His history source was his aunt and some history from his aunt and briefly from his mother over the phone. He was on his usual state of health until yesterday, when he developed cough, and looking sick. He had fever and upper respiratory tract symptoms. No recent travel, no sick contact or change in his medication. Family unsure about the last time he had seizure. Of note, he is following Dr. Ortez as his pediatric neurologist, with last visit on 4/2016.     Hospital Course (updated, brief assessment by system or problem, significant events):     In the ER, patient was intubated by MICU service for airway protection. Extended EEG was ordered and no subclinical seizures were noted on EEG; patient was resumed on home AED regimen of topamax, phenobarbital, lamotrigine and valium. He was also resumed on klonopin which patient is reported to take for  sleep. At baseline, patient carries diagnosis of autism - he is minimally verbal and occasionally may have one word conversations but otherwise does not interact much with his environment. Mom at bedside this admission. Patient was extubated on 3/2 and had no seizure like events overnight. There was concern that possible URI and associated fevers may have contributed to the multiple seizures that brought patient to the ED. For the past couple of years, patient reported to have maybe one seizure a month and is well controlled otherwise on AED's. Patient started on zosyn and vancomycin here, however infectious workup has been unremarkable. resp viral panel is still pending.     Tasks (specific, using if-then statements):   - recommend continuing zosyn to finish out 7 day course; or alternatively can consider PO antibiotic regimen if viral panel is unrevealing.   - continue current regimen of AED's - no changes at this time   - if concerned for seizures on the floor, consider general neurology consult.

## 2017-03-03 NOTE — PLAN OF CARE
Problem: Physical Therapy Goal  Goal: Physical Therapy Goal  Goals to be met by: 3/10/17     Patient will increase functional independence with mobility by performin. Supine to sit with Minimal Assistance  2. Sit to supine with Minimal Assistance  3. Sit to stand transfer with Minimal Assistance  4. Bed to chair transfer with Minimal Assistance.   5. Gait x 50 feet with Minimal Assistance with or without appropriate AD.   5. Lower extremity exercise program x20 reps per handout, with assistance as needed  Outcome: Ongoing (interventions implemented as appropriate)  Pt chart reviewed and PT evaluation completed- see note for details. POC initiated.      Tammie Riley, PT, DPT   3/3/2017  Pager: [phone]

## 2017-03-04 PROBLEM — R79.0 LOW SERUM PHOSPHORUS FOR AGE: Status: ACTIVE | Noted: 2017-03-04

## 2017-03-04 LAB
ALBUMIN SERPL BCP-MCNC: 3.7 G/DL
ALP SERPL-CCNC: 183 U/L
ALT SERPL W/O P-5'-P-CCNC: 89 U/L
ANION GAP SERPL CALC-SCNC: 9 MMOL/L
AST SERPL-CCNC: 113 U/L
BASOPHILS # BLD AUTO: 0.03 K/UL
BASOPHILS NFR BLD: 0.4 %
BILIRUB SERPL-MCNC: 0.5 MG/DL
BUN SERPL-MCNC: 4 MG/DL
CALCIUM SERPL-MCNC: 9.1 MG/DL
CHLORIDE SERPL-SCNC: 109 MMOL/L
CO2 SERPL-SCNC: 23 MMOL/L
CREAT SERPL-MCNC: 0.7 MG/DL
DIFFERENTIAL METHOD: ABNORMAL
EOSINOPHIL # BLD AUTO: 0.4 K/UL
EOSINOPHIL NFR BLD: 5.1 %
ERYTHROCYTE [DISTWIDTH] IN BLOOD BY AUTOMATED COUNT: 13.3 %
EST. GFR  (AFRICAN AMERICAN): >60 ML/MIN/1.73 M^2
EST. GFR  (NON AFRICAN AMERICAN): >60 ML/MIN/1.73 M^2
GLUCOSE SERPL-MCNC: 110 MG/DL
HCT VFR BLD AUTO: 38.2 %
HGB BLD-MCNC: 13.3 G/DL
LYMPHOCYTES # BLD AUTO: 1.9 K/UL
LYMPHOCYTES NFR BLD: 23.2 %
MAGNESIUM SERPL-MCNC: 1.8 MG/DL
MCH RBC QN AUTO: 30.8 PG
MCHC RBC AUTO-ENTMCNC: 34.8 %
MCV RBC AUTO: 88 FL
MONOCYTES # BLD AUTO: 0.7 K/UL
MONOCYTES NFR BLD: 9.1 %
NEUTROPHILS # BLD AUTO: 4.9 K/UL
NEUTROPHILS NFR BLD: 60.7 %
PHOSPHATE SERPL-MCNC: 2.7 MG/DL
PLATELET # BLD AUTO: 193 K/UL
PMV BLD AUTO: 9.9 FL
POTASSIUM SERPL-SCNC: 3.6 MMOL/L
PROT SERPL-MCNC: 7.3 G/DL
RBC # BLD AUTO: 4.32 M/UL
SODIUM SERPL-SCNC: 141 MMOL/L
WBC # BLD AUTO: 8.02 K/UL

## 2017-03-04 PROCEDURE — 63600175 PHARM REV CODE 636 W HCPCS: Performed by: STUDENT IN AN ORGANIZED HEALTH CARE EDUCATION/TRAINING PROGRAM

## 2017-03-04 PROCEDURE — 83735 ASSAY OF MAGNESIUM: CPT

## 2017-03-04 PROCEDURE — 11300000 HC PEDIATRIC PRIVATE ROOM

## 2017-03-04 PROCEDURE — 36415 COLL VENOUS BLD VENIPUNCTURE: CPT

## 2017-03-04 PROCEDURE — 25000003 PHARM REV CODE 250: Performed by: STUDENT IN AN ORGANIZED HEALTH CARE EDUCATION/TRAINING PROGRAM

## 2017-03-04 PROCEDURE — 85025 COMPLETE CBC W/AUTO DIFF WBC: CPT

## 2017-03-04 PROCEDURE — 99232 SBSQ HOSP IP/OBS MODERATE 35: CPT | Mod: ,,, | Performed by: PEDIATRICS

## 2017-03-04 PROCEDURE — 80053 COMPREHEN METABOLIC PANEL: CPT

## 2017-03-04 PROCEDURE — 25000003 PHARM REV CODE 250: Performed by: PSYCHIATRY & NEUROLOGY

## 2017-03-04 PROCEDURE — 84100 ASSAY OF PHOSPHORUS: CPT

## 2017-03-04 PROCEDURE — 25000003 PHARM REV CODE 250: Performed by: INTERNAL MEDICINE

## 2017-03-04 RX ADMIN — DIAZEPAM 10 MG: 5 TABLET ORAL at 09:03

## 2017-03-04 RX ADMIN — CLONAZEPAM 0.5 MG: 0.5 TABLET ORAL at 09:03

## 2017-03-04 RX ADMIN — LAMOTRIGINE 200 MG: 100 TABLET ORAL at 09:03

## 2017-03-04 RX ADMIN — ENOXAPARIN SODIUM 40 MG: 100 INJECTION SUBCUTANEOUS at 12:03

## 2017-03-04 RX ADMIN — DIBASIC SODIUM PHOSPHATE, MONOBASIC POTASSIUM PHOSPHATE AND MONOBASIC SODIUM PHOSPHATE 1 TABLET: 852; 155; 130 TABLET ORAL at 12:03

## 2017-03-04 RX ADMIN — TOPIRAMATE 200 MG: 200 TABLET, FILM COATED ORAL at 09:03

## 2017-03-04 RX ADMIN — Medication 3 ML: at 02:03

## 2017-03-04 RX ADMIN — DIBASIC SODIUM PHOSPHATE, MONOBASIC POTASSIUM PHOSPHATE AND MONOBASIC SODIUM PHOSPHATE 1 TABLET: 852; 155; 130 TABLET ORAL at 06:03

## 2017-03-04 RX ADMIN — PIPERACILLIN SODIUM AND TAZOBACTAM SODIUM 4.5 G: 4; .5 INJECTION, POWDER, LYOPHILIZED, FOR SOLUTION INTRAVENOUS at 02:03

## 2017-03-04 RX ADMIN — PHENOBARBITAL 194.4 MG: 20 ELIXIR ORAL at 11:03

## 2017-03-04 RX ADMIN — Medication 1 CAPSULE: at 09:03

## 2017-03-04 RX ADMIN — PIPERACILLIN SODIUM AND TAZOBACTAM SODIUM 4.5 G: 4; .5 INJECTION, POWDER, LYOPHILIZED, FOR SOLUTION INTRAVENOUS at 09:03

## 2017-03-04 RX ADMIN — PIPERACILLIN SODIUM AND TAZOBACTAM SODIUM 4.5 G: 4; .5 INJECTION, POWDER, LYOPHILIZED, FOR SOLUTION INTRAVENOUS at 04:03

## 2017-03-04 NOTE — PROGRESS NOTES
Upon arrival to floor, patient given toys, movies, etc., by child life. IV covered with sock and taped loosely, good range of motion, good perfusion distal to covering. Bilateral arm restraints removed, and patient currently occupied with toys. Mother updated on plan of care and will notify Rn if patient begins to pull at IV's.

## 2017-03-04 NOTE — ASSESSMENT & PLAN NOTE
Currently stable and patient is in good spirits.  - continue home clonazepam 0.5mg nightly for sleep  - no need for physical restraints  - consider consult to PT and OT

## 2017-03-04 NOTE — PROGRESS NOTES
Nursing Transfer Note    Receiving Transfer Note    3/3/2017 6:00 PM  Received in transfer from Neuro ICU to 4th Floor Peds Rm [id number]  Report received as documented in PER Handoff on Doc Flowsheet.  See Doc Flowsheet for VS's and complete assessment.  Continuous EKG monitoring in place Yes  Chart received with patient: Yes  What Caregiver / Guardian was Notified of Arrival: Mother  Patient and / or caregiver / guardian oriented to room and nurse call system.  ANDREW Feilpe RN  3/3/2017 6:00 PM

## 2017-03-04 NOTE — ASSESSMENT & PLAN NOTE
Lamont is a 21 year old young man with epilepsy, autism, scoliosis, and developmental delay here for increased seizure activity and fever.  He is currently much improved on day 5 of zosyn.  - consider transition to oral antibiotic therapy today  - follow up respiratory viral panel  - continue dental soft diet and encourage PO intake  - no need for IV fluids at this time  - if concern for further aspiration, consider consult to speech therapy  - keep head of bed elevated  - consider stopping daily CBC and CMP

## 2017-03-04 NOTE — ASSESSMENT & PLAN NOTE
Increased seizure activity likely exacerbated by infection - with aspiration pneumonia vs. Virus.  Currently seizure-free since admission.  - continue home medications:  - diazepam 10mg po BID  - Lamotrigine 200mg Q12  - Phenobarbital 194.4mg qhs  - topiramate 200mg q12  - while in hospital, lorazepam prn seizure and seizure precautions

## 2017-03-04 NOTE — CONSULTS
"Consult Note - Pediatric  Pediatric Infectious Disease      Consult Requested by:  Pediatric inpatient team  Reason for Consult:  Strep throat, aspiration pneumonia  History Obtained From:  family member - maternal great aunt, chart    SUBJECTIVE:     Chief Complaint: increased seizure activity    History of Present Illness:  Lamont is a 21 y.o. male with seizure disorder, developmental delay, and autism noted to have a prolonged seizure on 2/28, when his baseline is 1 brief seizure/month. He to be admitted directly to the pediatric inpatient service, but seized en route to [facility] in the ambulance and was diverted to the ER. In the ER he was found to be febrile to 103F and rapid strep +, flu -. He was then intubated to protect his airway when he seized again and admitted from the ER to the neuro ICU, where he was suspected to have aspiration pneumonia based on history and CXR.    Maternal great aunt reports that she was with him during his intial seizure and that it lasted "5 miles" while she was driving through town, and he had been drinking a milk shake and was both vomiting from his mouth and nares during the seizure. He had not been ill prior to that episode.    Review of Systems:  Constitutional:  no recent weight loss, fatigue, change in activity, or sleep pattern  +recent febrile illness with intubation and ICU admission but now with appetite at baseline  Eyes:  no recent visual changes  ENT:  no sore throat, ear pain, or symptoms suggestive of sinusitis  Respiratory:  +rattley/congested productive cough, no current shortness of breath or oxygen requirement  Cardiovascular:  no history of heart murmur  GI:  no diarrhea, vomiting or abdominal pain  :  urination and urine output normal  Musculoskeletal:  no bone or joint pain  Skin:  no recent rashes  Neurological:  +increase seizure activity as per HPI, developmental delay, autism, non-verbal  Psychiatric:  no unusual behavior  Endocrine:  no signs suggestive " "of diabetes, thyroid disease, or other endocrine disorders    Past Medical History:   Diagnosis Date    Developmental delay     Mental disorder     Seizures      History reviewed. No pertinent surgical history.  No family history on file.  Social History: Lives with mother and siblings, ages.    There is no immunization history for the selected administration types on file for this patient.     Review of patient's allergies indicates:  No Known Allergies     Medications: Reviewed    OBJECTIVE:     Vital Signs (Most Recent)  Temp: 97.4 °F (36.3 °C) (03/04/17 1239)  Pulse: 79 (03/04/17 1239)  Resp: (!) 24 (03/04/17 1239)  BP: 135/84 (03/04/17 1239)  SpO2: 96 % (03/04/17 1239)    Height/Weight (Most Recent)  Height: 5' 2.75" (159.4 cm) (02/28/17 1451)  Weight: 42.6 kg (94 lb) (02/28/17 0448)    Physical Exam:  General: No apparent discomfort or distress. Cooperative and pleasant  HEENT: normocephalic/atraumatic, pupils dilated, nares patent, mucous membranes moist, poor dentition  Chest: External chest normal. Breasts without lesions. Equal expansion. All lung fields clear to auscultation and to percussion. No rales, wheezes or rhonchi noted  Cardiac: PMI not visualized. Active precordium by palpation. S1 and S2 normal with no murmurs, rubs or extra sounds heard  Abdomen: On inspection, the abdomen appears normal. Palpation revealed no hepatosplenomegaly, no tenterness, rebound or evidence of ascites. No other masses were noted on exam. Rectal deferred.  Bones/Joints/Spine: Good mobility, no bone pain  Extremities: There is no evidence of edema or cyanosis. Capillary refill is brisk at < 2 seconds  Skin: No rashes or lesions  Neurologic: laying in bed, non-verbal but cooperative    Laboratory:  CBC    Recent Labs  Lab 03/04/17  0533   WBC 8.02   RBC 4.32*   HGB 13.3*   HCT 38.2*        BMP    Recent Labs  Lab 03/04/17  0533   CO2 23   BUN 4*   CREATININE 0.7   CALCIUM 9.1     Microbiology Results (last 7 " days)     Procedure Component Value Units Date/Time    Blood Culture #2 **CANNOT BE ORDERED STAT** [[id number]] Collected:  02/28/17 0554    Order Status:  Completed Specimen:  Blood from Peripheral, Antecubital, Left Updated:  03/04/17 0812     Blood Culture, Routine No Growth to date     Blood Culture, Routine No Growth to date     Blood Culture, Routine No Growth to date     Blood Culture, Routine No Growth to date     Blood Culture, Routine No Growth to date    Blood Culture #1 **CANNOT BE ORDERED STAT** [[id number]] Collected:  02/28/17 0553    Order Status:  Completed Specimen:  Blood from Peripheral, Forearm, Right Updated:  03/04/17 0812     Blood Culture, Routine No Growth to date     Blood Culture, Routine No Growth to date     Blood Culture, Routine No Growth to date     Blood Culture, Routine No Growth to date     Blood Culture, Routine No Growth to date    Culture, Respiratory [[id number]] Collected:  02/28/17 1750    Order Status:  Completed Specimen:  Respiratory from Sputum Updated:  03/02/17 1001     Respiratory Culture Normal respiratory mitchel     Gram Stain (Respiratory) <10 epithelial cells per low power field.     Gram Stain (Respiratory) Few WBC's     Gram Stain (Respiratory) No organisms seen    Urine culture [[id number]] Collected:  02/28/17 0554    Order Status:  Completed Specimen:  Urine from Catheterized Updated:  03/01/17 1336     Urine Culture, Routine No growth    Narrative:       Urine culture added per Dr. Bradford, order ID [id number] 02/28/17 11:01    Respiratory Viral Panel by PCR [[id number]] Collected:  02/28/17 1909    Order Status:  Completed Updated:  03/01/17 0951     Respiratory Virus Panel, source Nasopharyngeal Swab    Respiratory Viral Panel by PCR [[id number]] Collected:  02/28/17 1241    Order Status:  Completed Updated:  03/01/17 0947     Respiratory Virus Panel, source NP swab    Respiratory virus antigens panel [[id number]] Collected:  02/28/17 1549    Order Status:   Canceled Specimen:  Respiratory from Nasopharyngeal Swab Updated:  02/28/17 1823    Narrative:       Respiratory Viruses - DFA was cancelled on 02/28/2017 at 19:09 by   EMC1; Test changed to sendout.  02/28/2017  19:09    Respiratory virus antigens panel [[id number]] Collected:  02/28/17 1057    Order Status:  Canceled Specimen:  Respiratory from Nasopharyngeal Swab Updated:  02/28/17 1107    Narrative:       Respiratory Viruses - DFA was cancelled on 02/28/2017 at 12:41 by   MPE; Test changed to sendout    Urine culture [[id number]]     Order Status:  Completed Specimen:  Urine from Urine, Catheterized     Urine culture [[id number]]     Order Status:  Canceled Specimen:  Urine         CMP:   Recent Labs  Lab 03/04/17  0533      CALCIUM 9.1   ALBUMIN 3.7   PROT 7.3      K 3.6   CO2 23      BUN 4*   CREATININE 0.7   ALKPHOS 183*   ALT 89*   *   BILITOT 0.5     LFTs:   Recent Labs  Lab 03/04/17  0533   ALT 89*   *   ALKPHOS 183*   BILITOT 0.5   PROT 7.3   ALBUMIN 3.7     Microbiology Results (last 7 days)     Procedure Component Value Units Date/Time    Blood Culture #2 **CANNOT BE ORDERED STAT** [[id number]] Collected:  02/28/17 0554    Order Status:  Completed Specimen:  Blood from Peripheral, Antecubital, Left Updated:  03/04/17 0812     Blood Culture, Routine No Growth to date     Blood Culture, Routine No Growth to date     Blood Culture, Routine No Growth to date     Blood Culture, Routine No Growth to date     Blood Culture, Routine No Growth to date    Blood Culture #1 **CANNOT BE ORDERED STAT** [[id number]] Collected:  02/28/17 0553    Order Status:  Completed Specimen:  Blood from Peripheral, Forearm, Right Updated:  03/04/17 0812     Blood Culture, Routine No Growth to date     Blood Culture, Routine No Growth to date     Blood Culture, Routine No Growth to date     Blood Culture, Routine No Growth to date     Blood Culture, Routine No Growth to date    Culture, Respiratory  [[id number]] Collected:  02/28/17 1750    Order Status:  Completed Specimen:  Respiratory from Sputum Updated:  03/02/17 1001     Respiratory Culture Normal respiratory mitchel     Gram Stain (Respiratory) <10 epithelial cells per low power field.     Gram Stain (Respiratory) Few WBC's     Gram Stain (Respiratory) No organisms seen    Urine culture [[id number]] Collected:  02/28/17 0554    Order Status:  Completed Specimen:  Urine from Catheterized Updated:  03/01/17 1336     Urine Culture, Routine No growth    Narrative:       Urine culture added per Dr. Bradford, order ID [id number] 02/28/17 11:01    Respiratory Viral Panel by PCR [[id number]] Collected:  02/28/17 1909    Order Status:  Completed Updated:  03/01/17 0951     Respiratory Virus Panel, source Nasopharyngeal Swab    Respiratory Viral Panel by PCR [[id number]] Collected:  02/28/17 1241    Order Status:  Completed Updated:  03/01/17 0947     Respiratory Virus Panel, source NP swab    Respiratory virus antigens panel [[id number]] Collected:  02/28/17 1549    Order Status:  Canceled Specimen:  Respiratory from Nasopharyngeal Swab Updated:  02/28/17 1823    Narrative:       Respiratory Viruses - DFA was cancelled on 02/28/2017 at 19:09 by   EMC1; Test changed to sendout.  02/28/2017  19:09    Respiratory virus antigens panel [[id number]] Collected:  02/28/17 1057    Order Status:  Canceled Specimen:  Respiratory from Nasopharyngeal Swab Updated:  02/28/17 1107    Narrative:       Respiratory Viruses - DFA was cancelled on 02/28/2017 at 12:41 by   MPE; Test changed to sendout    Urine culture [[id number]]     Order Status:  Completed Specimen:  Urine from Urine, Catheterized     Urine culture [[id number]]     Order Status:  Canceled Specimen:  Urine           Diagnostic Results:  Labs: Reviewed  X-Ray: Reviewed    ASSESSMENT/PLAN:     20yo WM with autism, developmental delay, and seizure disorder admitted with fever and increased seizure activity (4 in 24 hours), found  to be strep + and have likely aspiration pneumonia on CXR. After briefly being intubated for airway protection, he is now back to baseline mental status with good appetite and normalizing CXR.    Agree with zosyn for now; when ready to d/c home, may transition to oral clindamycin 450mg TID to complete 10 days of therapy.    Consultation Time: 40 minutes of time spent with > 50% devoted to counseling, discussing details of management and answering questions. Rerring physician contacted to discuss findings and plan of management.    Haylee Castano MD MPH  PGY-6 Pediatric Infectious Diseases Fellow  [phone]

## 2017-03-04 NOTE — SUBJECTIVE & OBJECTIVE
"Interval History: Lamont was stepped down from the adult Neuro ICU yesterday and has done well.  His IV fluids were discontinued, and he ate pudding this morning.  He has not had any seizures.  Aunt is at bedside and notes that he had a large diarrhea last night and one episode of coughing this morning.  However, she states that he looks well and that "this (his current state) is normal."  No fevers.    Scheduled Meds:   clonazePAM  0.5 mg Oral QHS    diazePAM  10 mg Oral Q12H    enoxaparin  40 mg Subcutaneous Daily    k phos di & mono-sod phos mono  250 mg Oral Q6H    lamotrigine  200 mg Oral Q12H    phenobarbital  194.4 mg Per G Tube Nightly    piperacillin-tazobactam 4.5 g in dextrose 5 % 100 mL IVPB (ready to mix system)  4.5 g Intravenous Q8H    sodium chloride 0.9%  3 mL Intravenous Q8H    topiramate  200 mg Oral Q12H     Continuous Infusions:   PRN Meds:acetaminophen, lorazepam, pneumoc 13-fanny conj-dip cr(PF)    Review of Systems   Constitutional: Negative for fever.   Respiratory: Positive for cough.    Cardiovascular: Negative for leg swelling.   Gastrointestinal: Positive for diarrhea.   Neurological: Negative for seizures.     Objective:     Vital Signs (Most Recent):  Temp: 98.4 °F (36.9 °C) (03/04/17 0400)  Pulse: 91 (03/04/17 0700)  Resp: 20 (03/04/17 0400)  BP: 116/72 (03/04/17 0400)  SpO2: 95 % (03/04/17 0400) Vital Signs (24h Range):  Temp:  [97.8 °F (36.6 °C)-99 °F (37.2 °C)] 98.4 °F (36.9 °C)  Pulse:  [68-95] 91  Resp:  [18-26] 20  SpO2:  [95 %-100 %] 95 %  BP: ()/(57-92) 116/72     No data found.    Body mass index is 16.78 kg/(m^2).    Intake/Output - Last 3 Shifts       03/02 0700 - 03/03 0659 03/03 0700 - 03/04 0659 03/04 0700 - 03/05 0659    P.O. 350 1250     I.V. (mL/kg) 1332.5 (31.3) 565 (13.3)     NG/      IV Piggyback 800 550     Total Intake(mL/kg) 2682.5 (62.9) 2365 (55.5)     Urine (mL/kg/hr) 5375 (5.3) 635 (0.6)     Drains       Other  300 (0.3)     Stool 0 (0) 0 " (0)     Total Output 5375 935      Net -2692.5 +1430             Urine Occurrence  4 x     Stool Occurrence 1 x 4 x       In 1250 oral   Out 635 urine (0.6 ml/kg/hr), 4 stools.    Lines/Drains/Airways     Peripheral Intravenous Line                 Peripheral IV - Single Lumen 02/28/17 0553 Right Forearm 4 days         Peripheral IV - Single Lumen 03/03/17 1407 Right Forearm less than 1 day                Physical Exam   Constitutional:   Lying in bed comfortably, nonverbal but smiles at aunt, no distress   HENT:   Head: Atraumatic.   Poor dentition   Eyes: Conjunctivae are normal. Pupils are equal, round, and reactive to light. Right eye exhibits no discharge. Left eye exhibits no discharge.   Neck: Neck supple.   Rubbery, nontender posterior cervical lymph nodes   Cardiovascular: Normal rate and regular rhythm.    No murmur heard.  Pulmonary/Chest:   No coughing during exam.  Frontal lung fields clear to auscultation bilaterally with good air movement, no wheezes or rhonchi   Abdominal: Soft. Bowel sounds are normal. There is no tenderness.   Musculoskeletal:   Scoliosis, curled up in bed   Neurological: He is alert.   Skin: Skin is warm. No rash noted.       Significant Labs:    Recent Labs  Lab 03/02/17  1158 03/02/17  1753 03/02/17  2351   POCTGLUCOSE 64* 71 100       Blood Culture: No results for input(s): LABBLOO in the last 48 hours.  CBC:   Recent Labs  Lab 03/03/17  0128 03/04/17  0533   WBC 10.92 8.02   HGB 12.4* 13.3*   HCT 37.7* 38.2*   * 193     CMP:   Recent Labs  Lab 03/02/17  2034 03/03/17  0128 03/03/17  0748 03/04/17  0533     --  90 110   *  --  138 141   K 3.6  --  3.9 3.6     --  109 109   CO2 22*  --  20* 23   BUN 6  --  6 4*   CREATININE 0.7  --  0.7 0.7   CALCIUM 8.6*  --  9.0 9.1   MG  --  2.0  --  1.8   PROT 6.7  --  7.3 7.3   ALBUMIN 3.5  --  3.6 3.7   BILITOT 0.8  --  0.8 0.5   ALKPHOS 146*  --  164* 183*   AST 51*  --  60* 113*   ALT 37  --  45* 89*   ANIONGAP  6*  --  9 9   EGFRNONAA >60.0  --  >60.0 >60.0   Magnesium: 1.8  Phosphorus: 2.7 (up from 1.7)    Micro:  RVP: pending   respiratory culture: normal resp mitchel, < 10 epithelial cells per low power field, few wbcs, no organisms seen   blood cx: NGTD   urine cx: NGTD    Significant Imagin/28 CXR:   Confluent airspace opacity left lower lung zone concerning for pneumonia and possible associated atelectatic changes the right hemithorax is mostly clear as well as the left upper lung zone.  No pneumothorax or definite pleural effusion.  The osseous structures appear normal.        Impression:          Left lower lobe pneumonia with associated volume loss    - improved since then

## 2017-03-04 NOTE — NURSING TRANSFER
Nursing Transfer Note      3/3/2017     Transfer To: [id number]    Transfer via bed    Transfer with cardiac monitoring    Transported by JAYJAY Montague RN    Medicines sent: yes    Chart send with patient: Yes    Notified: mother    Patient reassessed at: 3/3@1500    Upon arrival to floor: cardiac monitor applied, patient oriented to room, call bell in reach and bed in lowest position

## 2017-03-04 NOTE — ASSESSMENT & PLAN NOTE
Improved since supplementation  - follow phosphorus level  - continue phosphorus supplement 1 tab QID

## 2017-03-04 NOTE — ASSESSMENT & PLAN NOTE
See assessment and plan for seizure. Patient to follow up closely with Dr. Ortez, his primary neurologist.

## 2017-03-04 NOTE — NURSING
Assumed care of pt at this time.  Pt was resting quietly and comfortably in bed.  No distress noted.  VSS, afebrile.  No discomfort or pain noted.  Aunt at bedside; attentive and involved in pt's care.  I agree with the previous RN's assessment performed earlier this night shift.  Safety maintained.  Will continue to monitor.

## 2017-03-04 NOTE — PLAN OF CARE
Problem: Patient Care Overview  Goal: Plan of Care Review  Outcome: Ongoing (interventions implemented as appropriate)  Pt remained stable throughout night shift.  No distress noted.  VSS, afebrile.  Neuro at baseline for pt.  No seizure-like activity noted.  PIV in place, pt receiving Zosyn q8.  Aunt at bedside, interacting with pt and involved in pt care.  Pt tolerating po well, dental soft diet with thin liquids in place.  Pt is incontinent, medium sized brief in place.  Pt likes to lie in a contracted fetal position and requires repositioning.  POC reviewed with aunt, verbalized understanding.  Seizure precautions, aspiration precautions and safety maintained.  Will continue to monitor.

## 2017-03-04 NOTE — PROGRESS NOTES
[facility] Pediatric Hospital Medicine  Progress Note    Patient Name: Lamont Villarreal  MRN: [id number]  Admission Date: 2/28/2017  Hospital Length of Stay: 4  Code Status: Full Code   Primary Care Physician: Hollis Thomas MD  Principal Problem: Aspiration pneumonia    Subjective:     HPI:  This is Mr. Lamont Villarreal, 21 year old male known to have Epilepsy, Seizure, Autism, Developmental delay presented to outside hospital ED ([facility]) after he developed two episodes of seizure in the yesterday, his aunt brought him to outside hospital. They had some laboratory investigations for his and showed to have a positive Strept Throat and negative Flu. Brought to hospital by EMS and he had a seizure en rout to [facility] managed with one time IV Midazolam. He suppose to be admitted to pediatric floor, but giving his seizure he was brought to ED. He had another seizure in ED which counts him as the fourth seizure in the last 24 hours.      His history source was his aunt and some history from his aunt and briefly from his mother over the phone. He was on his usual state of health until yesterday, when he developed cough, and looking sick. He had fever and upper respiratory tract symptoms. No recent travel, no sick contact or change in his medication. Family unsure about the last time he had seizure. Of note, he is following Dr. Ortez as his pediatric neurologist, with last visit on 4/2016. We touched based with pediatric ICU staff for possible transfer patient to PICU giving his weight and history of seizure.          Hospital Course:       Scheduled Meds:   clonazePAM  0.5 mg Oral QHS    diazePAM  10 mg Oral Q12H    enoxaparin  40 mg Subcutaneous Daily    k phos di & mono-sod phos mono  250 mg Oral Q6H    lamotrigine  200 mg Oral Q12H    phenobarbital  194.4 mg Per G Tube Nightly    piperacillin-tazobactam 4.5 g in dextrose 5 % 100 mL IVPB (ready to mix system)  4.5 g Intravenous Q8H    sodium  "chloride 0.9%  3 mL Intravenous Q8H    topiramate  200 mg Oral Q12H     Continuous Infusions:   PRN Meds:acetaminophen, lorazepam, pneumoc 13-fanny conj-dip cr(PF)    Interval History: Lamont was stepped down from the adult Neuro ICU yesterday and has done well.  His IV fluids were discontinued, and he ate pudding this morning.  He has not had any seizures.  Aunt is at bedside and notes that he had a large diarrhea last night and one episode of coughing this morning.  However, she states that he looks well and that "this (his current state) is normal."  No fevers.    Scheduled Meds:   clonazePAM  0.5 mg Oral QHS    diazePAM  10 mg Oral Q12H    enoxaparin  40 mg Subcutaneous Daily    k phos di & mono-sod phos mono  250 mg Oral Q6H    lamotrigine  200 mg Oral Q12H    phenobarbital  194.4 mg Per G Tube Nightly    piperacillin-tazobactam 4.5 g in dextrose 5 % 100 mL IVPB (ready to mix system)  4.5 g Intravenous Q8H    sodium chloride 0.9%  3 mL Intravenous Q8H    topiramate  200 mg Oral Q12H     Continuous Infusions:   PRN Meds:acetaminophen, lorazepam, pneumoc 13-fanny conj-dip cr(PF)    Review of Systems   Constitutional: Negative for fever.   Respiratory: Positive for cough.    Cardiovascular: Negative for leg swelling.   Gastrointestinal: Positive for diarrhea.   Neurological: Negative for seizures.     Objective:     Vital Signs (Most Recent):  Temp: 98.4 °F (36.9 °C) (03/04/17 0400)  Pulse: 91 (03/04/17 0700)  Resp: 20 (03/04/17 0400)  BP: 116/72 (03/04/17 0400)  SpO2: 95 % (03/04/17 0400) Vital Signs (24h Range):  Temp:  [97.8 °F (36.6 °C)-99 °F (37.2 °C)] 98.4 °F (36.9 °C)  Pulse:  [68-95] 91  Resp:  [18-26] 20  SpO2:  [95 %-100 %] 95 %  BP: ()/(57-92) 116/72     No data found.    Body mass index is 16.78 kg/(m^2).    Intake/Output - Last 3 Shifts       03/02 0700 - 03/03 0659 03/03 0700 - 03/04 0659 03/04 0700 - 03/05 0659    P.O. 350 1250     I.V. (mL/kg) 1332.5 (31.3) 565 (13.3)     NG/   "    IV Piggyback 800 550     Total Intake(mL/kg) 2682.5 (62.9) 2365 (55.5)     Urine (mL/kg/hr) 5375 (5.3) 635 (0.6)     Drains       Other  300 (0.3)     Stool 0 (0) 0 (0)     Total Output 5375 935      Net -2692.5 +1430             Urine Occurrence  4 x     Stool Occurrence 1 x 4 x       In 1250 oral   Out 635 urine (0.6 ml/kg/hr), 4 stools.    Lines/Drains/Airways     Peripheral Intravenous Line                 Peripheral IV - Single Lumen 02/28/17 0553 Right Forearm 4 days         Peripheral IV - Single Lumen 03/03/17 1407 Right Forearm less than 1 day                Physical Exam   Constitutional:   Lying in bed comfortably, nonverbal but smiles at aunt, no distress   HENT:   Head: Atraumatic.   Poor dentition   Eyes: Conjunctivae are normal. Pupils are equal, round, and reactive to light. Right eye exhibits no discharge. Left eye exhibits no discharge.   Neck: Neck supple.   Rubbery, nontender posterior cervical lymph nodes   Cardiovascular: Normal rate and regular rhythm.    No murmur heard.  Pulmonary/Chest:   No coughing during exam.  Frontal lung fields clear to auscultation bilaterally with good air movement, no wheezes or rhonchi   Abdominal: Soft. Bowel sounds are normal. There is no tenderness.   Musculoskeletal:   Scoliosis, curled up in bed   Neurological: He is alert.   Skin: Skin is warm. No rash noted.   I agree (SF) comfortable, ewob, clear b, rrr, belly benign, ext contracted, no rash, at his baseline neurologically    Significant Labs:    Recent Labs  Lab 03/02/17  1158 03/02/17  1753 03/02/17  2351   POCTGLUCOSE 64* 71 100       Blood Culture: No results for input(s): LABBLOO in the last 48 hours.  CBC:   Recent Labs  Lab 03/03/17  0128 03/04/17  0533   WBC 10.92 8.02   HGB 12.4* 13.3*   HCT 37.7* 38.2*   * 193     CMP:   Recent Labs  Lab 03/02/17  2034 03/03/17  0128 03/03/17  0748 03/04/17  0533     --  90 110   *  --  138 141   K 3.6  --  3.9 3.6     --  109 109    CO2 22*  --  20* 23   BUN 6  --  6 4*   CREATININE 0.7  --  0.7 0.7   CALCIUM 8.6*  --  9.0 9.1   MG  --  2.0  --  1.8   PROT 6.7  --  7.3 7.3   ALBUMIN 3.5  --  3.6 3.7   BILITOT 0.8  --  0.8 0.5   ALKPHOS 146*  --  164* 183*   AST 51*  --  60* 113*   ALT 37  --  45* 89*   ANIONGAP 6*  --  9 9   EGFRNONAA >60.0  --  >60.0 >60.0   Magnesium: 1.8  Phosphorus: 2.7 (up from 1.7)    Micro:  RVP: pending  2017: blood culture at outside hospital: no growth x 48 hours   respiratory culture: normal resp mitchel, < 10 epithelial cells per low power field, few wbcs, no organisms seen   blood cx: NGTD   urine cx: NGTD    Significant Imagin/28 CXR:   Confluent airspace opacity left lower lung zone concerning for pneumonia and possible associated atelectatic changes the right hemithorax is mostly clear as well as the left upper lung zone.  No pneumothorax or definite pleural effusion.  The osseous structures appear normal.        Impression:          Left lower lobe pneumonia with associated volume loss    - improved since then      Assessment/Plan:     Neuro  Seizure  Increased seizure activity likely exacerbated by infection - with aspiration pneumonia vs. Virus.  Currently seizure-free since admission.  - continue home medications:  - diazepam 10mg po BID  - Lamotrigine 200mg Q12  - Phenobarbital 194.4mg qhs  - topiramate 200mg q12  - while in hospital, lorazepam prn seizure and seizure precautions    Autism  Currently stable and patient is in good spirits.  - continue home clonazepam 0.5mg nightly for sleep  - no need for physical restraints  - consider consult to PT and OT    Epilepsy  See assessment and plan for seizure. Patient to follow up closely with Dr. Ortez, his primary neurologist.    Pulmonary  * Aspiration pneumonia  Lamont is a 21 year old young man with epilepsy, autism, scoliosis, and developmental delay here for increased seizure activity and fever.  He is currently much improved on  day 5 of zosyn.  - consult ID, appreciate recommendations  - repeat CXR today, follow up cardiomegaly- if still there, will obtain echocardiogram  - consider transition to oral antibiotic therapy today  - follow up respiratory viral panel  - continue dental soft diet and encourage PO intake  - no need for IV fluids at this time  - if concern for further aspiration, consider consult to speech therapy  - keep head of bed elevated  - consider stopping daily CBC and CMP    Fluids/Electrolytes/Nutrition/GI  Low serum phosphorus for age  Improved since supplementation  - continue phosphorus supplement 1 tab for 2 more doses, then recheck in morning    Other  Developmental delay  - lovenox for VTE prophylaxis           Anticipated Disposition: Home or Self Care pending afebrile, seizure free, tolerating po meds, no aspiration    Herlinda Garcia MD  Pediatric Hospital Medicine   [facility]    I have personally taken the history and examined this patient and agree with the resident's note as stated above.  Doing really well today. Will observe today, culturelle for abx associated diarrhea.  Appreciate ID input - will change to po clinda x 10 day total course for home.  Repeat phos in am, 2 additional doses today.  Anticipate home tomorrow.  Discussed with aunt.   Jose David Traore MD

## 2017-03-05 VITALS
HEART RATE: 102 BPM | HEIGHT: 63 IN | WEIGHT: 94 LBS | RESPIRATION RATE: 24 BRPM | SYSTOLIC BLOOD PRESSURE: 125 MMHG | DIASTOLIC BLOOD PRESSURE: 88 MMHG | BODY MASS INDEX: 16.66 KG/M2 | OXYGEN SATURATION: 97 % | TEMPERATURE: 99 F

## 2017-03-05 PROBLEM — R79.0 LOW SERUM PHOSPHORUS FOR AGE: Status: RESOLVED | Noted: 2017-03-04 | Resolved: 2017-03-05

## 2017-03-05 LAB
BACTERIA BLD CULT: NORMAL
BACTERIA BLD CULT: NORMAL
PHOSPHATE SERPL-MCNC: 3.1 MG/DL

## 2017-03-05 PROCEDURE — 63600175 PHARM REV CODE 636 W HCPCS: Performed by: STUDENT IN AN ORGANIZED HEALTH CARE EDUCATION/TRAINING PROGRAM

## 2017-03-05 PROCEDURE — 25000003 PHARM REV CODE 250: Performed by: STUDENT IN AN ORGANIZED HEALTH CARE EDUCATION/TRAINING PROGRAM

## 2017-03-05 PROCEDURE — 97116 GAIT TRAINING THERAPY: CPT

## 2017-03-05 PROCEDURE — 99239 HOSP IP/OBS DSCHRG MGMT >30: CPT | Mod: ,,, | Performed by: PEDIATRICS

## 2017-03-05 PROCEDURE — 36415 COLL VENOUS BLD VENIPUNCTURE: CPT

## 2017-03-05 PROCEDURE — 25000003 PHARM REV CODE 250: Performed by: INTERNAL MEDICINE

## 2017-03-05 PROCEDURE — 84100 ASSAY OF PHOSPHORUS: CPT

## 2017-03-05 RX ORDER — CLINDAMYCIN HYDROCHLORIDE 150 MG/1
450 CAPSULE ORAL 3 TIMES DAILY
Qty: 30 CAPSULE | Refills: 0 | Status: SHIPPED | OUTPATIENT
Start: 2017-03-05 | End: 2017-03-09

## 2017-03-05 RX ADMIN — Medication 1 CAPSULE: at 09:03

## 2017-03-05 RX ADMIN — PIPERACILLIN SODIUM AND TAZOBACTAM SODIUM 4.5 G: 4; .5 INJECTION, POWDER, LYOPHILIZED, FOR SOLUTION INTRAVENOUS at 03:03

## 2017-03-05 RX ADMIN — PIPERACILLIN SODIUM AND TAZOBACTAM SODIUM 4.5 G: 4; .5 INJECTION, POWDER, LYOPHILIZED, FOR SOLUTION INTRAVENOUS at 12:03

## 2017-03-05 RX ADMIN — LAMOTRIGINE 200 MG: 100 TABLET ORAL at 09:03

## 2017-03-05 RX ADMIN — TOPIRAMATE 200 MG: 200 TABLET, FILM COATED ORAL at 09:03

## 2017-03-05 RX ADMIN — DIAZEPAM 10 MG: 5 TABLET ORAL at 09:03

## 2017-03-05 RX ADMIN — ENOXAPARIN SODIUM 40 MG: 100 INJECTION SUBCUTANEOUS at 01:03

## 2017-03-05 NOTE — PLAN OF CARE
Problem: Patient Care Overview  Goal: Plan of Care Review  Outcome: Ongoing (interventions implemented as appropriate)  Pt awake this shift. Pt roma meals w/o nausea/vomiting. Pt w 2 large dk brown liq stools this shift. Antibiotics adm in as ordered. Heplock x2 in place. Tele in progress w NSR this shift. Parent at bedside aware of consults ordered this shift. VSS

## 2017-03-05 NOTE — PLAN OF CARE
Problem: Patient Care Overview  Goal: Plan of Care Review  Outcome: Outcome(s) achieved Date Met:  03/05/17  Patient doing well this shift. Free from distress throughout shift. Free from seizure activity throughout shift. Telemetry in place, free from alarms throughout shift. IV abx in progress. VSS, afebrile. Good PO intake and good UOP, 1 loose BM this shift. Plan of care discussed with aunt throughout shift, mother arrived in afternoon just prior to discharge and updated on plan of care, both aunt and mother verbalized understanding to all. Discharge instructions and sheet given to mother and family, Rx which was picked up from pharmacy verified by this RN, verbalized understanding to all. Both IV's to right arm removed, pressure held then gauze and coban applied. Family wanting to give patient bath prior to discharge now that IVs are out, supplies given. No distress noted to patient at this time.

## 2017-03-05 NOTE — PLAN OF CARE
Problem: Pressure Ulcer Risk (Justo Scale) (Adult,Obstetrics,Pediatric)  Goal: Identify Related Risk Factors and Signs and Symptoms  Related risk factors and signs and symptoms are identified upon initiation of Human Response Clinical Practice Guideline (CPG)   Outcome: Ongoing (interventions implemented as appropriate)  Sm reddened area noted to R lower buttocks area. Pt turned q2h this shift. Diapers changed prn

## 2017-03-05 NOTE — DISCHARGE INSTRUCTIONS
Make sure that Lamont takes 3 tablets of clindamycin every 8 hours for as long as it is prescribed.  It is important to take the whole course of antibiotics even if he is feeling better.  Follow up with Dr. Ortez as scheduled.  You may give culturelle as needed for diarrhea caused by the antibiotics.  Please call for a follow up with Dr. Thomas for Monday or Tuesday to follow up his pneumonia, seizures, diarrhea, and potential speech therapy and physical therapy.

## 2017-03-05 NOTE — PT/OT/SLP RE-EVAL
Occupational Therapy  Re-evaluation/Discharge    Lamont Villarreal   MRN: [id number]   Admitting Diagnosis: Aspiration pneumonia    OT Date of Treatment: 03/05/17 Co-treat with PT  OT Start Time: 0820  OT Stop Time: 0846  OT Total Time (min): 26 min    Billable Minutes:  Re-eval 13 min  Self Care/Home Management 13 min    Diagnosis: Aspiration pneumonia   H/o Autism and developmental delay  Admitted for multiple seizures, admitted to [facility], then now has stepped down to Peds floor  OT originally eval'd pt in [facility], intubated/sedated      Past Medical History:   Diagnosis Date    Developmental delay     Mental disorder     Seizures       History reviewed. No pertinent surgical history.    Referring physician: Elissa Botello MD   Date referred to OT: 3/4/17    General Precautions: Standard, aspiration, fall, seizure  Orthopedic Precautions: N/A  Braces: N/A    Do you have any cultural, spiritual, Yarsanism conflicts, given your current situation?: none noted     Patient History:  Living Environment  Living Environment Comment: Pt intubated and pt's mother reports pt is non-verbal at baseline. PLOF/social history obtained from mother. Pt lives with mother and 4 younger siblings ( 9 m/o, 7,12,19 y/o) in a [facility] with 0 EZEQUIEL; tub present in the bathroom. PTA, pt dependent with all ADLs except (I) with feeding. Pt was (I) with functional mobility except required (A) when ambulating stairs. Pt's mother is primary caregiver. Pt was not receiving therapy prior to hospital stay.  Equipment Currently Used at Home: none    Prior level of function:   Bed Mobility/Transfers: needs assist  Grooming: needs assist  Bathing: needs assist  Upper Body Dressing: needs assist  Lower Body Dressing: needs assist  Toileting: needs assist        Dominant hand: right    Subjective:  Communicated with RN prior to session.  Pt's great aunt agreeable to therapy session  Chief Complaint: None reported  Patient/Family stated goals: Return  "home    Pain Rating:  (0/10 on FLACC scale)  Pain Rating Post-Intervention: 0/10 (on FLACC scale)    Objective:  Patient found with: peripheral IV, telemetry    Cognitive Exam:  Oriented to: None  Follows Commands/attention: Follows one-step commands  Communication: Nonverbal at baseline, however was able to say "Hey" and fixated on the word "Afraid" throughout session  Memory:  JOHN  Safety awareness/insight to disability: impaired  Coping skills/emotional control: Appropriate to situation    Visual/perceptual:  Intact    Physical Exam:  Postural examination/scapula alignment: Scoliosis  Skin integrity: Visible skin intact  Edema: None noted    Sensation:   Intact    Upper Extremity Range of Motion:  Right Upper Extremity: WFL  Left Upper Extremity: WFL    Upper Extremity Strength:  Right Upper Extremity: WFL  Left Upper Extremity: WFL   Strength: WFL bilaterally    Fine motor coordination:   Intact- able to button buttons on t-shirt    Gross motor coordination: WFL    Functional Mobility:  Bed Mobility:  Scooting/Bridging: Minimum Assistance (Min Assist in sitting to scoot to EOB, CGA for bridging in bed)  Supine to Sit: Contact Guard Assistance  Sit to Supine: Contact Guard Assistance    Transfers:  Sit <> Stand Assistance: Minimum Assistance (from EOB, more assist required for initiation)  Sit <> Stand Assistive Device: No Assistive Device (HHA)    Functional Ambulation: Pt performed mobility within room and down hallway, ~220 ft, with CGA HHA. No evidence of LOB or SOB with mobility.    Activities of Daily Living:    UE Dressing Level of Assistance: Moderate assistance (to don t-shirt)    LE Dressing Level of Assistance: Moderate assistance (to don slippers and pants)     Toileting Where Assessed: Bed level  Toileting Level of Assistance: Total assistance (for mau hygiene and clothing management)      Balance:   Static Sit: FAIR+: Able to take MINIMAL challenges from all directions  Dynamic Sit: FAIR: " "Cannot move trunk without losing balance  Static Stand: FAIR+: Takes MINIMAL challenges from all directions  Dynamic stand: FAIR: Needs CONTACT GUARD during gait    Therapeutic Activities and Exercises:  - Pt's great aunt educated on OT role and POC, as well as safety with assisting ADLs and mobility.    AM-PAC 6 CLICK ADL  How much help from another person does this patient currently need?  1 = Unable, Total/Dependent Assistance  2 = A lot, Maximum/Moderate Assistance  3 = A little, Minimum/Contact Guard/Supervision  4 = None, Modified [place]/Independent    Putting on and taking off regular lower body clothing? : 2  Bathing (including washing, rinsing, drying)?: 2  Toileting, which includes using toilet, bedpan, or urinal? : 2  Putting on and taking off regular upper body clothing?: 2  Taking care of personal grooming such as brushing teeth?: 3  Eating meals?: 3  Total Score: 14    AM-PAC Raw Score CMS "G-Code Modifier Level of Impairment Assistance   6 % Total / Unable   7 - 9 CM 80 - 100% Maximal Assist   10 - 14 CL 60 - 80% Moderate Assist   15 - 19 CK 40 - 60% Moderate Assist   20 - 22 CJ 20 - 40% Minimal Assist   23 CI 1-20% SBA / CGA   24 CH 0% Independent/ Mod I       Patient left supine with all lines intact and call button in reach    Assessment:  aLmont Villarreal is a 21 y.o. male with a medical diagnosis of Aspiration pneumonia and presents with significant improvements in mobility and ADL participation since OT original evaluation. Lamont appears to be close to baseline with ADL performance, and does not require skilled OT intervention in the acute care setting. No further OT needs warranted following D/C.    Rehab identified problem list/impairments: Rehab identified problem list/impairments: weakness, impaired endurance, decreased coordination, gait instability, impaired self care skills, impaired functional mobilty, impaired balance, impaired cognition, decreased safety awareness    Rehab " potential is good.    Activity tolerance: Good    Discharge recommendations: Discharge Facility/Level Of Care Needs: home     Barriers to discharge: Barriers to Discharge: None    Equipment recommendations: none     GOALS:   Occupational Therapy Goals     Not on file      Multidisciplinary Problems (Resolved)        Problem: Occupational Therapy Goal    Goal Priority Disciplines Outcome Interventions   Occupational Therapy Goal   (Resolved)     OT, PT/OT Outcome(s) achieved    Description:  No goals at this time.              PLAN:  D/C acute care OT  Plan of Care reviewed with: family         ARNOLDO Kang  03/05/2017

## 2017-03-05 NOTE — SUBJECTIVE & OBJECTIVE
Interval History: Lamont did well overnight. He had two large brown liquid stools.  He is up watching TV this morning and smiling.  The Infectious Disease team assessed patient yesterday and recommended a transition to oral clindamycin for discharge.  No fevers or seizures.       Scheduled Meds:   clonazePAM  0.5 mg Oral QHS    diazePAM  10 mg Oral Q12H    enoxaparin  40 mg Subcutaneous Daily    Lactobacillus rhamnosus GG  1 capsule Oral Daily    lamotrigine  200 mg Oral Q12H    phenobarbital  194.4 mg Per G Tube Nightly    piperacillin-tazobactam 4.5 g in dextrose 5 % 100 mL IVPB (ready to mix system)  4.5 g Intravenous Q8H    sodium chloride 0.9%  3 mL Intravenous Q8H    topiramate  200 mg Oral Q12H     Continuous Infusions:   PRN Meds:acetaminophen, lorazepam, pneumoc 13-fanny conj-dip cr(PF)    Review of Systems   Constitutional: Negative for fever.   Respiratory: Positive for cough.    Gastrointestinal: Positive for diarrhea.   Genitourinary: Negative for decreased urine volume.   Neurological: Negative for seizures.     Objective:     Vital Signs (Most Recent):  Temp: 97.3 °F (36.3 °C) (03/05/17 0445)  Pulse: 77 (03/05/17 0500)  Resp: 20 (03/05/17 0445)  BP: 124/77 (03/05/17 0445)  SpO2: 95 % (03/05/17 0445) Vital Signs (24h Range):  Temp:  [97.3 °F (36.3 °C)-99.5 °F (37.5 °C)] 97.3 °F (36.3 °C)  Pulse:  [72-95] 77  Resp:  [18-28] 20  SpO2:  [95 %-96 %] 95 %  BP: (119-136)/(67-84) 124/77     No data found.    Body mass index is 16.78 kg/(m^2).    Intake/Output - Last 3 Shifts       03/03 0700 - 03/04 0659 03/04 0700 - 03/05 0659 03/05 0700 - 03/06 0659    P.O. 1250 720     I.V. (mL/kg) 565 (13.3)      NG/GT       IV Piggyback 550 300     Total Intake(mL/kg) 2365 (55.5) 1020 (23.9)     Urine (mL/kg/hr) 635 (0.6)      Other 300 (0.3)      Stool 0 (0)      Total Output 935        Net +1430 +1020             Urine Occurrence 4 x 5 x     Stool Occurrence 4 x 4 x           Lines/Drains/Airways     Peripheral  Intravenous Line                 Peripheral IV - Single Lumen 02/28/17 0553 Right Forearm 5 days         Peripheral IV - Single Lumen 03/03/17 1407 Right Forearm 1 day                Physical Exam   Constitutional:   Lying in bed comfortably, smiling, watching TV, no distress   HENT:   Head: Atraumatic.   Eyes: Conjunctivae are normal. Pupils are equal, round, and reactive to light.   Neck: Neck supple.   Cardiovascular: Normal rate, regular rhythm and normal heart sounds.    No murmur heard.  Pulmonary/Chest: Effort normal.   Frontal lung fields clear to auscultation with good air movement- no wheezes or rhonchi   Abdominal: Soft. Bowel sounds are normal. There is no tenderness.   Musculoskeletal: He exhibits no tenderness.   Lymphadenopathy:     He has no cervical adenopathy.   Neurological: He is alert.   Skin: Skin is warm.       Significant Labs:  Phosphorus: 3.1 (up from 2.7)  2/27 blood cultures: no growth to date  Microbiology Results (last 7 days)     Procedure Component Value Units Date/Time    Blood Culture #2 **CANNOT BE ORDERED STAT** [[id number]] Collected:  02/28/17 0554    Order Status:  Completed Specimen:  Blood from Peripheral, Antecubital, Left Updated:  03/04/17 0812     Blood Culture, Routine No Growth to date     Blood Culture, Routine No Growth to date     Blood Culture, Routine No Growth to date     Blood Culture, Routine No Growth to date     Blood Culture, Routine No Growth to date    Blood Culture #1 **CANNOT BE ORDERED STAT** [[id number]] Collected:  02/28/17 0553    Order Status:  Completed Specimen:  Blood from Peripheral, Forearm, Right Updated:  03/04/17 0812     Blood Culture, Routine No Growth to date     Blood Culture, Routine No Growth to date     Blood Culture, Routine No Growth to date     Blood Culture, Routine No Growth to date     Blood Culture, Routine No Growth to date    Culture, Respiratory [[id number]] Collected:  02/28/17 1750    Order Status:  Completed Specimen:   Respiratory from Sputum Updated:  03/02/17 1001     Respiratory Culture Normal respiratory mitchel     Gram Stain (Respiratory) <10 epithelial cells per low power field.     Gram Stain (Respiratory) Few WBC's     Gram Stain (Respiratory) No organisms seen    Urine culture [[id number]] Collected:  02/28/17 0554    Order Status:  Completed Specimen:  Urine from Catheterized Updated:  03/01/17 1336     Urine Culture, Routine No growth    Narrative:       Urine culture added per Dr. Bradford, order ID [id number] 02/28/17 11:01    Respiratory Viral Panel by PCR [[id number]] Collected:  02/28/17 1909    Order Status:  Completed Updated:  03/01/17 0951     Respiratory Virus Panel, source Nasopharyngeal Swab    Respiratory Viral Panel by PCR [[id number]] Collected:  02/28/17 1241    Order Status:  Completed Updated:  03/01/17 0947     Respiratory Virus Panel, source NP swab    Respiratory virus antigens panel [[id number]] Collected:  02/28/17 1549    Order Status:  Canceled Specimen:  Respiratory from Nasopharyngeal Swab Updated:  02/28/17 1823    Narrative:       Respiratory Viruses - DFA was cancelled on 02/28/2017 at 19:09 by   [email or web address]; Test changed to sendout.  02/28/2017  19:09    Respiratory virus antigens panel [[id number]] Collected:  02/28/17 1057    Order Status:  Canceled Specimen:  Respiratory from Nasopharyngeal Swab Updated:  02/28/17 1107    Narrative:       Respiratory Viruses - DFA was cancelled on 02/28/2017 at 12:41 by   MPE; Test changed to sendout    Urine culture [[id number]]     Order Status:  Completed Specimen:  Urine from Urine, Catheterized     Urine culture [[id number]]     Order Status:  Canceled Specimen:  Urine       RVP: pending      Significant Imaging:   3/4 CXR: The cardiomediastinal silhouette is midline and within normal limits.  Interval improvement with near-complete resolution of bilateral, left greater than right, airspace opacities.  No evidence of pleural effusion.  Visualized osseous structures  and soft tissues are unchanged.

## 2017-03-05 NOTE — ASSESSMENT & PLAN NOTE
Lamont is a 21 year old young man with epilepsy, autism, scoliosis, and developmental delay here for increased seizure activity and fever.  He is currently much improved on day 6 of zosyn.  - transition to clindamycin 450 mg oral TID to complete 10 days total of antibiotic therapy  - follow up respiratory viral panel  - continue dental soft diet and encourage PO intake  - no need for IV fluids at this time  - keep head of bed elevated

## 2017-03-05 NOTE — PLAN OF CARE
Problem: Physical Therapy Goal  Goal: Physical Therapy Goal  Goals to be met by: 3/10/17     Patient will increase functional independence with mobility by performin. Supine to sit with Minimal Assistance MET  2. Sit to supine with Minimal Assistance MET  3. Sit to stand transfer with Minimal Assistance MET  4. Bed to chair transfer with Minimal Assistance MET  5. Gait x 50 feet with Minimal Assistance with or without appropriate AD MET     Outcome: Ongoing (interventions implemented as appropriate)  PT goals met. No further skilled PT needs at this time.

## 2017-03-05 NOTE — PLAN OF CARE
Problem: Occupational Therapy Goal  Goal: Occupational Therapy Goal  No goals at this time.  Outcome: Outcome(s) achieved Date Met:  03/05/17  OT re-evaluation completed today, now that pt has stepped down to pediatric unit. Pt is functioning close to baseline and no further OT services are warranted at this time. Recommend pt return home with family assist upon D/C.  ARNOLDO Holland  3/5/2017

## 2017-03-05 NOTE — PLAN OF CARE
Problem: Patient Care Overview  Goal: Plan of Care Review  Outcome: Ongoing (interventions implemented as appropriate)  Pt remained stable throughout night shift. No distress noted. VSS, afebrile. Neuro at baseline for pt. No seizure-like activity noted. PIVs in place, pt receiving Zosyn q8.  Aunt at bedside, interacting with pt and involved in pt care. Tele in place, no significant alarms noted.  Pt tolerating po well, dental soft diet with thin liquids in place. Pt is incontinent, medium sized brief in place. Pt likes to lie in a contracted fetal position and requires repositioning. POC reviewed with aunt, verbalized understanding. Seizure precautions, aspiration precautions and safety maintained. Will continue to monitor.

## 2017-03-05 NOTE — PROGRESS NOTES
[facility] Pediatric Hospital Medicine  Progress Note    Patient Name: Lamont Villarreal  MRN: [id number]  Admission Date: 2/28/2017  Hospital Length of Stay: 5  Code Status: Full Code   Primary Care Physician: Hollis Thomas MD  Principal Problem: Aspiration pneumonia    Subjective:     HPI:  This is Mr. Lamont Villarreal, 21 year old male known to have Epilepsy, Seizure, Autism, Developmental delay presented to outside hospital ED ([facility]) after he developed two episodes of seizure in the yesterday, his aunt brought him to outside hospital. They had some laboratory investigations for his and showed to have a positive Strept Throat and negative Flu. Brought to hospital by EMS and he had a seizure en rout to [facility] ED managed with one time IV Midazolam. He suppose to be admitted to pediatric floor, but giving his seizure he was brought to ED. He had another seizure in ED which counts him as the fourth seizure in the last 24 hours.      His history source was his aunt and some history from his aunt and briefly from his mother over the phone. He was on his usual state of health until yesterday, when he developed cough, and looking sick. He had fever and upper respiratory tract symptoms. No recent travel, no sick contact or change in his medication. Family unsure about the last time he had seizure. Of note, he is following Dr. Ortez as his pediatric neurologist, with last visit on 4/2016. We touched based with pediatric ICU staff for possible transfer patient to PICU giving his weight and history of seizure.          Hospital Course:       Scheduled Meds:   clonazePAM  0.5 mg Oral QHS    diazePAM  10 mg Oral Q12H    enoxaparin  40 mg Subcutaneous Daily    Lactobacillus rhamnosus GG  1 capsule Oral Daily    lamotrigine  200 mg Oral Q12H    phenobarbital  194.4 mg Per G Tube Nightly    piperacillin-tazobactam 4.5 g in dextrose 5 % 100 mL IVPB (ready to mix system)  4.5 g Intravenous Q8H     sodium chloride 0.9%  3 mL Intravenous Q8H    topiramate  200 mg Oral Q12H     Continuous Infusions:   PRN Meds:acetaminophen, lorazepam, pneumoc 13-fanny conj-dip cr(PF)    Interval History: Lamont did well overnight. He had two large brown liquid stools.  He is up watching TV this morning and smiling.  The Infectious Disease team assessed patient yesterday and recommended a transition to oral clindamycin for discharge.  No fevers or seizures.       Scheduled Meds:   clonazePAM  0.5 mg Oral QHS    diazePAM  10 mg Oral Q12H    enoxaparin  40 mg Subcutaneous Daily    Lactobacillus rhamnosus GG  1 capsule Oral Daily    lamotrigine  200 mg Oral Q12H    phenobarbital  194.4 mg Per G Tube Nightly    piperacillin-tazobactam 4.5 g in dextrose 5 % 100 mL IVPB (ready to mix system)  4.5 g Intravenous Q8H    sodium chloride 0.9%  3 mL Intravenous Q8H    topiramate  200 mg Oral Q12H     Continuous Infusions:   PRN Meds:acetaminophen, lorazepam, pneumoc 13-fanny conj-dip cr(PF)    Review of Systems   Constitutional: Negative for fever.   Respiratory: Positive for cough.    Gastrointestinal: Positive for diarrhea.   Genitourinary: Negative for decreased urine volume.   Neurological: Negative for seizures.     Objective:     Vital Signs (Most Recent):  Temp: 97.3 °F (36.3 °C) (03/05/17 0445)  Pulse: 77 (03/05/17 0500)  Resp: 20 (03/05/17 0445)  BP: 124/77 (03/05/17 0445)  SpO2: 95 % (03/05/17 0445) Vital Signs (24h Range):  Temp:  [97.3 °F (36.3 °C)-99.5 °F (37.5 °C)] 97.3 °F (36.3 °C)  Pulse:  [72-95] 77  Resp:  [18-28] 20  SpO2:  [95 %-96 %] 95 %  BP: (119-136)/(67-84) 124/77     No data found.    Body mass index is 16.78 kg/(m^2).    Intake/Output - Last 3 Shifts       03/03 0700 - 03/04 0659 03/04 0700 - 03/05 0659 03/05 0700 - 03/06 0659    P.O. 1250 720     I.V. (mL/kg) 565 (13.3)      NG/GT       IV Piggyback 550 300     Total Intake(mL/kg) 2365 (55.5) 1020 (23.9)     Urine (mL/kg/hr) 635 (0.6)      Other 300 (0.3)       Stool 0 (0)      Total Output 935        Net +1430 +1020             Urine Occurrence 4 x 5 x     Stool Occurrence 4 x 4 x           Lines/Drains/Airways     Peripheral Intravenous Line                 Peripheral IV - Single Lumen 02/28/17 0553 Right Forearm 5 days         Peripheral IV - Single Lumen 03/03/17 1407 Right Forearm 1 day                Physical Exam   Constitutional:   Lying in bed comfortably, smiling, watching TV, no distress   HENT:   Head: Atraumatic.   Eyes: Conjunctivae are normal. Pupils are equal, round, and reactive to light.   Neck: Neck supple.   Cardiovascular: Normal rate, regular rhythm and normal heart sounds.    No murmur heard.  Pulmonary/Chest: Effort normal.   Frontal lung fields clear to auscultation with good air movement- no wheezes or rhonchi   Abdominal: Soft. Bowel sounds are normal. There is no tenderness.   Musculoskeletal: He exhibits no tenderness.   Lymphadenopathy:     He has no cervical adenopathy.   Neurological: He is alert.   Skin: Skin is warm.       Significant Labs:  Phosphorus: 3.1 (up from 2.7)  2/27 blood cultures: no growth to date  Microbiology Results (last 7 days)     Procedure Component Value Units Date/Time    Blood Culture #2 **CANNOT BE ORDERED STAT** [[id number]] Collected:  02/28/17 0554    Order Status:  Completed Specimen:  Blood from Peripheral, Antecubital, Left Updated:  03/04/17 0812     Blood Culture, Routine No Growth to date     Blood Culture, Routine No Growth to date     Blood Culture, Routine No Growth to date     Blood Culture, Routine No Growth to date     Blood Culture, Routine No Growth to date    Blood Culture #1 **CANNOT BE ORDERED STAT** [[id number]] Collected:  02/28/17 0553    Order Status:  Completed Specimen:  Blood from Peripheral, Forearm, Right Updated:  03/04/17 0812     Blood Culture, Routine No Growth to date     Blood Culture, Routine No Growth to date     Blood Culture, Routine No Growth to date     Blood Culture,  Routine No Growth to date     Blood Culture, Routine No Growth to date    Culture, Respiratory [[id number]] Collected:  02/28/17 1750    Order Status:  Completed Specimen:  Respiratory from Sputum Updated:  03/02/17 1001     Respiratory Culture Normal respiratory mitchel     Gram Stain (Respiratory) <10 epithelial cells per low power field.     Gram Stain (Respiratory) Few WBC's     Gram Stain (Respiratory) No organisms seen    Urine culture [[id number]] Collected:  02/28/17 0554    Order Status:  Completed Specimen:  Urine from Catheterized Updated:  03/01/17 1336     Urine Culture, Routine No growth    Narrative:       Urine culture added per Dr. Bradford, order ID [id number] 02/28/17 11:01    Respiratory Viral Panel by PCR [[id number]] Collected:  02/28/17 1909    Order Status:  Completed Updated:  03/01/17 0951     Respiratory Virus Panel, source Nasopharyngeal Swab    Respiratory Viral Panel by PCR [[id number]] Collected:  02/28/17 1241    Order Status:  Completed Updated:  03/01/17 0947     Respiratory Virus Panel, source NP swab    Respiratory virus antigens panel [[id number]] Collected:  02/28/17 1549    Order Status:  Canceled Specimen:  Respiratory from Nasopharyngeal Swab Updated:  02/28/17 1823    Narrative:       Respiratory Viruses - DFA was cancelled on 02/28/2017 at 19:09 by   [email or web address]; Test changed to sendout.  02/28/2017  19:09    Respiratory virus antigens panel [[id number]] Collected:  02/28/17 1057    Order Status:  Canceled Specimen:  Respiratory from Nasopharyngeal Swab Updated:  02/28/17 1107    Narrative:       Respiratory Viruses - DFA was cancelled on 02/28/2017 at 12:41 by   MPE; Test changed to sendout    Urine culture [[id number]]     Order Status:  Completed Specimen:  Urine from Urine, Catheterized     Urine culture [[id number]]     Order Status:  Canceled Specimen:  Urine       RVP: pending      Significant Imaging:   3/4 CXR: The cardiomediastinal silhouette is midline and within normal limits.   Interval improvement with near-complete resolution of bilateral, left greater than right, airspace opacities.  No evidence of pleural effusion.  Visualized osseous structures and soft tissues are unchanged.    Assessment/Plan:     Neuro  Seizure  Increased seizure activity likely exacerbated by infection - with aspiration pneumonia vs. Virus.  Currently seizure-free since admission.  - continue home medications:  - diazepam 10mg po BID  - Lamotrigine 200mg Q12  - Phenobarbital 194.4mg qhs  - topiramate 200mg q12  - while in hospital, lorazepam prn seizure and seizure precautions    Autism  Currently stable and patient is in good spirits.  - continue home clonazepam 0.5mg nightly for sleep  - no need for physical restraints    Epilepsy  See assessment and plan for seizure. Patient to follow up closely with Dr. Ortez, his primary neurologist.    Pulmonary  * Aspiration pneumonia  Lamont is a 21 year old young man with epilepsy, autism, scoliosis, and developmental delay here for increased seizure activity and fever.  He is currently much improved on day 6 of zosyn.  - transition to clindamycin 450 mg oral TID to complete 10 days total of antibiotic therapy  - follow up respiratory viral panel  - continue dental soft diet and encourage PO intake  - no need for IV fluids at this time  - keep head of bed elevated    Other  Developmental delay  - lovenox for VTE prophylaxis           Anticipated Disposition: Home or Self Care possibly today    Herlinda Garica MD  Pediatric Hospital Medicine   [facility]    I have personally taken the history and examined this patient and agree with the resident's note as stated above.  Doing nicely today, Aunt ready to take him home.  At his baseline.  Breathing comfortably, smiling, talking lots.   Still with loose stool.  Home today on clinda & culturelle.  F/U PCP and Peds Neuro.  Family to bring Lamont to MD with any new symptoms.  Wrote Rx for outpatient  PT.  Consider outpatient ST.  Aunt updated, pleased with care plan.  Jose David Traore MD

## 2017-03-05 NOTE — PT/OT/SLP PROGRESS
Physical Therapy  Treatment    Lamont Villarreal   MRN: [id number]   Admitting Diagnosis: Aspiration pneumonia    PT Received On: 17  PT Start Time: 820     PT Stop Time: 845    PT Total Time (min): 25 min       Billable Minutes:  Gait Training 15 and Therapeutic Activity 10    Treatment Type: Treatment  PT/PTA: PT         General Precautions: Standard, aspiration, fall, seizure    Do you have any cultural, spiritual, Anglican conflicts, given your current situation?: no conflicts    Subjective:  Communicated with RN prior to session.  Pt awake and alert with aunt present. Pt and family agreeable to PT.     Pain Ratin/10 (FLACC)    Objective:   Patient found with: peripheral IV, telemetry    Functional Mobility:  Bed Mobility:   Scooting/Bridging: Minimum Assistance  Supine to Sit: Contact Guard Assistance  Sit to Supine: Contact Guard Assistance    Transfers:  Sit <> Stand Assistance: Minimum Assistance (pt likely able to perform without assist however required increased motivation therefore min assist)  Sit <> Stand Assistive Device: No Assistive Device    Gait:   Gait Distance: 220 feet  Assistance 1: Contact Guard Assistance  Gait Assistive Device: Hand held assist  Gait Pattern: swing-to gait  Gait Deviation(s): decreased magda, increased time in double stance, decreased velocity of limb motion, decreased step length, foot slap    Balance:   Static Sit: GOOD: Takes MODERATE challenges from all directions  Dynamic Sit: GOOD: Maintains balance through MODERATE excursions of active trunk movement  Static Stand: FAIR: Maintains without assist but unable to take challenges  Dynamic stand: FAIR: Needs CONTACT GUARD during gait     Therapeutic Activities and Exercises:  Pt performed bridging in bed to scoot, for changing diaper and while dressing. Assist required to bring feet flat on bed to perform adequate lift of hips.     AM-PAC 6 CLICK MOBILITY  How much help from another person does this patient  currently need?   1 = Unable, Total/Dependent Assistance  2 = A lot, Maximum/Moderate Assistance  3 = A little, Minimum/Contact Guard/Supervision  4 = None, Modified [place]/Independent    Turning over in bed (including adjusting bedclothes, sheets and blankets)?: 3  Sitting down on and standing up from a chair with arms (e.g., wheelchair, bedside commode, etc.): 3  Moving from lying on back to sitting on the side of the bed?: 3  Moving to and from a bed to a chair (including a wheelchair)?: 3  Need to walk in hospital room?: 3  Climbing 3-5 steps with a railing?: 2  Total Score: 17    AM-PAC Raw Score CMS G-Code Modifier Level of Impairment Assistance   6 % Total / Unable   7 - 9 CM 80 - 100% Maximal Assist   10 - 14 CL 60 - 80% Moderate Assist   15 - 19 CK 40 - 60% Moderate Assist   20 - 22 CJ 20 - 40% Minimal Assist   23 CI 1-20% SBA / CGA   24 CH 0% Independent/ Mod I     Patient left supine with HOB elevated with all lines intact, call button in reach and aunt in bathroom. Aunt educated on importance of regular mobility to assist pt in returning to [facility].     Assessment:  Lamont Villarreal is a 21 y.o. male with a medical diagnosis of Aspiration pneumonia and presents with slight gait instability compared to baseline, however aunt reports that gait is getting better and is very close to baseline. Pt very motivated to perform activity and family demonstrated good understanding of safety with mobility. No further skilled PT needs at this time.    Rehab identified problem list/impairments: Rehab identified problem list/impairments: weakness, impaired endurance, impaired functional mobilty, gait instability, impaired balance, impaired cognition, decreased safety awareness    Rehab potential is good.    Activity tolerance: Good    Discharge recommendations: Discharge Facility/Level Of Care Needs: home, no further PT needs    Barriers to discharge: Barriers to Discharge: None    Equipment recommendations:  Equipment Needed After Discharge: none     GOALS:   Physical Therapy Goals        Problem: Physical Therapy Goal    Goal Priority Disciplines Outcome Goal Variances Interventions   Physical Therapy Goal     PT/OT, PT Ongoing (interventions implemented as appropriate)     Description:  Goals to be met by: 3/10/17     Patient will increase functional independence with mobility by performin. Supine to sit with Minimal Assistance MET  2. Sit to supine with Minimal Assistance MET  3. Sit to stand transfer with Minimal Assistance MET  4. Bed to chair transfer with Minimal Assistance MET  5. Gait  x 50 feet with Minimal Assistance with or without appropriate AD MET                   PLAN:    Patient to be discharged from  PT.   Plan of Care expires: 17  Plan of Care reviewed with: family (aunt)     Jessica LeJeune, PT, DPT  [phone]

## 2017-03-05 NOTE — ASSESSMENT & PLAN NOTE
Currently stable and patient is in good spirits.  - continue home clonazepam 0.5mg nightly for sleep  - no need for physical restraints

## 2017-03-06 NOTE — DISCHARGE SUMMARY
[facility] Pediatric Hospital Medicine  Discharge Summary      Patient Name: Lamont Villarreal  MRN: [id number]  Admission Date: 2/28/2017  Hospital Length of Stay: 5 days  Discharge Date and Time: 3/5/2017  3:35 PM  Discharging Provider: David Zurita MD  Primary Care Provider: Hollis Thomas MD    Reason for Admission: Seizures    Hospital Course: Lamont is a 21 year old young man with a history of epilepsy, autism, and developmental delay who presented with increased seizure frequency. In the ED, he was shown to have positive strep and was [place] [facility] [place] and [place]. En route [place] [facility], he had continued seizure activity in the ambulance and was diverted to the ED.     In the ED, he had another seizure and was intubated by the MICU service for airway protection. He was admitted to the MICU where extended EEG showed no subclinical seizures. He was continued on his home AEDs and started on vanc and zosyn, which were ultimately discontinued as septic work-up was negative. He was extubated on 3/2 and was stable overnight prior to transfer to the pediatric floor.     Seizures: Lamont remained seizure-free on the floor. The activity that led to his admission is thought to be likely exacerbated by infection. He was continued on his home AEDs: diazepam, lamotrigine, phenobarbital, topiramate.     Aspiration pneumonia: Infectious disease was consulted and recommended zosyn with transition to oral clinda for discharge. Lamont was discharged to complete 10 days of therapy.     Autism: Lamont was continued on his home clonazepam for sleep. He required restraints in the MICU, but had no behavioral issues on the floor. He was seen by PT and OT while inpatient and they identified no further skilled PT needs while inpatient, however he was discharged with a referral to outpatient therapy.       Lamont remained hemodynamically stable with normal vital signs on the floor. He was  eating and drinking normally. He was voiding well. He had some loose stools, which improved with culturelle. He was discharged on culturelle and clindamycin to complete a 10 day course as well as his home medications. He will follow up outpatient with his PCP and neurology.         Consults:   Consults         Status Ordering Provider     Inpatient consult to Critical Care Medicine  Once     Provider:  (Not yet assigned)    Completed THADDEUS PENA     Inpatient consult to neurology epilepsy  Once     Provider:  (Not yet assigned)    Completed ZENAIDA KWOK     Inpatient consult to Pediatric Infectious Disease  Once     Provider:  Haylee Castano MD    Completed ANDERS KOCH          Significant Labs:   Recent Results (from the past 72 hour(s))   Comprehensive metabolic panel    Collection Time: 03/03/17  7:48 AM   Result Value Ref Range    Sodium 138 136 - 145 mmol/L    Potassium 3.9 3.5 - 5.1 mmol/L    Chloride 109 95 - 110 mmol/L    CO2 20 (L) 23 - 29 mmol/L    Glucose 90 70 - 110 mg/dL    BUN, Bld 6 6 - 20 mg/dL    Creatinine 0.7 0.5 - 1.4 mg/dL    Calcium 9.0 8.7 - 10.5 mg/dL    Total Protein 7.3 6.0 - 8.4 g/dL    Albumin 3.6 3.5 - 5.2 g/dL    Total Bilirubin 0.8 0.1 - 1.0 mg/dL    Alkaline Phosphatase 164 (H) 55 - 135 U/L    AST 60 (H) 10 - 40 U/L    ALT 45 (H) 10 - 44 U/L    Anion Gap 9 8 - 16 mmol/L    eGFR if African American >60.0 >60 mL/min/1.73 m^2    eGFR if non African American >60.0 >60 mL/min/1.73 m^2   Phosphorus    Collection Time: 03/03/17  4:54 PM   Result Value Ref Range    Phosphorus 1.7 (L) 2.7 - 4.5 mg/dL   Comprehensive metabolic panel    Collection Time: 03/04/17  5:33 AM   Result Value Ref Range    Sodium 141 136 - 145 mmol/L    Potassium 3.6 3.5 - 5.1 mmol/L    Chloride 109 95 - 110 mmol/L    CO2 23 23 - 29 mmol/L    Glucose 110 70 - 110 mg/dL    BUN, Bld 4 (L) 6 - 20 mg/dL    Creatinine 0.7 0.5 - 1.4 mg/dL    Calcium 9.1 8.7 - 10.5 mg/dL    Total Protein 7.3 6.0 - 8.4 g/dL     Albumin 3.7 3.5 - 5.2 g/dL    Total Bilirubin 0.5 0.1 - 1.0 mg/dL    Alkaline Phosphatase 183 (H) 55 - 135 U/L     (H) 10 - 40 U/L    ALT 89 (H) 10 - 44 U/L    Anion Gap 9 8 - 16 mmol/L    eGFR if African American >60.0 >60 mL/min/1.73 m^2    eGFR if non African American >60.0 >60 mL/min/1.73 m^2   Magnesium    Collection Time: 03/04/17  5:33 AM   Result Value Ref Range    Magnesium 1.8 1.6 - 2.6 mg/dL   Phosphorus    Collection Time: 03/04/17  5:33 AM   Result Value Ref Range    Phosphorus 2.7 2.7 - 4.5 mg/dL   CBC auto differential    Collection Time: 03/04/17  5:33 AM   Result Value Ref Range    WBC 8.02 3.90 - 12.70 K/uL    RBC 4.32 (L) 4.60 - 6.20 M/uL    Hemoglobin 13.3 (L) 14.0 - 18.0 g/dL    Hematocrit 38.2 (L) 40.0 - 54.0 %    MCV 88 82 - 98 fL    MCH 30.8 27.0 - 31.0 pg    MCHC 34.8 32.0 - 36.0 %    RDW 13.3 11.5 - 14.5 %    Platelets 193 150 - 350 K/uL    MPV 9.9 9.2 - 12.9 fL    Gran # 4.9 1.8 - 7.7 K/uL    Lymph # 1.9 1.0 - 4.8 K/uL    Mono # 0.7 0.3 - 1.0 K/uL    Eos # 0.4 0.0 - 0.5 K/uL    Baso # 0.03 0.00 - 0.20 K/uL    Gran% 60.7 38.0 - 73.0 %    Lymph% 23.2 18.0 - 48.0 %    Mono% 9.1 4.0 - 15.0 %    Eosinophil% 5.1 0.0 - 8.0 %    Basophil% 0.4 0.0 - 1.9 %    Differential Method Automated    Phosphorus    Collection Time: 03/05/17  5:30 AM   Result Value Ref Range    Phosphorus 3.1 2.7 - 4.5 mg/dL         Significant Imaging:   Imaging Results         X-Ray Chest 1 View (Final result) Result time:  03/04/17 11:37:39    Final result by Louise Costello MD (03/04/17 11:37:39)    Impression:      1.  As above.      Electronically signed by: LOUISE COSTELLO MD  Date:     03/04/17  Time:    11:37     Narrative:    Comparison: March 2, 2017    Technique: Frontal view of the chest    Findings: The cardiomediastinal silhouette is midline and within normal limits.  Interval improvement with near-complete resolution of bilateral, left greater than right, airspace opacities.  No evidence of pleural  effusion.  Visualized osseous structures and soft tissues are unchanged.            X-Ray Chest 1 View (Final result) Result time:  03/02/17 08:09:03    Final result by Rai Leavitt III, MD (03/02/17 08:09:03)    Narrative:    One view: Central line in SVC, NG tip below diaphragm, ETT at T4.  There is cardiomegaly, moderate or severe edema, and slight improvement.      Electronically signed by: RAI LEAVITT  Date:     03/02/17  Time:    08:09             US Abdomen Limited (Final result) Result time:  03/01/17 16:24:36    Final result by Lefty Mack MD (03/01/17 16:24:36)    Impression:      #1.  Distended gallbladder with multiple stones.  No ancillary sonographic signs to suggest acute cholecystitis.      Electronically signed by: LEFTY MACK MD  Date:     03/01/17  Time:    16:24     Narrative:    History: High bilirubin, sepsis    Comparison: None.    Findings: The liver measures 17.1 cm and is unremarkable.  There is no intra or extrahepatic bile duct dilatation.  The common duct measures 3 mm.    The gallbladder is distended and demonstrates multiple mobile stones.  The largest stone measures 1.2 cm.  There is no gallbladder wall thickening, pericholecystic fluid, or sonographic Ferguson's sign.    The visualized portions of the pancreas are unremarkable.      The spleen could not be visualized due to difficulty in maneuvering the patient.    There is no free fluid within the visualized abdomen.            X-Ray Chest 1 View (Final result) Result time:  03/01/17 03:50:08    Final result by Michelle Fontanez MD (03/01/17 03:50:08)    Impression:        ET tube tip 2 cm above the anita. RIGHT IJ catheter tip overlies the SVC. Esophageal probe tip overlies the distal esophagus. Enteric tube extends below the hemidiaphragm and off the inferior edge of the image.        Electronically signed by: MICHELLE FONTANEZ MD  Date:     03/01/17  Time:    03:50     Narrative:    Chest AP  portable    Indication:ETT tube placement.    Comparison:February 28, 2017.    Findings:     ET tube tip 2 cm above the anita. RIGHT IJ catheter tip overlies the SVC. Esophageal probe tip overlies the distal esophagus. Enteric tube extends below the hemidiaphragm and off the inferior edge of the image.    Heart and lungs unchanged when allowing for differences in technique and positioning.            X-Ray Chest 1 View (Final result) Result time:  02/28/17 13:58:06    Final result by Marlin Denny MD (02/28/17 13:58:06)    Impression:        1.  The distal tip of the enteric tube is noted at or just below the GE junction.  Consider advancing the tube 10 cm.    2.  The ET tube and right central venous catheter appear in appropriate position.     3.  Confluent airspace opacity remains involving the left lower lung zone in addition to atelectatic changes in the right lower lung zone.      Electronically signed by: Marlin Denny  Date:     02/28/17  Time:    13:58     Narrative:    History: ET tube and line placement    Comparison: Chest radiograph from 2/28/17.     Technique: A single frontal chest radiograph was obtained and reviewed.    Findings:     The distal tip of the endotracheal tube is located approximately 4 cm above the anita.  A right central venous catheter is present with the distal catheter tip overlying the superior vena cava atrial junction.  An enteric tube is noted with the distal tip at or just below the level of the GE junction.    The cardiomediastinal silhouette appears unchanged.  Confluent airspace opacity remains present involving the left lower lung zone in addition to atelectatic changes in the right lower lung zone.  No pneumothorax is detected.    The soft tissues and osseous structures demonstrate nothing acute.            X-Ray Chest 1 View (Final result) Result time:  02/28/17 08:07:36    Final result by Nikki Diaz MD (02/28/17 08:07:36)    Impression:     Left lower  lobe pneumonia with associated volume loss      Electronically signed by: SWETA BONNER MD  Date:     02/28/17  Time:    08:07     Narrative:    AP chest    Comparison: None    Results: Confluent airspace opacity left lower lung zone concerning for pneumonia and possible associated atelectatic changes the right hemithorax is mostly clear as well as the left upper lung zone.  No pneumothorax or definite pleural effusion.  The osseous structures appear normal.                Pending Diagnostic Studies:     None          Final Active Diagnoses:    Diagnosis Date Noted POA    PRINCIPAL PROBLEM:  Aspiration pneumonia [J69.0] 03/02/2017 Yes    Seizure [R56.9] 07/21/2014 Yes    Autism [F84.0] 07/21/2014 Yes    Developmental delay [R62.50] 07/21/2014 Yes    Epilepsy [G40.909] 09/26/2012 Yes      Problems Resolved During this Admission:    Diagnosis Date Noted Date Resolved POA    Low serum phosphorus for age [R79.0] 03/04/2017 03/05/2017 No    Sepsis [A41.9] 02/28/2017 03/03/2017 Yes    On mechanically assisted ventilation [Z99.11] 02/28/2017 03/03/2017 Not Applicable       Discharged Condition: good    Disposition: Home or Self Care    Follow Up:  Follow-up Information     Follow up with Hollis Thomas MD. Schedule an appointment as soon as possible for a visit on 3/7/2017.    Specialty:  Family Medicine    Why:  hospital follow up    Contact information:    [address] [facility]  [place] [id number] [phone]          Follow up with Yvan Ortez II, MD.    Specialty:  Pediatric Neurology    Contact information:    [address]  [place] [address]  [phone]          Patient Instructions:     Ambulatory Referral to Physical/Occupational Therapy   Referral Priority: Routine Referral Type: Physical Medicine   Referral Reason: Specialty Services Required    Referred to Provider: [facility] Specialty: Physical Therapy   Number of Visits  Requested: 1        Medications:  Reconciled Home Medications:   Discharge Medication List as of 3/5/2017  2:30 PM      START taking these medications    Details   clindamycin (CLEOCIN) 150 MG capsule Take 3 capsules (450 mg total) by mouth 3 (three) times daily., Starting 3/5/2017, Until Thu 3/9/17, Normal      Lactobacillus rhamnosus GG (CULTURELLE) 10 billion cell capsule Take 1 capsule by mouth once daily., Starting 3/5/2017, Until Tue 4/4/17, Normal         CONTINUE these medications which have NOT CHANGED    Details   diazePAM (VALIUM) 10 MG Tab One three times daily, Print      lamotrigine (LAMICTAL) 200 MG tablet Take 1 tablet (200 mg total) by mouth 2 (two) times daily. Take one tablet twice a day., Starting 10/5/2016, Until Discontinued, Normal      phenobarbital (LUMINAL) 64.8 MG tablet Three at bedtime, Print      topiramate (TOPAMAX) 200 MG Tab Take 1 tablet (200 mg total) by mouth 2 (two) times daily., Starting 10/5/2016, Until Thu 10/5/17, Normal      clonazePAM (KLONOPIN) 0.5 MG tablet Take 1 tablet (0.5 mg total) by mouth every evening., Starting 10/10/2016, Until Tue 10/10/17, Print             David Zurita MD  Pediatric Hospital Medicine  [facility]

## 2017-03-06 NOTE — PLAN OF CARE
03/06/17 1248   Final Note   Assessment Type Discharge Planning Assessment   Discharge Disposition Home   Discharge plans and expectations educations in teach back method with documentation complete? Yes   Discharge/Hospital Encounter Summary to ([facility]) PCP n/a   pt dc'd home on the weekend.

## 2017-03-07 LAB
RVP - ADENOVIRUS: NORMAL
RVP - ADENOVIRUS: NORMAL
RVP - HUMAN METAPNEUMOVIRUS (HMPV): NORMAL
RVP - HUMAN METAPNEUMOVIRUS (HMPV): NORMAL
RVP - INFLUENZA A SUBTYPE H1 - (SEASONAL): NORMAL
RVP - INFLUENZA A SUBTYPE H1 - (SEASONAL): NORMAL
RVP - INFLUENZA A SUBTYPE H3 - (SEASONAL): NORMAL
RVP - INFLUENZA A SUBTYPE H3 - (SEASONAL): NORMAL
RVP - INFLUENZA A: NORMAL
RVP - INFLUENZA A: NORMAL
RVP - INFLUENZA B: NORMAL
RVP - INFLUENZA B: NORMAL
RVP - PARAINFLUENZA VIRUS 1: NORMAL
RVP - PARAINFLUENZA VIRUS 1: NORMAL
RVP - PARAINFLUENZA VIRUS 2: NORMAL
RVP - PARAINFLUENZA VIRUS 2: NORMAL
RVP - PARAINFLUENZA VIRUS 3: NORMAL
RVP - PARAINFLUENZA VIRUS 3: NORMAL
RVP - RESPIRATORY SYNCTIAL VIRUS (RSV) A: NORMAL
RVP - RESPIRATORY SYNCTIAL VIRUS (RSV) A: NORMAL
RVP - RESPIRATORY SYNCTIAL VIRUS (RSV) B: NORMAL
RVP - RESPIRATORY SYNCTIAL VIRUS (RSV) B: NORMAL
RVP - RESPIRATORY VIRAL PANEL, SOURCE: NORMAL
RVP - RESPIRATORY VIRAL PANEL, SOURCE: NORMAL
RVP - RHINOVIRUS: NORMAL
RVP - RHINOVIRUS: NORMAL

## 2017-03-30 ENCOUNTER — OFFICE VISIT (OUTPATIENT)
Dept: PEDIATRIC NEUROLOGY | Facility: CLINIC | Age: 22
End: 2017-03-30
Payer: COMMERCIAL

## 2017-03-30 ENCOUNTER — LAB VISIT (OUTPATIENT)
Dept: LAB | Facility: HOSPITAL | Age: 22
End: 2017-03-30
Attending: PSYCHIATRY & NEUROLOGY
Payer: COMMERCIAL

## 2017-03-30 VITALS
SYSTOLIC BLOOD PRESSURE: 98 MMHG | WEIGHT: 91.69 LBS | DIASTOLIC BLOOD PRESSURE: 65 MMHG | BODY MASS INDEX: 16.38 KG/M2 | HEART RATE: 51 BPM

## 2017-03-30 DIAGNOSIS — G40.909 SEIZURE DISORDER: ICD-10-CM

## 2017-03-30 DIAGNOSIS — F84.0 AUTISM: ICD-10-CM

## 2017-03-30 DIAGNOSIS — A49.1 STREPTOCOCCAL INFECTION: ICD-10-CM

## 2017-03-30 DIAGNOSIS — R56.9 CONVULSIONS, UNSPECIFIED CONVULSION TYPE: ICD-10-CM

## 2017-03-30 DIAGNOSIS — G40.909 SEIZURE DISORDER: Primary | ICD-10-CM

## 2017-03-30 DIAGNOSIS — R40.1 STUPOR: ICD-10-CM

## 2017-03-30 DIAGNOSIS — H51.9 ABNORMAL EYE MOVEMENTS: ICD-10-CM

## 2017-03-30 DIAGNOSIS — G40.911 STATUS EPILEPTICUS DUE TO REFRACTORY EPILEPSY: ICD-10-CM

## 2017-03-30 DIAGNOSIS — G40.209 PARTIAL SYMPTOMATIC EPILEPSY WITH COMPLEX PARTIAL SEIZURES, NOT INTRACTABLE, WITHOUT STATUS EPILEPTICUS: ICD-10-CM

## 2017-03-30 DIAGNOSIS — R40.20 LOC (LOSS OF CONSCIOUSNESS): ICD-10-CM

## 2017-03-30 DIAGNOSIS — J69.0 ASPIRATION PNEUMONIA OF LEFT UPPER LOBE, UNSPECIFIED ASPIRATION PNEUMONIA TYPE: ICD-10-CM

## 2017-03-30 DIAGNOSIS — G40.109 PARTIAL SYMPTOMATIC EPILEPSY WITH SIMPLE PARTIAL SEIZURES, NOT INTRACTABLE, WITHOUT STATUS EPILEPTICUS: ICD-10-CM

## 2017-03-30 DIAGNOSIS — G40.201 PARTIAL SYMPTOMATIC EPILEPSY WITH COMPLEX PARTIAL SEIZURES, NOT INTRACTABLE, WITH STATUS EPILEPTICUS: ICD-10-CM

## 2017-03-30 LAB
ALT SERPL W/O P-5'-P-CCNC: 42 U/L
ANION GAP SERPL CALC-SCNC: 11 MMOL/L
BASOPHILS # BLD AUTO: 0.04 K/UL
BASOPHILS NFR BLD: 0.5 %
BUN SERPL-MCNC: 14 MG/DL
CALCIUM SERPL-MCNC: 9.5 MG/DL
CHLORIDE SERPL-SCNC: 109 MMOL/L
CO2 SERPL-SCNC: 23 MMOL/L
CREAT SERPL-MCNC: 1 MG/DL
DIFFERENTIAL METHOD: ABNORMAL
EOSINOPHIL # BLD AUTO: 0.7 K/UL
EOSINOPHIL NFR BLD: 8.2 %
ERYTHROCYTE [DISTWIDTH] IN BLOOD BY AUTOMATED COUNT: 13.5 %
EST. GFR  (AFRICAN AMERICAN): >60 ML/MIN/1.73 M^2
EST. GFR  (NON AFRICAN AMERICAN): >60 ML/MIN/1.73 M^2
GLUCOSE SERPL-MCNC: 90 MG/DL
HCT VFR BLD AUTO: 42.6 %
HGB BLD-MCNC: 14.1 G/DL
LYMPHOCYTES # BLD AUTO: 3.4 K/UL
LYMPHOCYTES NFR BLD: 42.9 %
MCH RBC QN AUTO: 30.7 PG
MCHC RBC AUTO-ENTMCNC: 33.1 %
MCV RBC AUTO: 93 FL
MONOCYTES # BLD AUTO: 0.8 K/UL
MONOCYTES NFR BLD: 9.5 %
NEUTROPHILS # BLD AUTO: 3.1 K/UL
NEUTROPHILS NFR BLD: 38.9 %
PHENOBARB SERPL-MCNC: 58.6 UG/DL
PLATELET # BLD AUTO: 167 K/UL
PMV BLD AUTO: 10.3 FL
POTASSIUM SERPL-SCNC: 4.1 MMOL/L
RBC # BLD AUTO: 4.59 M/UL
SODIUM SERPL-SCNC: 143 MMOL/L
WBC # BLD AUTO: 7.91 K/UL

## 2017-03-30 PROCEDURE — 80184 ASSAY OF PHENOBARBITAL: CPT

## 2017-03-30 PROCEDURE — 80048 BASIC METABOLIC PNL TOTAL CA: CPT

## 2017-03-30 PROCEDURE — 85025 COMPLETE CBC W/AUTO DIFF WBC: CPT | Mod: PO

## 2017-03-30 PROCEDURE — 99999 PR PBB SHADOW E&M-EST. PATIENT-LVL III: CPT | Mod: PBBFAC,,, | Performed by: PSYCHIATRY & NEUROLOGY

## 2017-03-30 PROCEDURE — 80201 ASSAY OF TOPIRAMATE: CPT

## 2017-03-30 PROCEDURE — 1160F RVW MEDS BY RX/DR IN RCRD: CPT | Mod: S$GLB,,, | Performed by: PSYCHIATRY & NEUROLOGY

## 2017-03-30 PROCEDURE — 36415 COLL VENOUS BLD VENIPUNCTURE: CPT | Mod: PO

## 2017-03-30 PROCEDURE — 80175 DRUG SCREEN QUAN LAMOTRIGINE: CPT

## 2017-03-30 PROCEDURE — 84460 ALANINE AMINO (ALT) (SGPT): CPT

## 2017-03-30 PROCEDURE — 99215 OFFICE O/P EST HI 40 MIN: CPT | Mod: S$GLB,,, | Performed by: PSYCHIATRY & NEUROLOGY

## 2017-03-30 RX ORDER — LAMOTRIGINE 200 MG/1
200 TABLET ORAL 2 TIMES DAILY
Qty: 60 TABLET | Refills: 5 | Status: SHIPPED | OUTPATIENT
Start: 2017-03-30 | End: 2017-09-12 | Stop reason: SDUPTHER

## 2017-03-30 RX ORDER — PHENOBARBITAL 64.8 MG/1
TABLET ORAL
Qty: 90 TABLET | Refills: 5 | Status: SHIPPED | OUTPATIENT
Start: 2017-03-30 | End: 2017-09-12 | Stop reason: SDUPTHER

## 2017-03-30 RX ORDER — TOPIRAMATE 200 MG/1
200 TABLET ORAL 2 TIMES DAILY
Qty: 60 TABLET | Refills: 5 | Status: SHIPPED | OUTPATIENT
Start: 2017-03-30 | End: 2017-09-12 | Stop reason: SDUPTHER

## 2017-03-30 RX ORDER — CLONAZEPAM 0.5 MG/1
0.5 TABLET ORAL NIGHTLY
Qty: 30 TABLET | Refills: 5 | Status: SHIPPED | OUTPATIENT
Start: 2017-03-30 | End: 2017-09-12 | Stop reason: SDUPTHER

## 2017-03-30 RX ORDER — DIAZEPAM 10 MG/1
TABLET ORAL
Qty: 90 TABLET | Refills: 5 | Status: SHIPPED | OUTPATIENT
Start: 2017-03-30 | End: 2017-09-12 | Stop reason: SDUPTHER

## 2017-03-30 NOTE — PROGRESS NOTES
March 30, 2017    Hollis Thomas M.D.  [address] [place] [address]    Dear Dr. Thomas:    I saw Lamont Villarreal in follow-up at [facility] on 03/30/2017.  I had last seen him   in clinic on 04/15/2016.  This is a 21-year-old young man with autism and a   seizure disorder, which has been reasonably controlled:  About one very brief   seizure per month, taking clonazepam 0.5 mg at bedtime, Lamictal 200 mg twice   daily and Topamax 200 mg twice daily and phenobarbital 64.8 mg tablets, three   daily.  He was admitted to hospital at the time of a strep infection with high   fever.  On 02/28/2017, in status epilepticus and had an aspiration pneumonia and   was briefly on a ventilator.  He was treated with antibiotics and has   recovered.  He had been vomiting his medications and at the time of his   admission, his Lamictal level was 1.5, Topamax 1.6 and phenobarbital 33.  He has   had two seizures in the last month, one 2 weeks ago and one 2 days ago, both   one-minute generalized clonic events with abnormal upward eye movements, loss of   consciousness and subsequent stupor.    No other intercurrent illness, surgery, medication, allergy or injury.    Immunizations are up-to-date.  No family history of neurologic disease.  He   lives with his mother.    GENERAL REVIEW OF SYSTEMS:  Otherwise benign.    PHYSICAL EXAMINATION:  VITAL SIGNS:  Weight 41.6 kg, blood pressure 98/65, pulse 51.  GENERAL:  Normal body habitus.  Lamont was quite uncooperative, but he does   have full eye movements, symmetrical facial movements and a symmetrical,   although somewhat shuffling gait.  I was unable to examine him beyond this.    At the present time, I have continued his current medications and ordered drug   levels, a CBC and basic metabolic panel (he had a low potassium once in the   hospital) and an ALT.  I have asked his mother to return to clinic within the   month to review his drug levels and decide what other changes  might be made in   his anticonvulsant regimen.  He had a prolonged EEG in the hospital, which   showed no epileptic activity.    Sincerely,      TYLER  dd: 03/30/2017 11:17:40 (CDT)  td: 03/31/2017 11:10:06 (CDT)  Doc ID   #[id number]  Job ID #[id number]    CC:     This office note has been dictated.

## 2017-03-30 NOTE — TELEPHONE ENCOUNTER
----- Message from Haley Keith sent at 3/30/2017  3:22 PM CDT -----  Contact: [facility] [phone]  [facility] [phone]  --------requesting a return re pt, pt is missing a Rx from today visit

## 2017-03-30 NOTE — TELEPHONE ENCOUNTER
----- Message from Haley Keith sent at 3/30/2017  3:22 PM CDT -----  Contact: [facility] [phone]  [facility] [phone]  --------requesting a return re pt, pt is missing a Rx from today visit

## 2017-03-30 NOTE — MR AVS SNAPSHOT
[facility]  [address]  [facility] [address]  Phone: [phone]                  Lamont Villarreal   3/30/2017 10:20 AM   Office Visit    Description:  Male : 1995   Provider:  Yvan Ortez II, MD   Department:  [facility]           Diagnoses this Visit        Comments    Seizure disorder    -  Primary     Convulsions, unspecified convulsion type         LOC (loss of consciousness)         Stupor         Abnormal eye movements         Status epilepticus due to refractory epilepsy         Aspiration pneumonia of left upper lobe, unspecified aspiration pneumonia type         Streptococcal infection         Partial symptomatic epilepsy with complex partial seizures, not intractable, with status epilepticus         Partial symptomatic epilepsy with complex partial seizures, not intractable, without status epilepticus         Partial symptomatic epilepsy with simple partial seizures, not intractable, without status epilepticus                To Do List           Goals (5 Years of Data)     None       These Medications        Disp Refills Start End    topiramate (TOPAMAX) 200 MG Tab 60 tablet 5 3/30/2017 3/30/2018    Take 1 tablet (200 mg total) by mouth 2 (two) times daily. - Oral    Pharmacy: [facility]/pharmacy #[id number] - [place], [address] Ph #: [phone]       phenobarbital (LUMINAL) 64.8 MG tablet 90 tablet 5 3/30/2017     Three at bedtime    Pharmacy: [facility]/pharmacy #[id number] - [place], [address] Ph #: [phone]       lamotrigine (LAMICTAL) 200 MG tablet 60 tablet 5 3/30/2017     Take 1 tablet (200 mg total) by mouth 2 (two) times daily. Take one tablet twice a day. - Oral    Pharmacy: [facility]/pharmacy #[id number] - [place], [address] Ph #: [phone]       clonazePAM (KLONOPIN) 0.5 MG tablet 30 tablet 5 3/30/2017 3/30/2018    Take 1 tablet (0.5 mg total) by mouth every evening. - Oral    Pharmacy: [facility]/pharmacy #[id number] - [place],  [place] - [address] [facility] #: [phone]         [facility] On Call     [facility] On Call Nurse Care Line - 24/7 Assistance  Unless otherwise directed by your provider, please contact [facility], our nurse care line that is available for 24/7 assistance.     Registered nurses in the [facility] provide: appointment scheduling, clinical advisement, health education, and other advisory services.  Call: [phone] (toll free)               Medications           Message regarding Medications     Verify the changes and/or additions to your medication regime listed below are the same as discussed with your clinician today.  If any of these changes or additions are incorrect, please notify your healthcare provider.             Verify that the below list of medications is an accurate representation of the medications you are currently taking.  If none reported, the list may be blank. If incorrect, please contact your healthcare provider. Carry this list with you in case of emergency.           Current Medications     clonazePAM (KLONOPIN) 0.5 MG tablet Take 1 tablet (0.5 mg total) by mouth every evening.    diazePAM (VALIUM) 10 MG Tab One three times daily    Lactobacillus rhamnosus GG (CULTURELLE) 10 billion cell capsule Take 1 capsule by mouth once daily.    lamotrigine (LAMICTAL) 200 MG tablet Take 1 tablet (200 mg total) by mouth 2 (two) times daily. Take one tablet twice a day.    phenobarbital (LUMINAL) 64.8 MG tablet Three at bedtime    topiramate (TOPAMAX) 200 MG Tab Take 1 tablet (200 mg total) by mouth 2 (two) times daily.           Clinical Reference Information           Your Vitals Were     BP Pulse Weight BMI       98/65 (BP Location: Left arm, Patient Position: Sitting) 51 41.6 kg (91 lb 11.4 oz) 16.38 kg/m2       Blood Pressure          Most Recent Value    BP  98/65      Allergies as of 3/30/2017     No Known Allergies      Immunizations Administered on Date of Encounter - 3/30/2017     None       Orders Placed During Today's Visit     Future Labs/Procedures Expected by Expires    ALT  3/30/2017 5/29/2018    Basic metabolic panel  3/30/2017 5/29/2018    CBC auto differential  3/30/2017 5/29/2018    Lamotrigine level  3/30/2017 5/29/2018    Phenobarbital level  3/30/2017 5/29/2018    Topiramate level  3/30/2017 5/29/2018      [facility] Sign-Up     Activating your [facility] account is as easy as 1-2-3!     1) Visit [email or web address], select Sign Up Now, enter this activation code and your date of birth, then select Next.  [email or web address]  Expires: 4/19/2017 10:52 AM      2) Create a username and password to use when you visit [facility] in the future and select a security question in case you lose your password and select Next.    3) Enter your e-mail address and click Sign Up!    Additional Information  If you have questions, please e-mail [email or web address] or call [phone] to talk to our [facility] staff. Remember, [facility] is NOT to be used for urgent needs. For medical emergencies, dial 911.         Language Assistance Services     ATTENTION: Language assistance services are available, free of charge. Please call [phone].      ATENCIÓN: Si habla español, tiene a carreon disposición servicios gratuitos de asistencia lingüística. Llame al [phone].     CHÚ Ý: N?u b?n nói Ti?ng Vi?t, có các d?ch v? h? tr? ngôn ng? mi?n phí dành cho b?n. G?i s? [phone].         [facility] complies with applicable Federal civil rights laws and does not discriminate on the basis of race, color, national origin, age, disability, or sex.

## 2017-03-31 NOTE — TELEPHONE ENCOUNTER
----- Message from Veronica Layton sent at 3/31/2017  9:57 AM CDT -----  Contact: [facility] [phone]  Mom calling to check the status of the pt script. Please call mom to advise --------- [facility] [phone]

## 2017-03-31 NOTE — TELEPHONE ENCOUNTER
Diazepam rx was faxed to pharmacy today. Mother contacted and notified; she verbalized understanding.

## 2017-04-01 LAB — TOPIRAMATE SERPL-MCNC: 9.6 MCG/ML

## 2017-04-03 LAB — LAMOTRIGINE SERPL-MCNC: 8.5 UG/ML (ref 2–15)

## 2017-09-12 DIAGNOSIS — R56.9 CONVULSIONS, UNSPECIFIED CONVULSION TYPE: ICD-10-CM

## 2017-09-12 DIAGNOSIS — G40.909 SEIZURE DISORDER: ICD-10-CM

## 2017-09-12 DIAGNOSIS — G40.109 PARTIAL SYMPTOMATIC EPILEPSY WITH SIMPLE PARTIAL SEIZURES, NOT INTRACTABLE, WITHOUT STATUS EPILEPTICUS: ICD-10-CM

## 2017-09-12 DIAGNOSIS — G40.209 PARTIAL SYMPTOMATIC EPILEPSY WITH COMPLEX PARTIAL SEIZURES, NOT INTRACTABLE, WITHOUT STATUS EPILEPTICUS: ICD-10-CM

## 2017-09-12 DIAGNOSIS — G40.201 PARTIAL SYMPTOMATIC EPILEPSY WITH COMPLEX PARTIAL SEIZURES, NOT INTRACTABLE, WITH STATUS EPILEPTICUS: ICD-10-CM

## 2017-09-12 RX ORDER — TOPIRAMATE 200 MG/1
200 TABLET ORAL 2 TIMES DAILY
Qty: 60 TABLET | Refills: 0 | Status: SHIPPED | OUTPATIENT
Start: 2017-09-12 | End: 2017-10-12 | Stop reason: SDUPTHER

## 2017-09-12 RX ORDER — DIAZEPAM 10 MG/1
TABLET ORAL
Qty: 90 TABLET | Refills: 0 | Status: SHIPPED | OUTPATIENT
Start: 2017-09-12 | End: 2017-10-12 | Stop reason: SDUPTHER

## 2017-09-12 RX ORDER — PHENOBARBITAL 64.8 MG/1
TABLET ORAL
Qty: 90 TABLET | Refills: 0 | Status: SHIPPED | OUTPATIENT
Start: 2017-09-12 | End: 2017-10-12 | Stop reason: SDUPTHER

## 2017-09-12 RX ORDER — LAMOTRIGINE 200 MG/1
200 TABLET ORAL 2 TIMES DAILY
Qty: 60 TABLET | Refills: 5 | Status: SHIPPED | OUTPATIENT
Start: 2017-09-12 | End: 2017-10-12 | Stop reason: SDUPTHER

## 2017-09-12 RX ORDER — CLONAZEPAM 0.5 MG/1
0.5 TABLET ORAL NIGHTLY
Qty: 30 TABLET | Refills: 0 | Status: SHIPPED | OUTPATIENT
Start: 2017-09-12 | End: 2017-10-12 | Stop reason: SDUPTHER

## 2017-09-12 NOTE — TELEPHONE ENCOUNTER
----- Message from Cherry Grant sent at 9/12/2017 12:22 PM CDT -----  Contact: Mom [phone]  Mom says the pt needs a refill of his medicine because his next visit isn't until 10-12-17. Mom is requesting a call back to discuss this.

## 2017-10-10 ENCOUNTER — TELEPHONE (OUTPATIENT)
Dept: PEDIATRIC NEUROLOGY | Facility: CLINIC | Age: 22
End: 2017-10-10

## 2017-10-12 ENCOUNTER — OFFICE VISIT (OUTPATIENT)
Dept: PEDIATRIC NEUROLOGY | Facility: CLINIC | Age: 22
End: 2017-10-12
Payer: COMMERCIAL

## 2017-10-12 VITALS
WEIGHT: 94.94 LBS | BODY MASS INDEX: 17.92 KG/M2 | HEART RATE: 61 BPM | HEIGHT: 61 IN | DIASTOLIC BLOOD PRESSURE: 62 MMHG | SYSTOLIC BLOOD PRESSURE: 98 MMHG

## 2017-10-12 DIAGNOSIS — R62.50 DEVELOPMENTAL DELAY: ICD-10-CM

## 2017-10-12 DIAGNOSIS — G40.909 SEIZURE DISORDER: Primary | ICD-10-CM

## 2017-10-12 DIAGNOSIS — G40.109 PARTIAL SYMPTOMATIC EPILEPSY WITH SIMPLE PARTIAL SEIZURES, NOT INTRACTABLE, WITHOUT STATUS EPILEPTICUS: ICD-10-CM

## 2017-10-12 DIAGNOSIS — G40.201 PARTIAL SYMPTOMATIC EPILEPSY WITH COMPLEX PARTIAL SEIZURES, NOT INTRACTABLE, WITH STATUS EPILEPTICUS: ICD-10-CM

## 2017-10-12 DIAGNOSIS — R56.9 CONVULSIONS, UNSPECIFIED CONVULSION TYPE: ICD-10-CM

## 2017-10-12 DIAGNOSIS — G40.209 PARTIAL SYMPTOMATIC EPILEPSY WITH COMPLEX PARTIAL SEIZURES, NOT INTRACTABLE, WITHOUT STATUS EPILEPTICUS: ICD-10-CM

## 2017-10-12 DIAGNOSIS — F84.0 AUTISM: ICD-10-CM

## 2017-10-12 PROCEDURE — 99214 OFFICE O/P EST MOD 30 MIN: CPT | Mod: S$GLB,,, | Performed by: PSYCHIATRY & NEUROLOGY

## 2017-10-12 PROCEDURE — 99999 PR PBB SHADOW E&M-EST. PATIENT-LVL III: CPT | Mod: PBBFAC,,, | Performed by: PSYCHIATRY & NEUROLOGY

## 2017-10-12 RX ORDER — LAMOTRIGINE 200 MG/1
200 TABLET ORAL 2 TIMES DAILY
Qty: 60 TABLET | Refills: 5 | Status: SHIPPED | OUTPATIENT
Start: 2017-10-12 | End: 2018-08-21 | Stop reason: SDUPTHER

## 2017-10-12 RX ORDER — DIAZEPAM 10 MG/1
TABLET ORAL
Qty: 90 TABLET | Refills: 5 | Status: SHIPPED | OUTPATIENT
Start: 2017-10-12 | End: 2018-08-21 | Stop reason: SDUPTHER

## 2017-10-12 RX ORDER — PHENOBARBITAL 64.8 MG/1
TABLET ORAL
Qty: 90 TABLET | Refills: 5 | Status: SHIPPED | OUTPATIENT
Start: 2017-10-12 | End: 2018-08-21 | Stop reason: SDUPTHER

## 2017-10-12 RX ORDER — TOPIRAMATE 200 MG/1
200 TABLET ORAL 2 TIMES DAILY
Qty: 60 TABLET | Refills: 5 | Status: SHIPPED | OUTPATIENT
Start: 2017-10-12 | End: 2018-08-21 | Stop reason: SDUPTHER

## 2017-10-12 RX ORDER — CLONAZEPAM 0.5 MG/1
0.5 TABLET ORAL NIGHTLY
Qty: 30 TABLET | Refills: 5 | Status: SHIPPED | OUTPATIENT
Start: 2017-10-12 | End: 2018-08-21 | Stop reason: SDUPTHER

## 2017-10-12 NOTE — PROGRESS NOTES
October 12, 2017    Hollis Thomas M.D.  [address] [place]  [address]    RE:  LAMONT VILLARREAL  [facility]  [id number]    Dear Dr. Thomas:    I saw Lamont Villarreal at [facility] on October 12, 2017, in followup for his autism   and seizure disorder.  I saw him last on March 30th.  Since that time, he has   had only two seizures, one in June and one in September:  Both were brief   generalized clonic convulsions lasting a minute or less.  He is currently taking   Lamictal 200 mg twice daily and Topamax 200 mg twice daily, diazepam 10 mg   three times daily, clonazepam 0.5 mg at bedtime and phenobarbital 64.8 mg   tablets, three daily.  He is nonverbal.  He is quite developmentally delayed.    No other illness, surgery, medication, allergy or injury.  No clear family   history of epilepsy.  He lives with his mother, who is not employed.  His father   is absent.    GENERAL REVIEW OF SYSTEMS:  Shows otherwise normal constitution, head, eyes,   ears, nose, throat, mouth, heart, lungs, GI, , skin, musculoskeletal,   neurologic, psychiatric, endocrine, hematologic and immune function.    PHYSICAL EXAMINATION:  VITAL SIGNS:  Weight 43.05 kilograms, height 155.5 cm, blood pressure 98/62.  GENERAL:  Normal body habitus.  He is uncooperative and does not speak.  NEUROLOGIC:  He has normal eye movements and symmetrical facial movements.  His   reflexes are 2+ throughout.  He has a shuffling symmetrical gait with no ataxia   or intention tremor.    In summary, Lamont Villarreal has had only two very brief seizures in the last seven   months.  His mother would like to continue his current medications as noted   above and I have renewed all of these and asked that he return in six months, or   sooner if need be.    Sincerely,      TYLER  dd: 10/12/2017 14:02:02 (CDT)  td: 10/13/2017 06:40:37 (CDT)  Doc ID   #[id number]  Job ID #[id number]    CC:     This office note has been dictated.

## 2017-10-18 ENCOUNTER — TELEPHONE (OUTPATIENT)
Dept: PEDIATRIC NEUROLOGY | Facility: CLINIC | Age: 22
End: 2017-10-18

## 2017-10-18 NOTE — TELEPHONE ENCOUNTER
----- Message from Lawanda Jackson sent at 10/18/2017  8:23 AM CDT -----  Contact: Davina [phone] or [phone]  Davina calling to see if Dr Ortez received pt home bound forms and when will they be fax back to her. Please call to advise. Thank you.

## 2017-10-18 NOTE — TELEPHONE ENCOUNTER
Spoke with Davina, I told her that I faxed the forms back to the fax number on 10/10/17.  She thinks they might have gotten lost.  She will give us a call back, once she checks again.

## 2018-04-09 ENCOUNTER — TELEPHONE (OUTPATIENT)
Dept: PEDIATRIC NEUROLOGY | Facility: CLINIC | Age: 23
End: 2018-04-09

## 2018-04-09 NOTE — TELEPHONE ENCOUNTER
----- Message from Saniya Munoz sent at 4/9/2018  1:18 PM CDT -----  Contact: Luli Arvizu  [phone]  Mom states Pt need refill on Pt medications  (clonazePAM (KLONOPIN) 0.5 MG tablet) ,(phenobarbital (LUMINAL) 64.8 MG tablet) and(diazePAM (VALIUM) 10 MG Tab).

## 2018-04-09 NOTE — TELEPHONE ENCOUNTER
appt was rescheduled to 4/19/18. Mother is requesting refills of medications. Please advise; thank you.

## 2018-04-11 ENCOUNTER — TELEPHONE (OUTPATIENT)
Dept: PEDIATRIC NEUROLOGY | Facility: CLINIC | Age: 23
End: 2018-04-11

## 2018-04-11 NOTE — TELEPHONE ENCOUNTER
----- Message from Rafaela Greenwood sent at 4/11/2018  9:40 AM CDT -----  [place] [facility] [place] [phone]  [phone]-----Calling about the pt refills that was requested a few days ago.Requesting a call back

## 2018-04-11 NOTE — TELEPHONE ENCOUNTER
MA telephone mom  Mom informs me pt needs refill of Klonopin,Valium,Phenobarb until appt scheduled on 4/19  NA informs mom she will send message to MD and call in medication  Mom voiced understanding

## 2018-05-04 ENCOUNTER — TELEPHONE (OUTPATIENT)
Dept: PEDIATRIC NEUROLOGY | Facility: CLINIC | Age: 23
End: 2018-05-04

## 2018-05-07 ENCOUNTER — TELEPHONE (OUTPATIENT)
Dept: PEDIATRIC NEUROLOGY | Facility: CLINIC | Age: 23
End: 2018-05-07

## 2018-05-07 NOTE — TELEPHONE ENCOUNTER
MA telephone mom to help schedule pt f/u appt  MA offer 7/30@1:20  Mom voiced time and date   MA sent appt reminder via mail to address on file  MA informed mom she will send in refills for pt meds until  returns to clinic  Mom voiced understanding

## 2018-05-07 NOTE — TELEPHONE ENCOUNTER
----- Message from Lawanda Jackson sent at 5/7/2018  1:51 PM CDT -----  Needs Medical Advice     Who Called: Mom  Communication Preference:Call Back #[phone] or [phone] timeframe--- ASAP   Additional Information:  Mom calling to schedule an adult Pt appointment t and not to sure who to schedule with sincew DR. Ortez will be out for 3 months.Please call to advise.

## 2018-06-20 ENCOUNTER — TELEPHONE (OUTPATIENT)
Dept: PEDIATRIC NEUROLOGY | Facility: CLINIC | Age: 23
End: 2018-06-20

## 2018-06-20 NOTE — TELEPHONE ENCOUNTER
----- Message from Rafaela Greenwood sent at 6/20/2018 10:53 AM CDT -----  Contact: MOM --[phone]  Needs Advice    Reason for call: The pharmacy don't have the pt---clonazePAM (KLONOPIN) 0.5 MG tablet and Mom wants to know if the pt can have the the higher dose and just take half      Communication Preference:  Additional Information: Requesting a call back

## 2018-06-20 NOTE — TELEPHONE ENCOUNTER
MA telephone mom to inform her I have refill Lamont meds until appt on July30  Mom voiced understanding

## 2018-08-03 ENCOUNTER — TELEPHONE (OUTPATIENT)
Dept: PEDIATRIC NEUROLOGY | Facility: CLINIC | Age: 23
End: 2018-08-03

## 2018-08-03 NOTE — TELEPHONE ENCOUNTER
----- Message from Rafaela Greenwood sent at 8/2/2018  2:39 PM CDT -----  [place] [facility] [place] [phone]  Rx Refill/Request     Is this a Refill   Rx Name and Strength:  diazePAM (VALIUM) 10 MG Tab      Preferred Pharmacy with phone number: [facility]/pharmacy #[id number] - [place], [place] - [address] [address] [facility] [phone] (Phone)  [phone] (Fax)      Communication Preference:Requesting a call back  Additional Information:

## 2018-08-03 NOTE — TELEPHONE ENCOUNTER
Telephoned mom to ask is Lamont needs Klonopin and phenobarb as well as the Valium  Mom sates she does not know  I informed mom I will call the pharmacy to find out   Mom voiced understanding

## 2018-08-06 ENCOUNTER — TELEPHONE (OUTPATIENT)
Dept: PEDIATRIC NEUROLOGY | Facility: CLINIC | Age: 23
End: 2018-08-06

## 2018-08-06 NOTE — TELEPHONE ENCOUNTER
Telephoned mom and left message to inform her I have refilled [facility] meds            I telephoned pharmacy Klonopin disp 8 until appt  And Valium disp 24 until appt

## 2018-08-21 ENCOUNTER — OFFICE VISIT (OUTPATIENT)
Dept: PEDIATRIC NEUROLOGY | Facility: CLINIC | Age: 23
End: 2018-08-21
Payer: MEDICAID

## 2018-08-21 VITALS
WEIGHT: 90.19 LBS | DIASTOLIC BLOOD PRESSURE: 69 MMHG | HEIGHT: 61 IN | SYSTOLIC BLOOD PRESSURE: 103 MMHG | HEART RATE: 68 BPM | BODY MASS INDEX: 17.03 KG/M2

## 2018-08-21 DIAGNOSIS — G40.109 PARTIAL SYMPTOMATIC EPILEPSY WITH SIMPLE PARTIAL SEIZURES, NOT INTRACTABLE, WITHOUT STATUS EPILEPTICUS: ICD-10-CM

## 2018-08-21 DIAGNOSIS — R56.9 CONVULSIONS, UNSPECIFIED CONVULSION TYPE: ICD-10-CM

## 2018-08-21 DIAGNOSIS — G40.209 PARTIAL SYMPTOMATIC EPILEPSY WITH COMPLEX PARTIAL SEIZURES, NOT INTRACTABLE, WITHOUT STATUS EPILEPTICUS: ICD-10-CM

## 2018-08-21 DIAGNOSIS — G40.909 SEIZURE DISORDER: Primary | ICD-10-CM

## 2018-08-21 DIAGNOSIS — F84.0 AUTISM: ICD-10-CM

## 2018-08-21 DIAGNOSIS — G40.201 PARTIAL SYMPTOMATIC EPILEPSY WITH COMPLEX PARTIAL SEIZURES, NOT INTRACTABLE, WITH STATUS EPILEPTICUS: ICD-10-CM

## 2018-08-21 DIAGNOSIS — R62.50 DEVELOPMENTAL DELAY: ICD-10-CM

## 2018-08-21 PROCEDURE — 99999 PR PBB SHADOW E&M-EST. PATIENT-LVL III: CPT | Mod: PBBFAC,,, | Performed by: PSYCHIATRY & NEUROLOGY

## 2018-08-21 PROCEDURE — 99213 OFFICE O/P EST LOW 20 MIN: CPT | Mod: PBBFAC | Performed by: PSYCHIATRY & NEUROLOGY

## 2018-08-21 PROCEDURE — 99214 OFFICE O/P EST MOD 30 MIN: CPT | Mod: S$PBB,,, | Performed by: PSYCHIATRY & NEUROLOGY

## 2018-08-21 RX ORDER — CLONAZEPAM 0.5 MG/1
0.5 TABLET ORAL NIGHTLY
Qty: 30 TABLET | Refills: 5 | Status: SHIPPED | OUTPATIENT
Start: 2018-08-21 | End: 2019-03-11 | Stop reason: SDUPTHER

## 2018-08-21 RX ORDER — DIAZEPAM 10 MG/1
TABLET ORAL
Qty: 90 TABLET | Refills: 5 | Status: SHIPPED | OUTPATIENT
Start: 2018-08-21 | End: 2019-03-11 | Stop reason: SDUPTHER

## 2018-08-21 RX ORDER — TOPIRAMATE 200 MG/1
200 TABLET ORAL 2 TIMES DAILY
Qty: 60 TABLET | Refills: 5 | Status: SHIPPED | OUTPATIENT
Start: 2018-08-21 | End: 2019-03-11 | Stop reason: SDUPTHER

## 2018-08-21 RX ORDER — LAMOTRIGINE 200 MG/1
200 TABLET ORAL 2 TIMES DAILY
Qty: 60 TABLET | Refills: 5 | Status: SHIPPED | OUTPATIENT
Start: 2018-08-21 | End: 2019-03-11 | Stop reason: SDUPTHER

## 2018-08-21 RX ORDER — PHENOBARBITAL 64.8 MG/1
TABLET ORAL
Qty: 90 TABLET | Refills: 5 | Status: SHIPPED | OUTPATIENT
Start: 2018-08-21 | End: 2019-03-11 | Stop reason: SDUPTHER

## 2018-08-21 NOTE — PROGRESS NOTES
August 21, 2018    Hollis Thomas M.D.  [address] [place]  [address]    Dear Dr. Thomas:    I saw Lamont Villarreal in followup at [facility] on 08/21/2018.  This is a 23-year-old   male with autism, developmental delay and a seizure disorder.  I have been   seeing him for a number of years.  His seizures still occur about once every two   to three months, which his mother says this is as well as he has ever done.    These are very brief (1 minute) generalized clonic events.  He is on several   medications:  Lamictal 200 mg twice daily, Topamax 200 mg twice daily, diazepam   10 mg three times daily, clonazepam 0.5 mg at bedtime and phenobarbital 64.8 mg   tablets three daily.  His levels were good and his lab work was normal at his   last visit.  His mother is very happy with his current situation.  He has had no   other intercurrent illness, surgery, medication, allergy or injury since seen   in October.    Immunizations are up-to-date.  He does not talk.  No family history of   neurologic disease.  He lives with his mother in [place].    GENERAL REVIEW OF SYSTEMS:  Shows otherwise normal constitution, head, eyes,   ears, nose, throat, mouth, heart, lungs, GI, , skin, musculoskeletal,   neurologic, psychiatric, endocrine, hematologic and immune function.    PHYSICAL EXAMINATION:  VITAL SIGNS:  Weight 40.90 kg, height 5 feet 1.22 inches, blood pressure 103/69.  GENERAL:  Thin body habitus.  He is quite uncooperative.  NEUROLOGIC:  He has full lateral eye movements and symmetrical facial movements.    Deep tendon reflexes are 2+ throughout.  He moves his limbs symmetrically and   walks with a somewhat stooped gait.    I have continued his current medications and asked that he return to clinic in   six months.    Sincerely,    Yvan Ortez M.D.      TYLER  dd: 08/21/2018 14:36:51 (CDT)  td: 08/22/2018 00:34:46 (CDT)  Doc ID   #[id number]  Job ID #[id number]    CC:     This office note has been dictated.

## 2019-01-30 ENCOUNTER — TELEPHONE (OUTPATIENT)
Dept: PEDIATRIC NEUROLOGY | Facility: CLINIC | Age: 24
End: 2019-01-30

## 2019-01-30 NOTE — TELEPHONE ENCOUNTER
----- Message from Saniya Munoz sent at 1/30/2019  4:09 PM CST -----  Contact: Luli Arvizu  [phone]  Rx Refill/Request     Is this a Refill or New Rx:yes    Rx Name and Strength:clonazePAM (KLONOPIN) 0.5 MG tablet)PHENobarbital (LUMINAL) 64.8 MG tablet) diazePAM (VALIUM) 10 MG Tab)   Preferred Pharmacy with phone number:[facility]/pharmacy #[id number] - [place], [place] - [address] [facility]  [phone] (Phone)  [phone] (Fax)   Communication Preference:Mom requesting a call back    Additional Information:Mom made a appointment for 2/12/  states Pt will be out of medication

## 2019-01-30 NOTE — TELEPHONE ENCOUNTER
Patient has follow up appt 2/12/2019. Contacted [facility] pharmacy and refilled each requested medication. 14 day supply called in to get patient to appt.

## 2019-02-12 ENCOUNTER — TELEPHONE (OUTPATIENT)
Dept: PEDIATRIC NEUROLOGY | Facility: CLINIC | Age: 24
End: 2019-02-12

## 2019-02-12 NOTE — TELEPHONE ENCOUNTER
----- Message from Ari Berumen sent at 2/12/2019 12:15 PM CST -----  Contact: Mom [phone]  Needs Advice    Reason for call: reschedule appt        Communication Preference: Mom [phone]    Additional Information:  Mom called to reschedule pt's appt. She would like a call back when possible.

## 2019-03-08 ENCOUNTER — TELEPHONE (OUTPATIENT)
Dept: PEDIATRIC NEUROLOGY | Facility: CLINIC | Age: 24
End: 2019-03-08

## 2019-03-08 NOTE — TELEPHONE ENCOUNTER
Telephoned mom to schedule f/u appt  I offered Monday 2/11@1p  Mom accept and voiced appt time and date

## 2019-03-08 NOTE — TELEPHONE ENCOUNTER
----- Message from Antonette Yap sent at 3/8/2019  9:56 AM CST -----  Contact: Mom [phone]  Reason for call: PHENobarbital (LUMINAL) 64.8 MG tablet                            diazePAM (VALIUM) 10 MG Tab                            clonazePAM (KLONOPIN) 0.5 MG tablet                            topiramate (TOPAMAX) 200 MG Tab                            lamoTRIgine (LAMICTAL) 200 MG tablet        Communication Preference: Mom [phone]    Additional Information: Mom is requesting a call back to schedule patient a follow up appt. She stated that depending on how far patient's appt will be she will need a refill on all of the above medications to last him.

## 2019-03-11 ENCOUNTER — OFFICE VISIT (OUTPATIENT)
Dept: PEDIATRIC NEUROLOGY | Facility: CLINIC | Age: 24
End: 2019-03-11
Payer: MEDICAID

## 2019-03-11 VITALS — HEART RATE: 103 BPM | BODY MASS INDEX: 17.12 KG/M2 | WEIGHT: 93.06 LBS | HEIGHT: 62 IN

## 2019-03-11 DIAGNOSIS — G40.909 SEIZURE DISORDER: Primary | ICD-10-CM

## 2019-03-11 DIAGNOSIS — G40.109 PARTIAL SYMPTOMATIC EPILEPSY WITH SIMPLE PARTIAL SEIZURES, NOT INTRACTABLE, WITHOUT STATUS EPILEPTICUS: ICD-10-CM

## 2019-03-11 DIAGNOSIS — R56.9 CONVULSIONS, UNSPECIFIED CONVULSION TYPE: ICD-10-CM

## 2019-03-11 DIAGNOSIS — G40.201 PARTIAL SYMPTOMATIC EPILEPSY WITH COMPLEX PARTIAL SEIZURES, NOT INTRACTABLE, WITH STATUS EPILEPTICUS: ICD-10-CM

## 2019-03-11 DIAGNOSIS — F84.0 AUTISM: ICD-10-CM

## 2019-03-11 DIAGNOSIS — G40.209 PARTIAL SYMPTOMATIC EPILEPSY WITH COMPLEX PARTIAL SEIZURES, NOT INTRACTABLE, WITHOUT STATUS EPILEPTICUS: ICD-10-CM

## 2019-03-11 PROCEDURE — 99999 PR PBB SHADOW E&M-EST. PATIENT-LVL III: ICD-10-PCS | Mod: PBBFAC,,, | Performed by: PSYCHIATRY & NEUROLOGY

## 2019-03-11 PROCEDURE — 99214 OFFICE O/P EST MOD 30 MIN: CPT | Mod: S$PBB,,, | Performed by: PSYCHIATRY & NEUROLOGY

## 2019-03-11 PROCEDURE — 99213 OFFICE O/P EST LOW 20 MIN: CPT | Mod: PBBFAC | Performed by: PSYCHIATRY & NEUROLOGY

## 2019-03-11 PROCEDURE — 99999 PR PBB SHADOW E&M-EST. PATIENT-LVL III: CPT | Mod: PBBFAC,,, | Performed by: PSYCHIATRY & NEUROLOGY

## 2019-03-11 PROCEDURE — 99214 PR OFFICE/OUTPT VISIT, EST, LEVL IV, 30-39 MIN: ICD-10-PCS | Mod: S$PBB,,, | Performed by: PSYCHIATRY & NEUROLOGY

## 2019-03-11 RX ORDER — PHENOBARBITAL 64.8 MG/1
TABLET ORAL
Qty: 90 TABLET | Refills: 5 | Status: SHIPPED | OUTPATIENT
Start: 2019-03-11 | End: 2019-09-12 | Stop reason: SDUPTHER

## 2019-03-11 RX ORDER — TOPIRAMATE 200 MG/1
200 TABLET ORAL 2 TIMES DAILY
Qty: 60 TABLET | Refills: 5 | Status: SHIPPED | OUTPATIENT
Start: 2019-03-11 | End: 2019-09-12 | Stop reason: SDUPTHER

## 2019-03-11 RX ORDER — DIAZEPAM 10 MG/1
TABLET ORAL
Qty: 90 TABLET | Refills: 5 | Status: SHIPPED | OUTPATIENT
Start: 2019-03-11 | End: 2019-09-12 | Stop reason: SDUPTHER

## 2019-03-11 RX ORDER — TOPIRAMATE 200 MG/1
200 TABLET ORAL 2 TIMES DAILY
Qty: 60 TABLET | Refills: 5 | Status: SHIPPED | OUTPATIENT
Start: 2019-03-11 | End: 2019-03-11 | Stop reason: SDUPTHER

## 2019-03-11 RX ORDER — CLONAZEPAM 0.5 MG/1
0.5 TABLET ORAL NIGHTLY
Qty: 30 TABLET | Refills: 5 | Status: SHIPPED | OUTPATIENT
Start: 2019-03-11 | End: 2019-09-12 | Stop reason: SDUPTHER

## 2019-03-11 RX ORDER — LAMOTRIGINE 200 MG/1
200 TABLET ORAL 2 TIMES DAILY
Qty: 60 TABLET | Refills: 5 | Status: SHIPPED | OUTPATIENT
Start: 2019-03-11 | End: 2019-09-12 | Stop reason: SDUPTHER

## 2019-03-11 NOTE — PROGRESS NOTES
03/11/2019    Hollis Thomas M.D.  [address] [place]  [address]    RE:  LAMONT VILLARREAL  [facility]  [id number]    Dear Dr. Thomas:    I saw Lamont Villarreal in followup at [facility] on 03/11/2019.  He was last seen in   August for his autism and severe developmental delay and difficult epilepsy.  He   has been stable, one seizure about every two months, generalized clonic and   lasting only about a minute.  He is on a lot of medication, but his mother is   very happy with this:  Lamictal 200 mg twice daily, Topamax 200 mg twice daily,   diazepam 10 mg three times daily and clonazepam 0.5 mg at bedtime.  He is also   on phenobarbital 64.8 mg tablets, three daily.  His levels have been good.  He   has had no intercurrent illness, surgery, medication, allergy or injury.  He   lives at home with his mother.  His father is not involved.  There is no family   history of neurologic disease.    GENERAL REVIEW OF SYSTEMS:  Shows otherwise normal constitution, head, eyes,   ears, nose, throat, mouth, heart, lungs, GI, , skin, musculoskeletal,   neurologic, psychiatric, endocrine, hematologic and immune function.    PHYSICAL EXAMINATION:  VITAL SIGNS:  Weight 42.20 kilograms, height 158 cm, pulse 103.  GENERAL:  He is not cooperative and he is nonverbal.  NEUROLOGIC:  He walks in a somewhat stooped fashion with his arms flexed, but   there is no ataxia.  He has full lateral eye movements.  His deep tendon   reflexes are 2+.  He is not cooperative for the examination and would not sit on   the table.    In summary, Lamont Villarreal's epilepsy seems as well controlled as it ever has been   and I have continued his current medications and asked him to return to clinic   in six months.    Sincerely,      TYLER  dd: 03/11/2019 13:35:14 (CDT)  td: 03/12/2019 04:22:41 (CDT)  Doc ID   #[id number]  Job ID #[id number]    CC:     This office note has been dictated.

## 2019-04-08 ENCOUNTER — TELEPHONE (OUTPATIENT)
Dept: PEDIATRIC NEUROLOGY | Facility: CLINIC | Age: 24
End: 2019-04-08

## 2019-05-09 ENCOUNTER — TELEPHONE (OUTPATIENT)
Dept: PEDIATRIC NEUROLOGY | Facility: CLINIC | Age: 24
End: 2019-05-09

## 2019-09-04 ENCOUNTER — TELEPHONE (OUTPATIENT)
Dept: PEDIATRIC NEUROLOGY | Facility: CLINIC | Age: 24
End: 2019-09-04

## 2019-09-04 NOTE — TELEPHONE ENCOUNTER
----- Message from Mila Rosado sent at 9/4/2019  3:25 PM CDT -----  Contact: Mom-- [phone]  Type:  Needs Medical Advice    Who Called:  Mom    Symptoms (please be specific): clonazePAM (KLONOPIN) 0.5 MG tablet    diazePAM (VALIUM) 10 MG Tab    Pharmacy name and phone #:   [facility]/pharmacy #[id number] - [place], [place] - [address] [facility] [phone] (Phone)  [phone] (Fax)    Would the patient rather a call back or a response via [facility]? Call    Best Call Back Number:  [phone]    Additional Information: Mom states pt will run out of medication before appt. She is requesting a refill till appt. She is requesting a call back.

## 2019-09-11 ENCOUNTER — TELEPHONE (OUTPATIENT)
Dept: PEDIATRIC NEUROLOGY | Facility: CLINIC | Age: 24
End: 2019-09-11

## 2019-09-11 NOTE — TELEPHONE ENCOUNTER
Telephoned mom to schedule f/u appt  I offered 10/31@240p  Mom accept and voiced appt time and date

## 2019-09-11 NOTE — TELEPHONE ENCOUNTER
----- Message from Michelle Banks sent at 9/11/2019  1:57 PM CDT -----  Type:  Needs Medical Advice    Who Called: MOM     Would the patient rather a call back or a response via [facility]? CALL BACK     Best Call Back Number: [phone]    Additional Information: MOM WOULD LIKE TO SCHEDULE THE PT AN APPT.     This pt also needs the following medication refilled PHENobarbital (LUMINAL) 64.8 MG tablet, diazePAM (VALIUM) 10 MG Tab &  clonazePAM (KLONOPIN) 0.5 MG tablet.     Mom would like them sent to the pharmacy at [facility]/pharmacy #[id number] - [place], [place] - [address] [facility] [phone] (Phone)  [phone] (Fax)

## 2019-09-12 ENCOUNTER — OFFICE VISIT (OUTPATIENT)
Dept: PEDIATRIC NEUROLOGY | Facility: CLINIC | Age: 24
End: 2019-09-12
Payer: MEDICAID

## 2019-09-12 ENCOUNTER — LAB VISIT (OUTPATIENT)
Dept: LAB | Facility: HOSPITAL | Age: 24
End: 2019-09-12
Attending: PSYCHIATRY & NEUROLOGY
Payer: MEDICAID

## 2019-09-12 VITALS
WEIGHT: 90.81 LBS | BODY MASS INDEX: 16.71 KG/M2 | SYSTOLIC BLOOD PRESSURE: 120 MMHG | DIASTOLIC BLOOD PRESSURE: 80 MMHG | HEIGHT: 62 IN | HEART RATE: 75 BPM

## 2019-09-12 DIAGNOSIS — G40.909 SEIZURE DISORDER: ICD-10-CM

## 2019-09-12 DIAGNOSIS — R56.9 CONVULSIONS, UNSPECIFIED CONVULSION TYPE: ICD-10-CM

## 2019-09-12 DIAGNOSIS — M25.512 LEFT SHOULDER PAIN, UNSPECIFIED CHRONICITY: Primary | ICD-10-CM

## 2019-09-12 DIAGNOSIS — G40.201 PARTIAL SYMPTOMATIC EPILEPSY WITH COMPLEX PARTIAL SEIZURES, NOT INTRACTABLE, WITH STATUS EPILEPTICUS: ICD-10-CM

## 2019-09-12 DIAGNOSIS — G40.209 PARTIAL SYMPTOMATIC EPILEPSY WITH COMPLEX PARTIAL SEIZURES, NOT INTRACTABLE, WITHOUT STATUS EPILEPTICUS: ICD-10-CM

## 2019-09-12 DIAGNOSIS — G40.109 PARTIAL SYMPTOMATIC EPILEPSY WITH SIMPLE PARTIAL SEIZURES, NOT INTRACTABLE, WITHOUT STATUS EPILEPTICUS: ICD-10-CM

## 2019-09-12 LAB
ANION GAP SERPL CALC-SCNC: 10 MMOL/L (ref 8–16)
BASOPHILS # BLD AUTO: 0.04 K/UL (ref 0–0.2)
BASOPHILS NFR BLD: 0.5 % (ref 0–1.9)
BUN SERPL-MCNC: 13 MG/DL (ref 6–20)
CALCIUM SERPL-MCNC: 9.1 MG/DL (ref 8.7–10.5)
CHLORIDE SERPL-SCNC: 110 MMOL/L (ref 95–110)
CO2 SERPL-SCNC: 22 MMOL/L (ref 23–29)
CREAT SERPL-MCNC: 0.9 MG/DL (ref 0.5–1.4)
DIFFERENTIAL METHOD: ABNORMAL
EOSINOPHIL # BLD AUTO: 0.8 K/UL (ref 0–0.5)
EOSINOPHIL NFR BLD: 11.1 % (ref 0–8)
ERYTHROCYTE [DISTWIDTH] IN BLOOD BY AUTOMATED COUNT: 13.6 % (ref 11.5–14.5)
EST. GFR  (AFRICAN AMERICAN): >60 ML/MIN/1.73 M^2
EST. GFR  (NON AFRICAN AMERICAN): >60 ML/MIN/1.73 M^2
GLUCOSE SERPL-MCNC: 70 MG/DL (ref 70–110)
HCT VFR BLD AUTO: 42.3 % (ref 40–54)
HGB BLD-MCNC: 13.9 G/DL (ref 14–18)
LYMPHOCYTES # BLD AUTO: 2.9 K/UL (ref 1–4.8)
LYMPHOCYTES NFR BLD: 38.7 % (ref 18–48)
MCH RBC QN AUTO: 30.5 PG (ref 27–31)
MCHC RBC AUTO-ENTMCNC: 32.9 G/DL (ref 32–36)
MCV RBC AUTO: 93 FL (ref 82–98)
MONOCYTES # BLD AUTO: 0.7 K/UL (ref 0.3–1)
MONOCYTES NFR BLD: 9.2 % (ref 4–15)
NEUTROPHILS # BLD AUTO: 3.1 K/UL (ref 1.8–7.7)
NEUTROPHILS NFR BLD: 40.5 % (ref 38–73)
PHENOBARB SERPL-MCNC: 61.1 UG/DL (ref 15–40)
PLATELET # BLD AUTO: 184 K/UL (ref 150–350)
PMV BLD AUTO: 10 FL (ref 9.2–12.9)
POTASSIUM SERPL-SCNC: 3.6 MMOL/L (ref 3.5–5.1)
RBC # BLD AUTO: 4.55 M/UL (ref 4.6–6.2)
SODIUM SERPL-SCNC: 142 MMOL/L (ref 136–145)
WBC # BLD AUTO: 7.59 K/UL (ref 3.9–12.7)

## 2019-09-12 PROCEDURE — 80175 DRUG SCREEN QUAN LAMOTRIGINE: CPT

## 2019-09-12 PROCEDURE — 99999 PR PBB SHADOW E&M-EST. PATIENT-LVL IV: ICD-10-PCS | Mod: PBBFAC,,, | Performed by: PSYCHIATRY & NEUROLOGY

## 2019-09-12 PROCEDURE — 36415 COLL VENOUS BLD VENIPUNCTURE: CPT

## 2019-09-12 PROCEDURE — 80184 ASSAY OF PHENOBARBITAL: CPT

## 2019-09-12 PROCEDURE — 99214 OFFICE O/P EST MOD 30 MIN: CPT | Mod: S$PBB,,, | Performed by: PSYCHIATRY & NEUROLOGY

## 2019-09-12 PROCEDURE — 99214 OFFICE O/P EST MOD 30 MIN: CPT | Mod: PBBFAC | Performed by: PSYCHIATRY & NEUROLOGY

## 2019-09-12 PROCEDURE — 99214 PR OFFICE/OUTPT VISIT, EST, LEVL IV, 30-39 MIN: ICD-10-PCS | Mod: S$PBB,,, | Performed by: PSYCHIATRY & NEUROLOGY

## 2019-09-12 PROCEDURE — 85025 COMPLETE CBC W/AUTO DIFF WBC: CPT

## 2019-09-12 PROCEDURE — 80048 BASIC METABOLIC PNL TOTAL CA: CPT

## 2019-09-12 PROCEDURE — 80201 ASSAY OF TOPIRAMATE: CPT

## 2019-09-12 PROCEDURE — 99999 PR PBB SHADOW E&M-EST. PATIENT-LVL IV: CPT | Mod: PBBFAC,,, | Performed by: PSYCHIATRY & NEUROLOGY

## 2019-09-12 RX ORDER — LAMOTRIGINE 200 MG/1
200 TABLET ORAL 2 TIMES DAILY
Qty: 60 TABLET | Refills: 5 | Status: SHIPPED | OUTPATIENT
Start: 2019-09-12 | End: 2020-05-05 | Stop reason: SDUPTHER

## 2019-09-12 RX ORDER — DIAZEPAM 10 MG/1
TABLET ORAL
Qty: 90 TABLET | Refills: 5 | Status: SHIPPED | OUTPATIENT
Start: 2019-09-12 | End: 2020-05-05 | Stop reason: SDUPTHER

## 2019-09-12 RX ORDER — PHENOBARBITAL 64.8 MG/1
TABLET ORAL
Qty: 90 TABLET | Refills: 5 | Status: SHIPPED | OUTPATIENT
Start: 2019-09-12 | End: 2020-05-05 | Stop reason: SDUPTHER

## 2019-09-12 RX ORDER — CLONAZEPAM 0.5 MG/1
0.5 TABLET ORAL NIGHTLY
Qty: 30 TABLET | Refills: 5 | Status: SHIPPED | OUTPATIENT
Start: 2019-09-12 | End: 2020-05-05 | Stop reason: SDUPTHER

## 2019-09-12 RX ORDER — TOPIRAMATE 200 MG/1
200 TABLET ORAL 2 TIMES DAILY
Qty: 60 TABLET | Refills: 5 | Status: SHIPPED | OUTPATIENT
Start: 2019-09-12 | End: 2020-05-05 | Stop reason: SDUPTHER

## 2019-09-12 NOTE — PROGRESS NOTES
September 12, 2019    Hollis Thomas M.D.  [address] [place] [address]  [place] [address]    RE:  LAMONT VILLARREAL  [facility]  [id number]    Dear Dr. Thomas:    I saw Lamont Villarreal today at [facility] in followup for his seizures and mental   deficiency.  His seizures are under best control ever with one brief generalized   1 minute seizure about every two months.  In the past, his EEGs have shown   slowing and also generalized spike and wave activity and I am told he has had   normal imaging studies in the past.  He is taking phenobarbital 64.8 mg three   daily, Lamictal 200 mg twice daily, Topamax 200 mg twice daily, Valium 10 mg   three times daily, and clonazepam 0.5 mg at bedtime.  His mother is happy with   current seizure control.  She states that for the last few months, he seems to   have pain in his left shoulder and that he will not raise it.  No other   intercurrent illness, surgery, medication, allergy or injury.  No family history   of epilepsy.  He lives with his mother.    GENERAL REVIEW OF SYSTEMS:  Shows otherwise normal constitution, head, eyes,   ears, nose, throat, mouth, heart, lungs, GI, , skin, musculoskeletal,   neurologic, psychiatric, endocrine, hematologic and immune function.    PHYSICAL EXAMINATION:  VITAL SIGNS:  Weight 41.20 kg, height 157 cm, and blood pressure 120/80.  GENERAL:  Normal body habitus.  He is nonverbal.  He is not really cooperative   for the examination.  HEAD, EYES, EARS, NOSE AND THROAT:  Appear normal.  NECK:  Supple.  No mass.  CHEST:  Clear, no murmurs.  NEUROLOGIC:  He does seem to have some restriction of movement in his left   shoulder.  His gait is symmetrical and awkward.  He reaches for objects   symmetrically with both hands.    I have referred him to Orthopedics regarding his shoulder.  I have continued his   current medications and ordered a CBC, basic metabolic panel and drug levels.    I will see him back in six months for  followup.    Sincerely,      TYLER  dd: 09/12/2019 12:13:21 (CDT)  td: 09/13/2019 03:19:16 (CDT)  Doc ID   #[id number]  Job ID #[id number]    CC:     This office note has been dictated.

## 2019-09-16 LAB
LAMOTRIGINE SERPL-MCNC: 4.3 UG/ML (ref 2–15)
TOPIRAMATE SERPL-MCNC: 5 MCG/ML

## 2020-02-18 ENCOUNTER — TELEPHONE (OUTPATIENT)
Dept: PEDIATRIC NEUROLOGY | Facility: CLINIC | Age: 25
End: 2020-02-18

## 2020-02-18 NOTE — TELEPHONE ENCOUNTER
----- Message from Janette Rodriguez sent at 2/17/2020  5:22 PM CST -----  Pt can be reached at [phone]    Mom is calling to request medication refill for the following meds:and is requesting an sooner appt for pt. Please   clonazePAM (KLONOPIN) 0.5 MG tablet     diazePAM (VALIUM) 10 MG Tab    lamoTRIgine (LAMICTAL) 200 MG tablet    PHENobarbital (LUMINAL) 64.8 MG tablet    topiramate (TOPAMAX) 200 MG Tab     [facility]     Phone [phone]    Thank you!

## 2020-03-03 ENCOUNTER — TELEPHONE (OUTPATIENT)
Dept: PEDIATRIC NEUROLOGY | Facility: CLINIC | Age: 25
End: 2020-03-03

## 2020-03-03 NOTE — TELEPHONE ENCOUNTER
----- Message from Randi Greenwood sent at 3/3/2020 11:10 AM CST -----  Contact: Mom [phone]  Would like to receive medical advice.    Would they like a call back or a response via [facility]:  Call back     Additional information:  Calling to speak to the nurse regarding the pt needing medication before follow up appt in May. Mom is requesting a call back with advice.

## 2020-03-03 NOTE — TELEPHONE ENCOUNTER
Spoke with mom, asked what meds needed refilled  ,mom says all 5   I informed mom I will call in enough to get Edi to appt  Mom voiced understanding

## 2020-03-18 ENCOUNTER — TELEPHONE (OUTPATIENT)
Dept: PEDIATRIC NEUROLOGY | Facility: CLINIC | Age: 25
End: 2020-03-18

## 2020-03-18 NOTE — TELEPHONE ENCOUNTER
----- Message from Randi Greenwood sent at 3/18/2020  8:41 AM CDT -----  Contact: Mom [phone]  Prescription refill request.    RX name and strength :   PHENobarbital (LUMINAL) 64.8 MG tablet    Local pharmacy or mail order pharmacy:  Local     Pharmacy name and phone # : [id number] - [place], [place] - [address] [facility]       Additional information:   Refill request

## 2020-03-18 NOTE — TELEPHONE ENCOUNTER
Spoke with mom to inform her I have sent in 3reills of phenobarb to get Edi to appt in May  Mom voiced understanding

## 2020-05-04 ENCOUNTER — TELEPHONE (OUTPATIENT)
Dept: PEDIATRIC NEUROLOGY | Facility: CLINIC | Age: 25
End: 2020-05-04

## 2020-05-04 NOTE — TELEPHONE ENCOUNTER
Phoned Mom. She would prefer a virtual visit, set up proxy and [facility] for Mom. Helped Mom set up MyChart on her phone and provided instructions to access virtual visit tomorrow. Also provided the [facility] and told her she could call us with any problems. Mom was very appreciative of the help. Visit changed to virtual.   ----- Message from Binta Mo sent at 5/4/2020  9:18 AM CDT -----  Contact: [phone]  Mom is requesting a call back.  Pt has an appointment tomorrow morning with the doctor.  Mom would like to be advised if she should wait for the virus to go away or should she still bring pt tomorrow.    Call back number: [phone]

## 2020-05-05 ENCOUNTER — OFFICE VISIT (OUTPATIENT)
Dept: PEDIATRIC NEUROLOGY | Facility: CLINIC | Age: 25
End: 2020-05-05
Payer: MEDICAID

## 2020-05-05 DIAGNOSIS — G40.109 PARTIAL SYMPTOMATIC EPILEPSY WITH SIMPLE PARTIAL SEIZURES, NOT INTRACTABLE, WITHOUT STATUS EPILEPTICUS: ICD-10-CM

## 2020-05-05 DIAGNOSIS — G40.201 PARTIAL SYMPTOMATIC EPILEPSY WITH COMPLEX PARTIAL SEIZURES, NOT INTRACTABLE, WITH STATUS EPILEPTICUS: ICD-10-CM

## 2020-05-05 DIAGNOSIS — G40.209 PARTIAL SYMPTOMATIC EPILEPSY WITH COMPLEX PARTIAL SEIZURES, NOT INTRACTABLE, WITHOUT STATUS EPILEPTICUS: ICD-10-CM

## 2020-05-05 DIAGNOSIS — G40.909 SEIZURE DISORDER: Primary | ICD-10-CM

## 2020-05-05 DIAGNOSIS — R56.9 CONVULSIONS, UNSPECIFIED CONVULSION TYPE: ICD-10-CM

## 2020-05-05 PROCEDURE — 99213 OFFICE O/P EST LOW 20 MIN: CPT | Mod: 95,,, | Performed by: PSYCHIATRY & NEUROLOGY

## 2020-05-05 PROCEDURE — 99213 PR OFFICE/OUTPT VISIT, EST, LEVL III, 20-29 MIN: ICD-10-PCS | Mod: 95,,, | Performed by: PSYCHIATRY & NEUROLOGY

## 2020-05-05 RX ORDER — LAMOTRIGINE 200 MG/1
200 TABLET ORAL 2 TIMES DAILY
Qty: 60 TABLET | Refills: 11 | Status: SHIPPED | OUTPATIENT
Start: 2020-05-05 | End: 2021-04-29 | Stop reason: SDUPTHER

## 2020-05-05 RX ORDER — CLONAZEPAM 0.5 MG/1
0.5 TABLET ORAL NIGHTLY
Qty: 30 TABLET | Refills: 5 | Status: SHIPPED | OUTPATIENT
Start: 2020-05-05 | End: 2021-01-05 | Stop reason: SDUPTHER

## 2020-05-05 RX ORDER — DIAZEPAM 10 MG/1
TABLET ORAL
Qty: 90 TABLET | Refills: 5 | Status: SHIPPED | OUTPATIENT
Start: 2020-05-05 | End: 2021-01-05 | Stop reason: SDUPTHER

## 2020-05-05 RX ORDER — PHENOBARBITAL 64.8 MG/1
TABLET ORAL
Qty: 90 TABLET | Refills: 5 | Status: SHIPPED | OUTPATIENT
Start: 2020-05-05 | End: 2021-01-05 | Stop reason: SDUPTHER

## 2020-05-05 RX ORDER — TOPIRAMATE 200 MG/1
200 TABLET ORAL 2 TIMES DAILY
Qty: 60 TABLET | Refills: 11 | Status: SHIPPED | OUTPATIENT
Start: 2020-05-05 | End: 2021-04-29 | Stop reason: SDUPTHER

## 2020-05-05 NOTE — PROGRESS NOTES
The patient location is: home  The chief complaint leading to consultation is: seizures  Visit type: audiovisual  Total time spent with patient:15 minutes  Each patient to whom he or she provides medical services by telemedicine is:  (1) informed of the relationship between the physician and patient and the respective role of any other health care provider with respect to management of the patient; and (2) notified that he or she may decline to receive medical services by telemedicine and may withdraw from such care at any time.    Notes:  This is a 25-year-old I have been seeing some time for mental impairment and epilepsy.  He has reportedly had normal imaging in the past.  His EEG showed generalized spike and wave activity.  His seizures are occurring rarely now, 1 every month or 2 lasting just for a minute.  His mother is quite happy with this level of control.  He has had no other inter current illness surgery medication allergy or injury.  There is no close family history of seizures.  He lives with his mother.  He has otherwise been well.    I have continued Lamont's current medications:  Phenobarbital 64.8 mg, 3 tablets at bedtime.  Topamax 200 mg twice daily, Valium 10 mg 3 times daily, clonazepam 0.5 mg at bedtime, and Lamictal 200 mg twice daily.  His mother will call for return appointment.---Yvan Ortez MD

## 2020-05-06 ENCOUNTER — TELEPHONE (OUTPATIENT)
Dept: PEDIATRIC NEUROLOGY | Facility: CLINIC | Age: 25
End: 2020-05-06

## 2020-05-06 NOTE — TELEPHONE ENCOUNTER
----- Message from Javier Hua MA sent at 5/5/2020  5:03 PM CDT -----  Contact: Mom [phone]      ----- Message -----  From: Allie Patel  Sent: 5/5/2020   4:51 PM CDT  To: [facility] Staff    Prescription refill request.    RX name and strength (copy/paste from chart):  clonazePAM (KLONOPIN) 0.5 MG tablet ,lamoTRIgine (LAMICTAL) 200 MG tablet diazePAM (VALIUM) 10 MG Tab,PHENobarbitaL (LUMINAL) 64.8 MG tablet and topiramate (TOPAMAX) 200 MG Tab     Is this a 30 day or 90 day RX:      Local pharmacy or mail order pharmacy:  Local    [facility]    Additional information: calling to have a refill on pt medications   clonazePAM (KLONOPIN) 0.5 MG tablet, diazePAM (VALIUM) 10 MG Tab, lamoTRIgine (LAMICTAL) 200 MG tablet, PHENobarbitaL (LUMINAL) 64.8 MG tablet and topiramate (TOPAMAX) 200 MG Tab. Mom states the pharmacy has not received refill and mom requesting a call back regarding refills on medications.

## 2020-05-06 NOTE — TELEPHONE ENCOUNTER
Telephoned mom to inform her all meds in the chart have been called into the pharmacy on file  Mom voiced understanding

## 2020-06-19 ENCOUNTER — TELEPHONE (OUTPATIENT)
Dept: PEDIATRIC NEUROLOGY | Facility: CLINIC | Age: 25
End: 2020-06-19

## 2020-06-19 NOTE — TELEPHONE ENCOUNTER
----- Message from Poonam Greenwood sent at 6/19/2020 12:11 PM CDT -----  Contact: Mother  Type:  Needs Medical Advice    Who Called:Mom     Would the patient rather a call back or a response via [facility]? Call back     Best Call Back Number: [phone]    Additional Information: Mom [phone]--------calling to get a refill on the pt medication PHENobarbitaL (LUMINAL) 64.8 MG tablet. Mom also states that she had a virtual visit with the provider and never received medication

## 2021-01-05 ENCOUNTER — OFFICE VISIT (OUTPATIENT)
Dept: NEUROLOGY | Facility: CLINIC | Age: 26
End: 2021-01-05
Payer: COMMERCIAL

## 2021-01-05 VITALS
WEIGHT: 93.5 LBS | HEIGHT: 60 IN | BODY MASS INDEX: 18.36 KG/M2 | TEMPERATURE: 98 F | DIASTOLIC BLOOD PRESSURE: 68 MMHG | RESPIRATION RATE: 14 BRPM | SYSTOLIC BLOOD PRESSURE: 102 MMHG | HEART RATE: 64 BPM

## 2021-01-05 DIAGNOSIS — M25.512 CHRONIC LEFT SHOULDER PAIN: ICD-10-CM

## 2021-01-05 DIAGNOSIS — G89.29 CHRONIC LEFT SHOULDER PAIN: ICD-10-CM

## 2021-01-05 DIAGNOSIS — F84.0 AUTISM DISORDER: ICD-10-CM

## 2021-01-05 DIAGNOSIS — Z79.899 HIGH RISK MEDICATION USE: ICD-10-CM

## 2021-01-05 DIAGNOSIS — G40.909 SEIZURE DISORDER: Primary | ICD-10-CM

## 2021-01-05 PROCEDURE — 99204 OFFICE O/P NEW MOD 45 MIN: CPT | Mod: S$GLB,,, | Performed by: PHYSICIAN ASSISTANT

## 2021-01-05 PROCEDURE — 3008F BODY MASS INDEX DOCD: CPT | Mod: CPTII,S$GLB,, | Performed by: PHYSICIAN ASSISTANT

## 2021-01-05 PROCEDURE — 99999 PR PBB SHADOW E&M-EST. PATIENT-LVL IV: ICD-10-PCS | Mod: PBBFAC,,, | Performed by: PHYSICIAN ASSISTANT

## 2021-01-05 PROCEDURE — 99999 PR PBB SHADOW E&M-EST. PATIENT-LVL IV: CPT | Mod: PBBFAC,,, | Performed by: PHYSICIAN ASSISTANT

## 2021-01-05 PROCEDURE — 99204 PR OFFICE/OUTPT VISIT, NEW, LEVL IV, 45-59 MIN: ICD-10-PCS | Mod: S$GLB,,, | Performed by: PHYSICIAN ASSISTANT

## 2021-01-05 PROCEDURE — 3008F PR BODY MASS INDEX (BMI) DOCUMENTED: ICD-10-PCS | Mod: CPTII,S$GLB,, | Performed by: PHYSICIAN ASSISTANT

## 2021-01-05 RX ORDER — PHENOBARBITAL 64.8 MG/1
TABLET ORAL
Qty: 90 TABLET | Refills: 5 | Status: SHIPPED | OUTPATIENT
Start: 2021-01-05 | End: 2021-01-18 | Stop reason: SDUPTHER

## 2021-01-05 RX ORDER — AMOXICILLIN AND CLAVULANATE POTASSIUM 875; 125 MG/1; MG/1
TABLET, FILM COATED ORAL
COMMUNITY
Start: 2021-01-04 | End: 2021-05-05

## 2021-01-05 RX ORDER — CLONAZEPAM 0.5 MG/1
0.5 TABLET ORAL NIGHTLY
Qty: 30 TABLET | Refills: 5 | Status: SHIPPED | OUTPATIENT
Start: 2021-01-05 | End: 2021-01-18 | Stop reason: SDUPTHER

## 2021-01-05 RX ORDER — DIAZEPAM 10 MG/1
TABLET ORAL
Qty: 90 TABLET | Refills: 5 | Status: SHIPPED | OUTPATIENT
Start: 2021-01-05 | End: 2021-01-18 | Stop reason: SDUPTHER

## 2021-04-29 DIAGNOSIS — G40.909 SEIZURE DISORDER: ICD-10-CM

## 2021-04-29 DIAGNOSIS — R56.9 CONVULSIONS, UNSPECIFIED CONVULSION TYPE: ICD-10-CM

## 2021-04-29 DIAGNOSIS — G40.201 PARTIAL SYMPTOMATIC EPILEPSY WITH COMPLEX PARTIAL SEIZURES, NOT INTRACTABLE, WITH STATUS EPILEPTICUS: ICD-10-CM

## 2021-04-29 DIAGNOSIS — G40.209 PARTIAL SYMPTOMATIC EPILEPSY WITH COMPLEX PARTIAL SEIZURES, NOT INTRACTABLE, WITHOUT STATUS EPILEPTICUS: ICD-10-CM

## 2021-04-29 RX ORDER — TOPIRAMATE 200 MG/1
200 TABLET ORAL 2 TIMES DAILY
Qty: 60 TABLET | Refills: 11 | Status: SHIPPED | OUTPATIENT
Start: 2021-04-29 | End: 2021-09-03 | Stop reason: SDUPTHER

## 2021-04-29 RX ORDER — LAMOTRIGINE 200 MG/1
200 TABLET ORAL 2 TIMES DAILY
Qty: 60 TABLET | Refills: 11 | Status: SHIPPED | OUTPATIENT
Start: 2021-04-29 | End: 2021-09-03 | Stop reason: SDUPTHER

## 2021-05-05 ENCOUNTER — OFFICE VISIT (OUTPATIENT)
Dept: NEUROLOGY | Facility: CLINIC | Age: 26
End: 2021-05-05
Payer: COMMERCIAL

## 2021-05-05 VITALS — HEIGHT: 60 IN | BODY MASS INDEX: 20.96 KG/M2

## 2021-05-05 DIAGNOSIS — G40.814 LENNOX-GASTAUT SYNDROME, INTRACTABLE, WITHOUT STATUS EPILEPTICUS: Primary | ICD-10-CM

## 2021-05-05 DIAGNOSIS — R62.50 DEVELOPMENTAL DELAY: ICD-10-CM

## 2021-05-05 DIAGNOSIS — G40.909 SEIZURE DISORDER: ICD-10-CM

## 2021-05-05 DIAGNOSIS — F84.0 AUTISM: ICD-10-CM

## 2021-05-05 PROCEDURE — 99214 OFFICE O/P EST MOD 30 MIN: CPT | Mod: 95,,, | Performed by: PHYSICIAN ASSISTANT

## 2021-05-05 PROCEDURE — 99214 PR OFFICE/OUTPT VISIT, EST, LEVL IV, 30-39 MIN: ICD-10-PCS | Mod: 95,,, | Performed by: PHYSICIAN ASSISTANT

## 2021-05-05 PROCEDURE — 3008F BODY MASS INDEX DOCD: CPT | Mod: CPTII,,, | Performed by: PHYSICIAN ASSISTANT

## 2021-05-05 PROCEDURE — 3008F PR BODY MASS INDEX (BMI) DOCUMENTED: ICD-10-PCS | Mod: CPTII,,, | Performed by: PHYSICIAN ASSISTANT

## 2021-05-05 RX ORDER — CEFUROXIME AXETIL 500 MG/1
500 TABLET ORAL 2 TIMES DAILY
Status: ON HOLD | COMMUNITY
Start: 2021-05-03 | End: 2022-02-18 | Stop reason: CLARIF

## 2021-05-05 RX ORDER — DIAZEPAM 10 MG/1
TABLET ORAL
Qty: 90 TABLET | Refills: 5 | Status: SHIPPED | OUTPATIENT
Start: 2021-05-05 | End: 2021-09-03 | Stop reason: SDUPTHER

## 2021-05-05 RX ORDER — CLONAZEPAM 0.5 MG/1
0.5 TABLET ORAL NIGHTLY
Qty: 30 TABLET | Refills: 5 | Status: SHIPPED | OUTPATIENT
Start: 2021-05-05 | End: 2021-09-03 | Stop reason: SDUPTHER

## 2021-05-13 NOTE — TELEPHONE ENCOUNTER
----- Message from Emilie Greenwood sent at 5/9/2019 11:20 AM CDT -----  Contact: mom  [phone]  Needs Advice    Reason for call:  PA        Communication Preference:  [phone]    Additional Information: needs a PA for  diazePAM (VALIUM) 10 MG Tab    
Telephoned mom she informs me Lamont Valium needs a PA I informed mom I would send it in  Mom voiced understanding   
Price (Do Not Change): 0.00
Detail Level: Generalized
Instructions: This plan will send the code FBSE to the PM system.  DO NOT or CHANGE the price.

## 2021-05-24 ENCOUNTER — SPECIALTY PHARMACY (OUTPATIENT)
Dept: PHARMACY | Facility: CLINIC | Age: 26
End: 2021-05-24

## 2021-05-24 DIAGNOSIS — R62.50 DEVELOPMENTAL DELAY: ICD-10-CM

## 2021-05-24 DIAGNOSIS — F84.0 AUTISM: ICD-10-CM

## 2021-05-24 DIAGNOSIS — G40.814 LENNOX-GASTAUT SYNDROME, INTRACTABLE, WITHOUT STATUS EPILEPTICUS: ICD-10-CM

## 2021-05-24 DIAGNOSIS — G40.909 SEIZURE DISORDER: ICD-10-CM

## 2021-05-25 ENCOUNTER — SPECIALTY PHARMACY (OUTPATIENT)
Dept: PHARMACY | Facility: CLINIC | Age: 26
End: 2021-05-25

## 2021-06-17 ENCOUNTER — SPECIALTY PHARMACY (OUTPATIENT)
Dept: PHARMACY | Facility: CLINIC | Age: 26
End: 2021-06-17

## 2021-07-07 ENCOUNTER — SPECIALTY PHARMACY (OUTPATIENT)
Dept: PHARMACY | Facility: CLINIC | Age: 26
End: 2021-07-07

## 2021-07-07 ENCOUNTER — PATIENT MESSAGE (OUTPATIENT)
Dept: PHARMACY | Facility: CLINIC | Age: 26
End: 2021-07-07

## 2021-07-12 DIAGNOSIS — R62.50 DEVELOPMENTAL DELAY: ICD-10-CM

## 2021-07-12 DIAGNOSIS — G40.814 LENNOX-GASTAUT SYNDROME, INTRACTABLE, WITHOUT STATUS EPILEPTICUS: ICD-10-CM

## 2021-07-12 DIAGNOSIS — G40.909 SEIZURE DISORDER: ICD-10-CM

## 2021-07-12 DIAGNOSIS — F84.0 AUTISM: ICD-10-CM

## 2021-07-27 ENCOUNTER — TELEPHONE (OUTPATIENT)
Dept: NEUROLOGY | Facility: CLINIC | Age: 26
End: 2021-07-27

## 2021-07-27 DIAGNOSIS — G40.909 SEIZURE DISORDER: ICD-10-CM

## 2021-07-28 RX ORDER — PHENOBARBITAL 64.8 MG/1
TABLET ORAL
Qty: 90 TABLET | Refills: 5 | OUTPATIENT
Start: 2021-07-28

## 2021-08-02 ENCOUNTER — SPECIALTY PHARMACY (OUTPATIENT)
Dept: PHARMACY | Facility: CLINIC | Age: 26
End: 2021-08-02

## 2021-08-26 DIAGNOSIS — G40.909 SEIZURE DISORDER: ICD-10-CM

## 2021-08-26 RX ORDER — PHENOBARBITAL 64.8 MG/1
TABLET ORAL
Qty: 90 TABLET | Refills: 0 | Status: SHIPPED | OUTPATIENT
Start: 2021-08-26 | End: 2021-09-20 | Stop reason: SDUPTHER

## 2021-09-03 ENCOUNTER — SPECIALTY PHARMACY (OUTPATIENT)
Dept: PHARMACY | Facility: CLINIC | Age: 26
End: 2021-09-03

## 2021-09-03 DIAGNOSIS — G40.209 PARTIAL SYMPTOMATIC EPILEPSY WITH COMPLEX PARTIAL SEIZURES, NOT INTRACTABLE, WITHOUT STATUS EPILEPTICUS: ICD-10-CM

## 2021-09-03 DIAGNOSIS — G40.201 PARTIAL SYMPTOMATIC EPILEPSY WITH COMPLEX PARTIAL SEIZURES, NOT INTRACTABLE, WITH STATUS EPILEPTICUS: ICD-10-CM

## 2021-09-03 DIAGNOSIS — G40.909 SEIZURE DISORDER: ICD-10-CM

## 2021-09-03 DIAGNOSIS — R56.9 CONVULSIONS, UNSPECIFIED CONVULSION TYPE: ICD-10-CM

## 2021-09-04 RX ORDER — DIAZEPAM 10 MG/1
TABLET ORAL
Qty: 90 TABLET | Refills: 1 | Status: SHIPPED | OUTPATIENT
Start: 2021-09-04 | End: 2021-09-20 | Stop reason: SDUPTHER

## 2021-09-04 RX ORDER — TOPIRAMATE 200 MG/1
200 TABLET ORAL 2 TIMES DAILY
Qty: 60 TABLET | Refills: 1 | Status: SHIPPED | OUTPATIENT
Start: 2021-09-04 | End: 2021-09-20 | Stop reason: SDUPTHER

## 2021-09-04 RX ORDER — LAMOTRIGINE 200 MG/1
200 TABLET ORAL 2 TIMES DAILY
Qty: 60 TABLET | Refills: 1 | Status: SHIPPED | OUTPATIENT
Start: 2021-09-04 | End: 2021-09-20 | Stop reason: SDUPTHER

## 2021-09-04 RX ORDER — CLONAZEPAM 0.5 MG/1
0.5 TABLET ORAL NIGHTLY
Qty: 30 TABLET | Refills: 1 | Status: SHIPPED | OUTPATIENT
Start: 2021-09-04 | End: 2021-09-20 | Stop reason: SDUPTHER

## 2021-09-17 ENCOUNTER — TELEPHONE (OUTPATIENT)
Dept: NEUROLOGY | Facility: CLINIC | Age: 26
End: 2021-09-17

## 2021-09-20 ENCOUNTER — SPECIALTY PHARMACY (OUTPATIENT)
Dept: PHARMACY | Facility: CLINIC | Age: 26
End: 2021-09-20

## 2021-09-20 ENCOUNTER — TELEPHONE (OUTPATIENT)
Dept: NEUROLOGY | Facility: CLINIC | Age: 26
End: 2021-09-20

## 2021-09-20 DIAGNOSIS — G40.814 LENNOX-GASTAUT SYNDROME, INTRACTABLE, WITHOUT STATUS EPILEPTICUS: Primary | ICD-10-CM

## 2021-09-20 DIAGNOSIS — G40.209 PARTIAL SYMPTOMATIC EPILEPSY WITH COMPLEX PARTIAL SEIZURES, NOT INTRACTABLE, WITHOUT STATUS EPILEPTICUS: ICD-10-CM

## 2021-09-20 DIAGNOSIS — G40.909 SEIZURE DISORDER: ICD-10-CM

## 2021-09-20 DIAGNOSIS — R56.9 CONVULSIONS, UNSPECIFIED CONVULSION TYPE: ICD-10-CM

## 2021-09-20 DIAGNOSIS — G40.201 PARTIAL SYMPTOMATIC EPILEPSY WITH COMPLEX PARTIAL SEIZURES, NOT INTRACTABLE, WITH STATUS EPILEPTICUS: ICD-10-CM

## 2021-09-20 RX ORDER — TOPIRAMATE 200 MG/1
200 TABLET ORAL 2 TIMES DAILY
Qty: 60 TABLET | Refills: 5 | Status: SHIPPED | OUTPATIENT
Start: 2021-09-20 | End: 2022-03-15 | Stop reason: SDUPTHER

## 2021-09-20 RX ORDER — CLONAZEPAM 0.5 MG/1
0.5 TABLET ORAL NIGHTLY
Qty: 30 TABLET | Refills: 5 | Status: SHIPPED | OUTPATIENT
Start: 2021-09-20 | End: 2022-03-15 | Stop reason: SDUPTHER

## 2021-09-20 RX ORDER — DIAZEPAM 10 MG/1
TABLET ORAL
Qty: 90 TABLET | Refills: 5 | Status: SHIPPED | OUTPATIENT
Start: 2021-09-20 | End: 2022-03-15 | Stop reason: SDUPTHER

## 2021-09-20 RX ORDER — PHENOBARBITAL 64.8 MG/1
TABLET ORAL
Qty: 90 TABLET | Refills: 5 | Status: SHIPPED | OUTPATIENT
Start: 2021-09-20 | End: 2022-03-15 | Stop reason: SDUPTHER

## 2021-09-20 RX ORDER — LAMOTRIGINE 200 MG/1
200 TABLET ORAL 2 TIMES DAILY
Qty: 60 TABLET | Refills: 5 | Status: SHIPPED | OUTPATIENT
Start: 2021-09-20 | End: 2022-03-15 | Stop reason: SDUPTHER

## 2021-09-28 ENCOUNTER — TELEPHONE (OUTPATIENT)
Dept: NEUROLOGY | Facility: CLINIC | Age: 26
End: 2021-09-28

## 2021-10-20 ENCOUNTER — SPECIALTY PHARMACY (OUTPATIENT)
Dept: PHARMACY | Facility: CLINIC | Age: 26
End: 2021-10-20
Payer: COMMERCIAL

## 2021-11-24 ENCOUNTER — SPECIALTY PHARMACY (OUTPATIENT)
Dept: PHARMACY | Facility: CLINIC | Age: 26
End: 2021-11-24
Payer: COMMERCIAL

## 2021-12-20 ENCOUNTER — SPECIALTY PHARMACY (OUTPATIENT)
Dept: PHARMACY | Facility: CLINIC | Age: 26
End: 2021-12-20
Payer: COMMERCIAL

## 2022-01-14 ENCOUNTER — SPECIALTY PHARMACY (OUTPATIENT)
Dept: PHARMACY | Facility: CLINIC | Age: 27
End: 2022-01-14
Payer: COMMERCIAL

## 2022-01-14 NOTE — TELEPHONE ENCOUNTER
Specialty Pharmacy - Refill Coordination    Specialty Medication Orders Linked to Encounter    Flowsheet Row Most Recent Value   Medication #1 cannabidioL (EPIDIOLEX) 100 mg/mL (Order#[id number], Rx#[id number])          Refill Questions - Documented Responses    Flowsheet Row Most Recent Value   Patient Availability and HIPAA Verification    Does patient want to proceed with activity? Yes   HIPAA/medical authority confirmed? Yes   Relationship to patient of person spoken to? Mother   Refill Screening Questions    Changes to allergies? No   Changes to medications? No   New conditions since last clinic visit? No   Unplanned office visit, urgent care, ED, or hospital admission in the last 4 weeks? No   How does patient/caregiver feel medication is working? Good   Financial problems or insurance changes? No   How many doses of your specialty medications were missed in the last 4 weeks? 0   Would patient like to speak to a pharmacist? No   When does the patient need to receive the medication? 01/19/22   Refill Delivery Questions    How will the patient receive the medication? Mail   When does the patient need to receive the medication? 01/19/22   Shipping Address Prescription   Address in [facility] confirmed and updated if neccessary? Yes   Expected Copay ($) 0   Is the patient able to afford the medication copay? Yes   Payment Method zero copay   Days supply of Refill 25   Supplies needed? No supplies needed   Refill activity completed? Yes   Refill activity plan Refill scheduled   Shipment/Pickup Date: 01/18/22          Current Outpatient Medications   Medication Sig    cannabidioL (EPIDIOLEX) 100 mg/mL Give 2 ml by mouth twice a day for seizures.    cannabidioL (EPIDIOLEX) 100 mg/mL Take 4 ml by mouth twice a day for seizures.    cefUROXime (CEFTIN) 500 MG tablet Take 500 mg by mouth 2 (two) times daily.    clonazePAM (KLONOPIN) 0.5 MG tablet Take 1 tablet (0.5 mg total) by mouth every evening.     diazePAM (VALIUM) 10 MG Tab One three times daily    lamoTRIgine (LAMICTAL) 200 MG tablet Take 1 tablet (200 mg total) by mouth 2 (two) times daily. Take one tablet twice a day.    PHENobarbitaL (LUMINAL) 64.8 MG tablet TAKE 3 TABLETS BY MOUTH ONCE DAILY AT BEDTIME. NEEDS APPOINTMENT    topiramate (TOPAMAX) 200 MG Tab Take 1 tablet (200 mg total) by mouth 2 (two) times daily.   Last reviewed on 5/5/2021  4:06 PM by BRIAN Morrison    Review of patient's allergies indicates:  No Known Allergies Last reviewed on  9/20/2021 3:50 PM by Asha Wang      Tasks added this encounter   2/6/2022 - Refill Call (Auto Added)   Tasks due within next 3 months   No tasks due.     Ary Mandujano, PharmD  [facility]  [address] [place] [id number]  Phone: [phone]  Fax: [phone]

## 2022-01-18 ENCOUNTER — TELEPHONE (OUTPATIENT)
Dept: NEUROLOGY | Facility: CLINIC | Age: 27
End: 2022-01-18
Payer: COMMERCIAL

## 2022-01-18 DIAGNOSIS — G40.909 SEIZURE DISORDER: Primary | ICD-10-CM

## 2022-01-18 NOTE — TELEPHONE ENCOUNTER
I have ordered labs for Lamont. Please fax to [facility] and advise mother to take him in for these. He needs an appointment after labs, in about a week. Thank you. He has not been seen in a very long time.

## 2022-01-29 ENCOUNTER — PATIENT MESSAGE (OUTPATIENT)
Dept: NEUROLOGY | Facility: CLINIC | Age: 27
End: 2022-01-29
Payer: COMMERCIAL

## 2022-01-31 ENCOUNTER — TELEPHONE (OUTPATIENT)
Dept: NEUROLOGY | Facility: CLINIC | Age: 27
End: 2022-01-31
Payer: COMMERCIAL

## 2022-01-31 NOTE — TELEPHONE ENCOUNTER
Key: [id number] - PA Case ID: [id number]  Status: Sent to Plan today 1/31/2022  Drug: DiazePAM 10MG tablets

## 2022-02-07 ENCOUNTER — SPECIALTY PHARMACY (OUTPATIENT)
Dept: PHARMACY | Facility: CLINIC | Age: 27
End: 2022-02-07
Payer: COMMERCIAL

## 2022-02-07 NOTE — TELEPHONE ENCOUNTER
Specialty Pharmacy - Refill Coordination    Specialty Medication Orders Linked to Encounter    Flowsheet Row Most Recent Value   Medication #1 cannabidioL (EPIDIOLEX) 100 mg/mL (Order#[id number], Rx#[id number])          Refill Questions - Documented Responses    Flowsheet Row Most Recent Value   Patient Availability and HIPAA Verification    Does patient want to proceed with activity? Yes   HIPAA/medical authority confirmed? Yes   Relationship to patient of person spoken to? Mother   Refill Screening Questions    Changes to allergies? No   Changes to medications? No   New conditions since last clinic visit? No   Unplanned office visit, urgent care, ED, or hospital admission in the last 4 weeks? No   How does patient/caregiver feel medication is working? Good   Financial problems or insurance changes? No   How many doses of your specialty medications were missed in the last 4 weeks? 0   Would patient like to speak to a pharmacist? No   When does the patient need to receive the medication? 02/10/22   Refill Delivery Questions    How will the patient receive the medication? Mail   When does the patient need to receive the medication? 02/10/22   Shipping Address Prescription   Address in [facility] confirmed and updated if neccessary? Yes   Expected Copay ($) 0   Is the patient able to afford the medication copay? Yes   Payment Method zero copay   Days supply of Refill 25   Supplies needed? No supplies needed   Refill activity completed? Yes   Refill activity plan Refill scheduled   Shipment/Pickup Date: 02/09/22          Current Outpatient Medications   Medication Sig    cannabidioL (EPIDIOLEX) 100 mg/mL Give 2 ml by mouth twice a day for seizures.    cannabidioL (EPIDIOLEX) 100 mg/mL Take 4 ml by mouth twice a day for seizures.    cefUROXime (CEFTIN) 500 MG tablet Take 500 mg by mouth 2 (two) times daily.    clonazePAM (KLONOPIN) 0.5 MG tablet Take 1 tablet (0.5 mg total) by mouth every evening.     diazePAM (VALIUM) 10 MG Tab One three times daily    lamoTRIgine (LAMICTAL) 200 MG tablet Take 1 tablet (200 mg total) by mouth 2 (two) times daily. Take one tablet twice a day.    PHENobarbitaL (LUMINAL) 64.8 MG tablet TAKE 3 TABLETS BY MOUTH ONCE DAILY AT BEDTIME. NEEDS APPOINTMENT    topiramate (TOPAMAX) 200 MG Tab Take 1 tablet (200 mg total) by mouth 2 (two) times daily.   Last reviewed on 5/5/2021  4:06 PM by BRIAN Morrison    Review of patient's allergies indicates:  No Known Allergies Last reviewed on  9/20/2021 3:50 PM by Asha Wang      Tasks added this encounter   2/28/2022 - Refill Call (Auto Added)   Tasks due within next 3 months   No tasks due.     Ary Mandujano, PharmD  [facility] - Specialty Pharm[facility]  [address] [place] [id number]  Phone: [phone]  Fax: [phone]

## 2022-02-18 ENCOUNTER — HOSPITAL ENCOUNTER (INPATIENT)
Facility: OTHER | Age: 27
LOS: 3 days | Discharge: HOME | DRG: 871 | End: 2022-02-21
Attending: INTERNAL MEDICINE | Admitting: INTERNAL MEDICINE
Payer: COMMERCIAL

## 2022-02-18 ENCOUNTER — HOSPITAL ENCOUNTER (EMERGENCY)
Facility: HOSPITAL | Age: 27
Discharge: ANOTHER HOSPITAL | End: 2022-02-18
Attending: SURGERY
Payer: COMMERCIAL

## 2022-02-18 VITALS
TEMPERATURE: 99 F | HEART RATE: 65 BPM | SYSTOLIC BLOOD PRESSURE: 115 MMHG | OXYGEN SATURATION: 96 % | DIASTOLIC BLOOD PRESSURE: 81 MMHG | RESPIRATION RATE: 16 BRPM | BODY MASS INDEX: 21.68 KG/M2 | WEIGHT: 96.69 LBS

## 2022-02-18 DIAGNOSIS — E86.0 DEHYDRATION: ICD-10-CM

## 2022-02-18 DIAGNOSIS — A41.9 SEPTIC SHOCK: Primary | ICD-10-CM

## 2022-02-18 DIAGNOSIS — J69.0 ASPIRATION PNEUMONIA, UNSPECIFIED ASPIRATION PNEUMONIA TYPE, UNSPECIFIED LATERALITY, UNSPECIFIED PART OF LUNG: ICD-10-CM

## 2022-02-18 DIAGNOSIS — I95.9 HYPOTENSION, UNSPECIFIED HYPOTENSION TYPE: ICD-10-CM

## 2022-02-18 DIAGNOSIS — R05.9 COUGH: Primary | ICD-10-CM

## 2022-02-18 DIAGNOSIS — I95.9 HYPOTENSION: ICD-10-CM

## 2022-02-18 DIAGNOSIS — Z45.2 ENCOUNTER FOR CENTRAL LINE PLACEMENT: ICD-10-CM

## 2022-02-18 DIAGNOSIS — R65.21 SEPTIC SHOCK: Primary | ICD-10-CM

## 2022-02-18 LAB
ALBUMIN SERPL BCP-MCNC: 3.9 G/DL (ref 3.5–5.2)
ALP SERPL-CCNC: 83 U/L (ref 55–135)
ALT SERPL W/O P-5'-P-CCNC: 29 U/L (ref 10–44)
ANION GAP SERPL CALC-SCNC: 8 MMOL/L (ref 8–16)
AST SERPL-CCNC: 33 U/L (ref 10–40)
BACTERIA #/AREA URNS HPF: ABNORMAL /HPF
BASOPHILS # BLD AUTO: 0.03 K/UL (ref 0–0.2)
BASOPHILS NFR BLD: 0.3 % (ref 0–1.9)
BILIRUB SERPL-MCNC: 0.5 MG/DL (ref 0.1–1)
BILIRUB UR QL STRIP: NEGATIVE
BNP SERPL-MCNC: <10 PG/ML (ref 0–99)
BUN SERPL-MCNC: 9 MG/DL (ref 6–20)
CALCIUM SERPL-MCNC: 7.8 MG/DL (ref 8.7–10.5)
CHLORIDE SERPL-SCNC: 112 MMOL/L (ref 95–110)
CK MB SERPL-MCNC: 0.2 NG/ML (ref 0.1–6.5)
CK MB SERPL-RTO: 0.4 % (ref 0–5)
CK SERPL-CCNC: 53 U/L (ref 20–200)
CK SERPL-CCNC: 53 U/L (ref 20–200)
CLARITY UR: CLEAR
CO2 SERPL-SCNC: 20 MMOL/L (ref 23–29)
COLOR UR: YELLOW
CREAT SERPL-MCNC: 0.9 MG/DL (ref 0.5–1.4)
DIFFERENTIAL METHOD: ABNORMAL
EOSINOPHIL # BLD AUTO: 0.1 K/UL (ref 0–0.5)
EOSINOPHIL NFR BLD: 1.5 % (ref 0–8)
ERYTHROCYTE [DISTWIDTH] IN BLOOD BY AUTOMATED COUNT: 13.5 % (ref 11.5–14.5)
EST. GFR  (AFRICAN AMERICAN): >60 ML/MIN/1.73 M^2
EST. GFR  (NON AFRICAN AMERICAN): >60 ML/MIN/1.73 M^2
GLUCOSE SERPL-MCNC: 99 MG/DL (ref 70–110)
GLUCOSE UR QL STRIP: NEGATIVE
GROUP A STREP, MOLECULAR: NEGATIVE
HCT VFR BLD AUTO: 39.3 % (ref 40–54)
HGB BLD-MCNC: 12.9 G/DL (ref 14–18)
HGB UR QL STRIP: ABNORMAL
IMM GRANULOCYTES # BLD AUTO: 0.03 K/UL (ref 0–0.04)
IMM GRANULOCYTES NFR BLD AUTO: 0.3 % (ref 0–0.5)
INFLUENZA A, MOLECULAR: NEGATIVE
INFLUENZA B, MOLECULAR: NEGATIVE
KETONES UR QL STRIP: NEGATIVE
LACTATE SERPL-SCNC: 0.9 MMOL/L (ref 0.5–2.2)
LEUKOCYTE ESTERASE UR QL STRIP: NEGATIVE
LYMPHOCYTES # BLD AUTO: 0.6 K/UL (ref 1–4.8)
LYMPHOCYTES NFR BLD: 6.8 % (ref 18–48)
MCH RBC QN AUTO: 31.5 PG (ref 27–31)
MCHC RBC AUTO-ENTMCNC: 32.8 G/DL (ref 32–36)
MCV RBC AUTO: 96 FL (ref 82–98)
MICROSCOPIC COMMENT: ABNORMAL
MONOCYTES # BLD AUTO: 0.7 K/UL (ref 0.3–1)
MONOCYTES NFR BLD: 7.5 % (ref 4–15)
NEUTROPHILS # BLD AUTO: 7.3 K/UL (ref 1.8–7.7)
NEUTROPHILS NFR BLD: 83.6 % (ref 38–73)
NITRITE UR QL STRIP: NEGATIVE
NRBC BLD-RTO: 0 /100 WBC
PH UR STRIP: 7 [PH] (ref 5–8)
PLATELET # BLD AUTO: 118 K/UL (ref 150–450)
PMV BLD AUTO: 10.6 FL (ref 9.2–12.9)
POTASSIUM SERPL-SCNC: 3.6 MMOL/L (ref 3.5–5.1)
PROCALCITONIN SERPL IA-MCNC: 0.48 NG/ML
PROT SERPL-MCNC: 6.3 G/DL (ref 6–8.4)
PROT UR QL STRIP: NEGATIVE
RBC # BLD AUTO: 4.09 M/UL (ref 4.6–6.2)
RBC #/AREA URNS HPF: 10 /HPF (ref 0–4)
SARS-COV-2 RDRP RESP QL NAA+PROBE: NEGATIVE
SODIUM SERPL-SCNC: 140 MMOL/L (ref 136–145)
SP GR UR STRIP: 1.01 (ref 1–1.03)
SPECIMEN SOURCE: NORMAL
TROPONIN I SERPL DL<=0.01 NG/ML-MCNC: <0.006 NG/ML (ref 0–0.03)
URN SPEC COLLECT METH UR: ABNORMAL
UROBILINOGEN UR STRIP-ACNC: NEGATIVE EU/DL
WBC # BLD AUTO: 8.78 K/UL (ref 3.9–12.7)
WBC #/AREA URNS HPF: 2 /HPF (ref 0–5)

## 2022-02-18 PROCEDURE — 99223 1ST HOSP IP/OBS HIGH 75: CPT | Mod: ,,, | Performed by: PHYSICIAN ASSISTANT

## 2022-02-18 PROCEDURE — 96367 TX/PROPH/DG ADDL SEQ IV INF: CPT

## 2022-02-18 PROCEDURE — 93005 ELECTROCARDIOGRAM TRACING: CPT | Mod: 59

## 2022-02-18 PROCEDURE — 83605 ASSAY OF LACTIC ACID: CPT | Performed by: SURGERY

## 2022-02-18 PROCEDURE — 87651 STREP A DNA AMP PROBE: CPT | Performed by: SURGERY

## 2022-02-18 PROCEDURE — 36415 COLL VENOUS BLD VENIPUNCTURE: CPT | Performed by: PHYSICIAN ASSISTANT

## 2022-02-18 PROCEDURE — 96360 HYDRATION IV INFUSION INIT: CPT | Mod: 59

## 2022-02-18 PROCEDURE — 99291 CRITICAL CARE FIRST HOUR: CPT | Mod: 25

## 2022-02-18 PROCEDURE — 84484 ASSAY OF TROPONIN QUANT: CPT | Performed by: SURGERY

## 2022-02-18 PROCEDURE — U0002 COVID-19 LAB TEST NON-CDC: HCPCS | Performed by: SURGERY

## 2022-02-18 PROCEDURE — 80053 COMPREHEN METABOLIC PANEL: CPT | Performed by: SURGERY

## 2022-02-18 PROCEDURE — 80048 BASIC METABOLIC PNL TOTAL CA: CPT | Performed by: PHYSICIAN ASSISTANT

## 2022-02-18 PROCEDURE — 85025 COMPLETE CBC W/AUTO DIFF WBC: CPT | Mod: 91 | Performed by: PHYSICIAN ASSISTANT

## 2022-02-18 PROCEDURE — 36415 COLL VENOUS BLD VENIPUNCTURE: CPT | Performed by: SURGERY

## 2022-02-18 PROCEDURE — 63600175 PHARM REV CODE 636 W HCPCS: Performed by: SURGERY

## 2022-02-18 PROCEDURE — 83605 ASSAY OF LACTIC ACID: CPT | Mod: 91 | Performed by: PHYSICIAN ASSISTANT

## 2022-02-18 PROCEDURE — 96361 HYDRATE IV INFUSION ADD-ON: CPT | Mod: 59

## 2022-02-18 PROCEDURE — 82553 CREATINE MB FRACTION: CPT | Performed by: SURGERY

## 2022-02-18 PROCEDURE — 83880 ASSAY OF NATRIURETIC PEPTIDE: CPT | Performed by: SURGERY

## 2022-02-18 PROCEDURE — 96375 TX/PRO/DX INJ NEW DRUG ADDON: CPT

## 2022-02-18 PROCEDURE — 81000 URINALYSIS NONAUTO W/SCOPE: CPT | Performed by: SURGERY

## 2022-02-18 PROCEDURE — 36556 INSERT NON-TUNNEL CV CATH: CPT

## 2022-02-18 PROCEDURE — 87040 BLOOD CULTURE FOR BACTERIA: CPT | Mod: 59 | Performed by: SURGERY

## 2022-02-18 PROCEDURE — 87502 INFLUENZA DNA AMP PROBE: CPT | Performed by: SURGERY

## 2022-02-18 PROCEDURE — 93010 ELECTROCARDIOGRAM REPORT: CPT | Mod: ,,, | Performed by: INTERNAL MEDICINE

## 2022-02-18 PROCEDURE — 84145 PROCALCITONIN (PCT): CPT | Performed by: SURGERY

## 2022-02-18 PROCEDURE — 25000003 PHARM REV CODE 250: Performed by: SURGERY

## 2022-02-18 PROCEDURE — 93010 EKG 12-LEAD: ICD-10-PCS | Mod: ,,, | Performed by: INTERNAL MEDICINE

## 2022-02-18 PROCEDURE — 85025 COMPLETE CBC W/AUTO DIFF WBC: CPT | Performed by: SURGERY

## 2022-02-18 PROCEDURE — 20000000 HC ICU ROOM

## 2022-02-18 PROCEDURE — 80184 ASSAY OF PHENOBARBITAL: CPT | Performed by: SURGERY

## 2022-02-18 PROCEDURE — 96365 THER/PROPH/DIAG IV INF INIT: CPT

## 2022-02-18 PROCEDURE — 80175 DRUG SCREEN QUAN LAMOTRIGINE: CPT | Performed by: SURGERY

## 2022-02-18 PROCEDURE — 99223 PR INITIAL HOSPITAL CARE,LEVL III: ICD-10-PCS | Mod: ,,, | Performed by: PHYSICIAN ASSISTANT

## 2022-02-18 RX ORDER — CLINDAMYCIN PHOSPHATE 600 MG/50ML
600 INJECTION, SOLUTION INTRAVENOUS
Status: DISCONTINUED | OUTPATIENT
Start: 2022-02-18 | End: 2022-02-18 | Stop reason: HOSPADM

## 2022-02-18 RX ORDER — SODIUM CHLORIDE 9 MG/ML
INJECTION, SOLUTION INTRAVENOUS
Status: COMPLETED | OUTPATIENT
Start: 2022-02-18 | End: 2022-02-18

## 2022-02-18 RX ORDER — VANCOMYCIN HCL IN 5 % DEXTROSE 1G/250ML
1000 PLASTIC BAG, INJECTION (ML) INTRAVENOUS ONCE
Status: COMPLETED | OUTPATIENT
Start: 2022-02-18 | End: 2022-02-18

## 2022-02-18 RX ORDER — TALC
6 POWDER (GRAM) TOPICAL NIGHTLY PRN
Status: DISCONTINUED | OUTPATIENT
Start: 2022-02-19 | End: 2022-02-21 | Stop reason: HOSPADM

## 2022-02-18 RX ORDER — SODIUM CHLORIDE 9 MG/ML
1000 INJECTION, SOLUTION INTRAVENOUS CONTINUOUS
Status: DISCONTINUED | OUTPATIENT
Start: 2022-02-18 | End: 2022-02-18 | Stop reason: HOSPADM

## 2022-02-18 RX ORDER — ACETAMINOPHEN 500 MG
1000 TABLET ORAL
Status: COMPLETED | OUTPATIENT
Start: 2022-02-18 | End: 2022-02-18

## 2022-02-18 RX ORDER — NOREPINEPHRINE BITARTRATE/D5W 4MG/250ML
0-3 PLASTIC BAG, INJECTION (ML) INTRAVENOUS CONTINUOUS
Status: DISCONTINUED | OUTPATIENT
Start: 2022-02-18 | End: 2022-02-18 | Stop reason: HOSPADM

## 2022-02-18 RX ORDER — GLUCAGON 1 MG
1 KIT INJECTION
Status: DISCONTINUED | OUTPATIENT
Start: 2022-02-19 | End: 2022-02-21 | Stop reason: HOSPADM

## 2022-02-18 RX ORDER — SODIUM CHLORIDE 9 MG/ML
INJECTION, SOLUTION INTRAVENOUS CONTINUOUS
Status: DISCONTINUED | OUTPATIENT
Start: 2022-02-19 | End: 2022-02-19

## 2022-02-18 RX ORDER — SODIUM CHLORIDE 0.9 % (FLUSH) 0.9 %
10 SYRINGE (ML) INJECTION EVERY 8 HOURS
Status: DISCONTINUED | OUTPATIENT
Start: 2022-02-19 | End: 2022-02-21 | Stop reason: HOSPADM

## 2022-02-18 RX ORDER — ONDANSETRON 2 MG/ML
4 INJECTION INTRAMUSCULAR; INTRAVENOUS
Status: DISCONTINUED | OUTPATIENT
Start: 2022-02-18 | End: 2022-02-18 | Stop reason: HOSPADM

## 2022-02-18 RX ORDER — ACETAMINOPHEN 325 MG/1
650 TABLET ORAL EVERY 4 HOURS PRN
Status: DISCONTINUED | OUTPATIENT
Start: 2022-02-19 | End: 2022-02-21 | Stop reason: HOSPADM

## 2022-02-18 RX ORDER — IBUPROFEN 800 MG/1
800 TABLET ORAL
Status: COMPLETED | OUTPATIENT
Start: 2022-02-18 | End: 2022-02-18

## 2022-02-18 RX ORDER — NOREPINEPHRINE BITARTRATE/D5W 4MG/250ML
0-3 PLASTIC BAG, INJECTION (ML) INTRAVENOUS CONTINUOUS
Status: DISCONTINUED | OUTPATIENT
Start: 2022-02-19 | End: 2022-02-20

## 2022-02-18 RX ORDER — IBUPROFEN 200 MG
16 TABLET ORAL
Status: DISCONTINUED | OUTPATIENT
Start: 2022-02-19 | End: 2022-02-21 | Stop reason: HOSPADM

## 2022-02-18 RX ORDER — AMOXICILLIN 250 MG
1 CAPSULE ORAL 2 TIMES DAILY PRN
Status: DISCONTINUED | OUTPATIENT
Start: 2022-02-19 | End: 2022-02-21 | Stop reason: HOSPADM

## 2022-02-18 RX ORDER — IBUPROFEN 200 MG
24 TABLET ORAL
Status: DISCONTINUED | OUTPATIENT
Start: 2022-02-19 | End: 2022-02-21 | Stop reason: HOSPADM

## 2022-02-18 RX ADMIN — VANCOMYCIN HYDROCHLORIDE 1000 MG: 1 INJECTION, POWDER, LYOPHILIZED, FOR SOLUTION INTRAVENOUS at 07:02

## 2022-02-18 RX ADMIN — IBUPROFEN 800 MG: 800 TABLET, FILM COATED ORAL at 11:02

## 2022-02-18 RX ADMIN — SODIUM CHLORIDE: 0.9 INJECTION, SOLUTION INTRAVENOUS at 12:02

## 2022-02-18 RX ADMIN — SODIUM CHLORIDE 1000 ML: 0.9 INJECTION, SOLUTION INTRAVENOUS at 05:02

## 2022-02-18 RX ADMIN — Medication 0.02 MCG/KG/MIN: at 05:02

## 2022-02-18 RX ADMIN — PIPERACILLIN AND TAZOBACTAM 4.5 G: 4; .5 INJECTION, POWDER, LYOPHILIZED, FOR SOLUTION INTRAVENOUS; PARENTERAL at 05:02

## 2022-02-18 RX ADMIN — ACETAMINOPHEN 1000 MG: 500 TABLET ORAL at 11:02

## 2022-02-18 RX ADMIN — CLINDAMYCIN PHOSPHATE 600 MG: 600 INJECTION, SOLUTION INTRAVENOUS at 06:02

## 2022-02-18 RX ADMIN — SODIUM CHLORIDE 1000 ML: 0.9 INJECTION, SOLUTION INTRAVENOUS at 03:02

## 2022-02-19 PROBLEM — R65.21 SEPTIC SHOCK: Status: ACTIVE | Noted: 2022-02-19

## 2022-02-19 PROBLEM — R53.81 DEBILITY: Status: ACTIVE | Noted: 2022-02-19

## 2022-02-19 PROBLEM — R19.7 DIARRHEA: Status: ACTIVE | Noted: 2022-02-19

## 2022-02-19 PROBLEM — A41.9 SEPTIC SHOCK: Status: ACTIVE | Noted: 2022-02-19

## 2022-02-19 LAB
ANION GAP SERPL CALC-SCNC: 6 MMOL/L (ref 8–16)
ANION GAP SERPL CALC-SCNC: 8 MMOL/L (ref 8–16)
BASOPHILS # BLD AUTO: 0.03 K/UL (ref 0–0.2)
BASOPHILS # BLD AUTO: 0.04 K/UL (ref 0–0.2)
BASOPHILS NFR BLD: 0.3 % (ref 0–1.9)
BASOPHILS NFR BLD: 0.3 % (ref 0–1.9)
BUN SERPL-MCNC: 6 MG/DL (ref 6–20)
BUN SERPL-MCNC: 6 MG/DL (ref 6–20)
CALCIUM SERPL-MCNC: 7.9 MG/DL (ref 8.7–10.5)
CALCIUM SERPL-MCNC: 7.9 MG/DL (ref 8.7–10.5)
CHLORIDE SERPL-SCNC: 112 MMOL/L (ref 95–110)
CHLORIDE SERPL-SCNC: 116 MMOL/L (ref 95–110)
CO2 SERPL-SCNC: 18 MMOL/L (ref 23–29)
CO2 SERPL-SCNC: 19 MMOL/L (ref 23–29)
CREAT SERPL-MCNC: 0.7 MG/DL (ref 0.5–1.4)
CREAT SERPL-MCNC: 0.8 MG/DL (ref 0.5–1.4)
DIFFERENTIAL METHOD: ABNORMAL
DIFFERENTIAL METHOD: ABNORMAL
EOSINOPHIL # BLD AUTO: 0.5 K/UL (ref 0–0.5)
EOSINOPHIL # BLD AUTO: 0.6 K/UL (ref 0–0.5)
EOSINOPHIL NFR BLD: 3.7 % (ref 0–8)
EOSINOPHIL NFR BLD: 6 % (ref 0–8)
ERYTHROCYTE [DISTWIDTH] IN BLOOD BY AUTOMATED COUNT: 13.5 % (ref 11.5–14.5)
ERYTHROCYTE [DISTWIDTH] IN BLOOD BY AUTOMATED COUNT: 13.6 % (ref 11.5–14.5)
EST. GFR  (AFRICAN AMERICAN): >60 ML/MIN/1.73 M^2
EST. GFR  (AFRICAN AMERICAN): >60 ML/MIN/1.73 M^2
EST. GFR  (NON AFRICAN AMERICAN): >60 ML/MIN/1.73 M^2
EST. GFR  (NON AFRICAN AMERICAN): >60 ML/MIN/1.73 M^2
GLUCOSE SERPL-MCNC: 87 MG/DL (ref 70–110)
GLUCOSE SERPL-MCNC: 98 MG/DL (ref 70–110)
HCT VFR BLD AUTO: 36.6 % (ref 40–54)
HCT VFR BLD AUTO: 36.7 % (ref 40–54)
HGB BLD-MCNC: 12 G/DL (ref 14–18)
HGB BLD-MCNC: 12.1 G/DL (ref 14–18)
IMM GRANULOCYTES # BLD AUTO: 0.03 K/UL (ref 0–0.04)
IMM GRANULOCYTES # BLD AUTO: 0.04 K/UL (ref 0–0.04)
IMM GRANULOCYTES NFR BLD AUTO: 0.3 % (ref 0–0.5)
IMM GRANULOCYTES NFR BLD AUTO: 0.3 % (ref 0–0.5)
LACTATE SERPL-SCNC: 1 MMOL/L (ref 0.5–2.2)
LYMPHOCYTES # BLD AUTO: 1.6 K/UL (ref 1–4.8)
LYMPHOCYTES # BLD AUTO: 2.6 K/UL (ref 1–4.8)
LYMPHOCYTES NFR BLD: 15.7 % (ref 18–48)
LYMPHOCYTES NFR BLD: 19.8 % (ref 18–48)
MCH RBC QN AUTO: 31.3 PG (ref 27–31)
MCH RBC QN AUTO: 31.6 PG (ref 27–31)
MCHC RBC AUTO-ENTMCNC: 32.8 G/DL (ref 32–36)
MCHC RBC AUTO-ENTMCNC: 33 G/DL (ref 32–36)
MCV RBC AUTO: 96 FL (ref 82–98)
MCV RBC AUTO: 96 FL (ref 82–98)
MONOCYTES # BLD AUTO: 0.6 K/UL (ref 0.3–1)
MONOCYTES # BLD AUTO: 0.8 K/UL (ref 0.3–1)
MONOCYTES NFR BLD: 6 % (ref 4–15)
MONOCYTES NFR BLD: 6.2 % (ref 4–15)
NEUTROPHILS # BLD AUTO: 7.5 K/UL (ref 1.8–7.7)
NEUTROPHILS # BLD AUTO: 9.3 K/UL (ref 1.8–7.7)
NEUTROPHILS NFR BLD: 69.7 % (ref 38–73)
NEUTROPHILS NFR BLD: 71.7 % (ref 38–73)
NRBC BLD-RTO: 0 /100 WBC
NRBC BLD-RTO: 0 /100 WBC
PHENOBARB SERPL-MCNC: 58.9 UG/ML (ref 15–40)
PLATELET # BLD AUTO: 121 K/UL (ref 150–450)
PLATELET # BLD AUTO: 126 K/UL (ref 150–450)
PLATELET BLD QL SMEAR: ABNORMAL
PLATELET BLD QL SMEAR: ABNORMAL
PMV BLD AUTO: 10 FL (ref 9.2–12.9)
PMV BLD AUTO: 10.1 FL (ref 9.2–12.9)
POCT GLUCOSE: 84 MG/DL (ref 70–110)
POCT GLUCOSE: 96 MG/DL (ref 70–110)
POTASSIUM SERPL-SCNC: 3.4 MMOL/L (ref 3.5–5.1)
POTASSIUM SERPL-SCNC: 3.5 MMOL/L (ref 3.5–5.1)
RBC # BLD AUTO: 3.83 M/UL (ref 4.6–6.2)
RBC # BLD AUTO: 3.83 M/UL (ref 4.6–6.2)
SODIUM SERPL-SCNC: 138 MMOL/L (ref 136–145)
SODIUM SERPL-SCNC: 141 MMOL/L (ref 136–145)
WBC # BLD AUTO: 10.4 K/UL (ref 3.9–12.7)
WBC # BLD AUTO: 13.3 K/UL (ref 3.9–12.7)

## 2022-02-19 PROCEDURE — 87338 HPYLORI STOOL AG IA: CPT | Performed by: PHYSICIAN ASSISTANT

## 2022-02-19 PROCEDURE — 27000207 HC ISOLATION

## 2022-02-19 PROCEDURE — 20000000 HC ICU ROOM

## 2022-02-19 PROCEDURE — 97166 OT EVAL MOD COMPLEX 45 MIN: CPT

## 2022-02-19 PROCEDURE — 92610 EVALUATE SWALLOWING FUNCTION: CPT

## 2022-02-19 PROCEDURE — 25000003 PHARM REV CODE 250: Performed by: PHYSICIAN ASSISTANT

## 2022-02-19 PROCEDURE — 82272 OCCULT BLD FECES 1-3 TESTS: CPT | Performed by: PHYSICIAN ASSISTANT

## 2022-02-19 PROCEDURE — 87209 SMEAR COMPLEX STAIN: CPT | Performed by: PHYSICIAN ASSISTANT

## 2022-02-19 PROCEDURE — 97530 THERAPEUTIC ACTIVITIES: CPT

## 2022-02-19 PROCEDURE — A4216 STERILE WATER/SALINE, 10 ML: HCPCS | Performed by: PHYSICIAN ASSISTANT

## 2022-02-19 PROCEDURE — 80048 BASIC METABOLIC PNL TOTAL CA: CPT | Performed by: PHYSICIAN ASSISTANT

## 2022-02-19 PROCEDURE — 85025 COMPLETE CBC W/AUTO DIFF WBC: CPT | Performed by: PHYSICIAN ASSISTANT

## 2022-02-19 PROCEDURE — 83993 ASSAY FOR CALPROTECTIN FECAL: CPT | Performed by: PHYSICIAN ASSISTANT

## 2022-02-19 PROCEDURE — 99291 PR CRITICAL CARE, E/M 30-74 MINUTES: ICD-10-PCS | Mod: ,,, | Performed by: INTERNAL MEDICINE

## 2022-02-19 PROCEDURE — 94761 N-INVAS EAR/PLS OXIMETRY MLT: CPT

## 2022-02-19 PROCEDURE — 97162 PT EVAL MOD COMPLEX 30 MIN: CPT

## 2022-02-19 PROCEDURE — 36415 COLL VENOUS BLD VENIPUNCTURE: CPT | Performed by: PHYSICIAN ASSISTANT

## 2022-02-19 PROCEDURE — 89055 LEUKOCYTE ASSESSMENT FECAL: CPT | Performed by: PHYSICIAN ASSISTANT

## 2022-02-19 PROCEDURE — 87177 OVA AND PARASITES SMEARS: CPT | Performed by: PHYSICIAN ASSISTANT

## 2022-02-19 PROCEDURE — 87329 GIARDIA AG IA: CPT | Performed by: PHYSICIAN ASSISTANT

## 2022-02-19 PROCEDURE — 63600175 PHARM REV CODE 636 W HCPCS: Performed by: PHYSICIAN ASSISTANT

## 2022-02-19 PROCEDURE — 25000003 PHARM REV CODE 250: Performed by: INTERNAL MEDICINE

## 2022-02-19 PROCEDURE — 82705 FATS/LIPIDS FECES QUAL: CPT | Performed by: PHYSICIAN ASSISTANT

## 2022-02-19 PROCEDURE — 99291 CRITICAL CARE FIRST HOUR: CPT | Mod: ,,, | Performed by: INTERNAL MEDICINE

## 2022-02-19 PROCEDURE — 87425 ROTAVIRUS AG IA: CPT | Performed by: PHYSICIAN ASSISTANT

## 2022-02-19 RX ORDER — CLONAZEPAM 0.5 MG/1
0.5 TABLET ORAL NIGHTLY
Status: DISCONTINUED | OUTPATIENT
Start: 2022-02-19 | End: 2022-02-21 | Stop reason: HOSPADM

## 2022-02-19 RX ORDER — DIAZEPAM 5 MG/1
10 TABLET ORAL 3 TIMES DAILY
Status: DISCONTINUED | OUTPATIENT
Start: 2022-02-19 | End: 2022-02-21 | Stop reason: HOSPADM

## 2022-02-19 RX ORDER — TOPIRAMATE 100 MG/1
200 TABLET, FILM COATED ORAL 2 TIMES DAILY
Status: DISCONTINUED | OUTPATIENT
Start: 2022-02-19 | End: 2022-02-21 | Stop reason: HOSPADM

## 2022-02-19 RX ORDER — ENOXAPARIN SODIUM 100 MG/ML
40 INJECTION SUBCUTANEOUS EVERY 24 HOURS
Status: DISCONTINUED | OUTPATIENT
Start: 2022-02-19 | End: 2022-02-21 | Stop reason: HOSPADM

## 2022-02-19 RX ORDER — PHENOBARBITAL 32.4 MG/1
194.4 TABLET ORAL NIGHTLY
Status: DISCONTINUED | OUTPATIENT
Start: 2022-02-19 | End: 2022-02-21 | Stop reason: HOSPADM

## 2022-02-19 RX ORDER — SODIUM CHLORIDE 450 MG/100ML
INJECTION, SOLUTION INTRAVENOUS CONTINUOUS
Status: DISCONTINUED | OUTPATIENT
Start: 2022-02-19 | End: 2022-02-21

## 2022-02-19 RX ORDER — LAMOTRIGINE 100 MG/1
200 TABLET ORAL 2 TIMES DAILY
Status: DISCONTINUED | OUTPATIENT
Start: 2022-02-19 | End: 2022-02-21 | Stop reason: HOSPADM

## 2022-02-19 RX ADMIN — AMPICILLIN SODIUM AND SULBACTAM SODIUM 3 G: 2; 1 INJECTION, POWDER, FOR SOLUTION INTRAMUSCULAR; INTRAVENOUS at 12:02

## 2022-02-19 RX ADMIN — Medication 10 ML: at 01:02

## 2022-02-19 RX ADMIN — LAMOTRIGINE 200 MG: 100 TABLET ORAL at 09:02

## 2022-02-19 RX ADMIN — LAMOTRIGINE 200 MG: 100 TABLET ORAL at 08:02

## 2022-02-19 RX ADMIN — TOPIRAMATE 200 MG: 100 TABLET, FILM COATED ORAL at 03:02

## 2022-02-19 RX ADMIN — AMPICILLIN SODIUM AND SULBACTAM SODIUM 3 G: 2; 1 INJECTION, POWDER, FOR SOLUTION INTRAMUSCULAR; INTRAVENOUS at 05:02

## 2022-02-19 RX ADMIN — ENOXAPARIN SODIUM 40 MG: 100 INJECTION SUBCUTANEOUS at 05:02

## 2022-02-19 RX ADMIN — TOPIRAMATE 200 MG: 100 TABLET, FILM COATED ORAL at 08:02

## 2022-02-19 RX ADMIN — PHENOBARBITAL 194.4 MG: 32.4 TABLET ORAL at 08:02

## 2022-02-19 RX ADMIN — TOPIRAMATE 200 MG: 100 TABLET, FILM COATED ORAL at 09:02

## 2022-02-19 RX ADMIN — CLONAZEPAM 0.5 MG: 0.5 TABLET ORAL at 03:02

## 2022-02-19 RX ADMIN — AMPICILLIN SODIUM AND SULBACTAM SODIUM 3 G: 2; 1 INJECTION, POWDER, FOR SOLUTION INTRAMUSCULAR; INTRAVENOUS at 06:02

## 2022-02-19 RX ADMIN — SODIUM CHLORIDE: 0.9 INJECTION, SOLUTION INTRAVENOUS at 02:02

## 2022-02-19 RX ADMIN — DIAZEPAM 10 MG: 5 TABLET ORAL at 12:02

## 2022-02-19 RX ADMIN — Medication 10 ML: at 09:02

## 2022-02-19 RX ADMIN — CLONAZEPAM 0.5 MG: 0.5 TABLET ORAL at 08:02

## 2022-02-19 RX ADMIN — SODIUM CHLORIDE: 0.45 INJECTION, SOLUTION INTRAVENOUS at 02:02

## 2022-02-19 RX ADMIN — DIAZEPAM 10 MG: 5 TABLET ORAL at 05:02

## 2022-02-19 RX ADMIN — LAMOTRIGINE 200 MG: 100 TABLET ORAL at 03:02

## 2022-02-19 RX ADMIN — Medication 10 ML: at 05:02

## 2022-02-19 RX ADMIN — SODIUM CHLORIDE: 0.9 INJECTION, SOLUTION INTRAVENOUS at 01:02

## 2022-02-19 RX ADMIN — PHENOBARBITAL 194.4 MG: 32.4 TABLET ORAL at 03:02

## 2022-02-19 NOTE — HOSPITAL COURSE
Patients blood pressure better, weaned off  levo.No further vomiting, had loose stool once and then soft stools thereafter.Tolerating diet.Us showed gallstones but no wall thickening.He was continued on antibiotics for aspiration pneumonia.Started on protonix for hiccups and given prescription for thorazine for as needed hiccups.

## 2022-02-19 NOTE — PT/OT/SLP EVAL
"Speech Language Pathology Evaluation  Bedside Swallow    Patient Name:  Lamont Villarreal   MRN:  [id number]  Admitting Diagnosis: Aspiration pneumonia    Recommendations:                 General Recommendations:    1. Speech pathology to follow-up for ongoing assessment of oral and pharyngeal swallow function.   2. Pt is at high risk for silent aspiration. Recommend MBS either as inpatient or outpatient pending d/c planning.     Diet recommendations: Solids:  Mechanical soft, Finely chopped meat, Liquids: Thin     Aspiration Precautions:   1. Sit upright as much as possible  2. Slow rate of drinking  3. Minimize size of bite/sip  4. Monitor for s/s of aspiration or airway threat  5. Oral care as pt tolerates    General Precautions: Standard, aspiration    Communication strategies:  Pt is non-verbal, relies on caregiver to anticipate wants/needs    History:     Past Medical History:   Diagnosis Date    Developmental delay     Mental disorder     Seizures        Per chart review:  "HPI:  Mr. Lamont Villarreal is a 26 y.o. male, with PMH of epilepsy, Lennox-Gestault syndrome (needs near total assistance w/ ADLs, has chronic contractures), who has frequent hiccups occasionally with vomiting, started w/ poor appetite and worsening hiccups and vomiting yesterday. Associated symptoms included listlessness, and decreased appetite.This morning, when he awoke, his mother noted he had a fever, and brought him to the ED, where temp was 101.8F. In the ED, he was also noted to have hypotension, with SBP in 80s and O2 sats of 94% on RA. ED labs were without leukocytosis of left shift, but procalcitonin was elevated with concerns for aspiration PNA despite normal appearing CXR. He was treated with vanc/zosyn, and clindamycin. A central line was placed, and he was started on maintenance fluids and has since been more alert. He was transferred for ICU admission to [facility]. He is admitted to inpatient status."    Pt seen today for bedside " swallow eval: Pt's aunt at bedside provided hx. Aunt reports pt is on a mechanical soft solid diet with thin liquids at baseline. He requires total assist for meals. Reports pt has a good appetite. Family has had to downgrade diet to softer solids due to poor dentition, reports pt refuses oral care at home. SLP attempted to contact pt's caregiver, his mother, however, she was unable to speak at this time. Per Dr. Ricci, mother reports he eats well without difficulty, however, pt does have a hx of aspiration during seizure activity. SLP unable to locate any past speech tx or MBS records.       Subjective     · Pt reclined in bed, contracted. Aunt at bedside.     Respiratory Status: Room air, SpO2 sats at 100%     Objective:     Oral Musculature Evaluation  · Dentition: teeth in poor condition  · Secretion Management: adequate  · Oral Musculature Comments: Limited oral motor exam; face symmetrical at rest and with movement; noted open bite dentition with teeth in poor condition.    Bedside Swallow Eval:   Consistencies Assessed:  · Thin liquids 1/3tsp via spoon, 2-3ml via straw, cyclical straw sips  · Puree 1tsp boluses  · Soft solids x4 boluses of soft solids from lunch tray     Oral Phase:   · Able to achieve adequate oral opening to accept straw and spoon  · Utilized dentition for stripping of spoon  · Able to siphon liquids without difficulty; difficult to remove straw from pt's mouth once placed. Aunt reports pt typically drinks entirety of drink in cyclical sip  · Reduced bolus mastication due to open bite; however, functional   · Lingual pumping prior to a-p transport    Pharyngeal Phase:   · Trigger of the pharyngeal swallow appears to be delayed  · Pt with 2-3 catch-up swallows during sips of thin liquids  · No overt s/s of aspiration or airway threat during 100% of po trials- no coughing, choking, changes in breath stream. Spo2 level remained at 100% throughout session.     Treatment: Pt without overt s/s  of aspiration or airway threat this date, however, pt is at high risk for silent aspiration and continued development of aspiration PNA 2/2 hx of Lennox-Gastaut syndrome,  prior hospitalization for aspiration PNA, and poor oral care. SLP to follow-up with pt's primary caregiver for ongoing information regarding hx of dysphagia. Per Dr. Ricci, aspiration events have occurred in the past during seizure-activity. Recommend instrumental assessment of swallow function via MBS to determine safety of swallow. MBS can be scheduled as inpatient or outpatient pending d/c planning. Notified RN and Dr. Ricci of above.      Assessment:     Lamont Villarreal is a 26 y.o. male with an SLP diagnosis of Dysphagia.     Goals:   Multidisciplinary Problems     SLP Goals        Problem: SLP Goal    Goal Priority Disciplines Outcome   SLP Goal     SLP Ongoing, Progressing   Description: 1. Pt will consume mechanical soft solids with thin liquids without overt s/s of aspiration or airway threat given total caregiver assist.                   Plan:     · Patient to be seen:  2 x/week, 3 x/week   · Plan of Care expires:     · Plan of Care reviewed with:  other (see comments) (Aunt)   · SLP Follow-Up:  Yes       Discharge recommendations:  other (see comments) (TBD)       Time Tracking:     SLP Treatment Date:   02/19/22  Speech Start Time:  1250  Speech Stop Time:  1312     Speech Total Time (min):  22 min    Billable Minutes: Eval Swallow and Oral Function 22 min    02/19/2022

## 2022-02-19 NOTE — PROGRESS NOTES
[facility] Intensive Care [place] Hospital Medicine  Progress Note    Patient Name: Lamont Villarreal  MRN: [id number]  Patient Class: IP- Inpatient   Admission Date: 2/18/2022  Length of Stay: 1 days  Attending Physician: Mari Ricci MD  Primary Care Provider: Hollis Thomas MD        Subjective:     Principal Problem:Aspiration pneumonia        HPI:  Mr. Lamont Villarreal is a 26 y.o. male, with PMH of epilepsy, Lennox-Gestault syndrome (needs near total assistance w/ ADLs, has chronic contractures), who has frequent hiccups occasionally with vomiting, started w/ poor appetite and worsening hiccups and vomiting yesterday. Associated symptoms included listlessness, and decreased appetite.This morning, when he awoke, his mother noted he had a fever, and brought him to the ED, where temp was 101.8F. In the ED, he was also noted to have hypotension, with SBP in 80s and O2 sats of 94% on RA. ED labs were without leukocytosis of left shift, but procalcitonin was elevated with concerns for aspiration PNA despite normal appearing CXR. He was treated with vanc/zosyn, and clindamycin. A central line was placed, and he was started on maintenance fluids and has since been more alert. He was transferred for ICU admission to [facility] [place]. He is admitted to inpatient status.       Overview/Hospital Course:  Patients blood pressure better, weaning off  levo.      Interval History: looks comfortable, on room air, vitals stable.    Review of Systems   Unable to perform ROS: Patient nonverbal   Objective:     Vital Signs (Most Recent):  Temp: 97.7 °F (36.5 °C) (02/19/22 0330)  Pulse: 79 (02/19/22 1130)  Resp: 18 (02/19/22 1130)  BP: (!) 129/90 (02/19/22 1130)  SpO2: 100 % (02/19/22 1130)   Vital Signs (24h Range):  Temp:  [97.7 °F (36.5 °C)] 97.7 °F (36.5 °C)  Pulse:  [56-79] 79  Resp:  [11-21] 18  SpO2:  [94 %-100 %] 100 %  BP: ()/(47-90) 129/90     Weight: 43.9 kg (96 lb 12.5 oz)  Body mass index is 18.9 kg/m².    Intake/Output  Summary (Last 24 hours) at 2/19/2022 1329  Last data filed at 2/19/2022 1145  Gross per 24 hour   Intake 1459.97 ml   Output 1600 ml   Net -140.03 ml      Physical Exam  Vitals reviewed.   Constitutional:       General: He is not in acute distress.     Appearance: He is well-developed.   HENT:      Head: Atraumatic.   Eyes:      Extraocular Movements: Extraocular movements intact.      Pupils: Pupils are equal, round, and reactive to light.   Cardiovascular:      Rate and Rhythm: Normal rate and regular rhythm.   Pulmonary:      Effort: Pulmonary effort is normal. No respiratory distress.      Breath sounds: Normal breath sounds.   Abdominal:      General: There is no distension.      Palpations: Abdomen is soft.   Musculoskeletal:         General: No swelling. Normal range of motion.      Cervical back: Normal range of motion and neck supple.      Comments: Tends to keep extremities contracted when in bed   Skin:     General: Skin is warm.      Findings: No rash.   Neurological:      General: No focal deficit present.      Mental Status: He is alert.      Comments: Non verbal    Psychiatric:         Behavior: Behavior normal.       Significant Labs: All pertinent labs within the past 24 hours have been reviewed.    Significant Imaging: I have reviewed all pertinent imaging results/findings within the past 24 hours.      Assessment/Plan:      * Aspiration pneumonia  - Mr. Lamont Villarreal presents with vomiting episode followed by rattling in chest  and cough,  Fever,also reported  hiccups  - concern for aspiration PNA, has had it in past  - CXR without opacities or effusions, but overall history concerning for aspiration and procalcitonin is elevated   - s/p vanc/zosyn & cefepime in ED   - continue treatment with unasyn   - sputum cultures not collected, no cough currently  - Blood cx negative  - CURB-65 score: 2   - aspiration precautions   - soft mechanical diet at home  - speech recs   - check kub with  vomiting    Septic shock  Started on levophed, weaning off, bp better      Debility  Is ambulatory at baseline  Therapy consulted      Diarrhea  - patient's mother states he has been having watery, non-bloody diarrhea x 2 months since starting the new medication  - stool studies ordered       Sepsis  This patient does have evidence of infective focus  My overall impression is sepsis. Vital signs were reviewed and noted in progress note.  Antibiotics given-   Antibiotics (From admission, onward)            Start     Stop Route Frequency Ordered    02/19/22 0045  ampicillin-sulbactam 3 g in sodium chloride 0.9 % 100 mL IVPB (ready to mix system)         -- IV Every 6 hours (non-standard times) 02/18/22 2340        Cultures were taken-   Microbiology Results (last 7 days)     Procedure Component Value Units Date/Time    Clostridium difficile EIA [[id number]]     Order Status: No result Specimen: Stool     Stool culture [[id number]]     Order Status: No result Specimen: Stool     Culture, Respiratory with Gram Stain [[id number]]     Order Status: No result Specimen: Respiratory from Sputum         Latest lactate reviewed, they are-  Recent Labs   Lab 02/18/22  1154 02/18/22  2345   LACTATE 0.9 1.0       Organ dysfunction indicated by Encephalopathy  and Acute respiratory failure  Source- pulmonary     Source control Achieved by- antibiotics   Wbc improved, bp better      Developmental delay  Noted, non verbal       Epilepsy  - continue home meds: cannabidiol 6 mL BID, lamotrigine 200 mg BID, pheobarbital 64.8 mg x 3 tabs at bedtime, topiramate 200 mg BID   - per pharmacy cannabidiol can not be labeled for use at this time due to legal restrictions   - seizure precautions   - last seizure was about 1 month ago, tends to have about 1 seizure/month  - no seizure reported , usually has [place] seizure  - also phenobarbital level has been high in past and mother knows about it, no changes were recommended in past      VTE  Risk Mitigation (From admission, onward)         Ordered     enoxaparin injection 40 mg  Daily         02/19/22 0257     IP VTE HIGH RISK PATIENT  Once         02/19/22 0257     Place sequential compression device  Until discontinued         02/18/22 0322                Discharge Planning   MOHAN:      Code Status: Full Code   Is the patient medically ready for discharge?:     Reason for patient still in hospital (select all that apply): Patient trending condition and Treatment           Critical care time spent on patient 40 minutes.          Mari Ricci MD  Department of Hospital Medicine   [facility] - Intensive Care ([place])

## 2022-02-19 NOTE — PROGRESS NOTES
Pharmacokinetic Initial Assessment: IV Vancomycin    Assessment/Plan:    Initiate intravenous vancomycin with loading dose of 1000 mg once followed by a maintenance dose of vancomycin 500mg IV every 12 hours  Desired empiric serum trough concentration is 10 to 20 mcg/mL  Draw vancomycin trough level 60 min prior to fourth dose on 02/20/22 at approximately 0530  Pharmacy will continue to follow and monitor vancomycin.      Please contact pharmacy at extension [phone] with any questions regarding this assessment.     Thank you for the consult,   Lissett Limon       Patient brief summary:  Lamont Villarreal is a 26 y.o. male initiated on antimicrobial therapy with IV Vancomycin for treatment of suspected bacteremia    Drug Allergies:   Review of patient's allergies indicates:  No Known Allergies    Actual Body Weight:   43.9kg    Renal Function:   Estimated Creatinine Clearance: 77.9 mL/min (based on SCr of 0.9 mg/dL).,     Dialysis Method (if applicable):  N/A    CBC (last 72 hours):  Recent Labs   Lab Result Units 02/18/22  1154   WBC K/uL 8.78   Hemoglobin g/dL 12.9*   Hematocrit % 39.3*   Platelets K/uL 118*   Gran % % 83.6*   Lymph % % 6.8*   Mono % % 7.5   Eosinophil % % 1.5   Basophil % % 0.3   Differential Method  Automated       Metabolic Panel (last 72 hours):  Recent Labs   Lab Result Units 02/18/22  1154 02/18/22  1234   Sodium mmol/L 140  --    Potassium mmol/L 3.6  --    Chloride mmol/L 112*  --    CO2 mmol/L 20*  --    Glucose mg/dL 99  --    Glucose, UA   --  Negative   BUN mg/dL 9  --    Creatinine mg/dL 0.9  --    Albumin g/dL 3.9  --    Total Bilirubin mg/dL 0.5  --    Alkaline Phosphatase U/L 83  --    AST U/L 33  --    ALT U/L 29  --        Drug levels (last 3 results):  No results for input(s): VANCOMYCINRA, VANCOMYCINPE, VANCOMYCINTR in the last 72 hours.    Microbiologic Results:  Microbiology Results (last 7 days)     Procedure Component Value Units Date/Time    Group A Strep, Molecular  [[id number]] Collected: 02/18/22 1147    Order Status: Completed Specimen: Throat Updated: 02/18/22 1219     Group A Strep, Molecular Negative     Comment: Arcanobacterium haemolyticum and Beta Streptococcus group C   and G will not be detected by this test method.  Please order   Throat Culture ([id number]) if suspected.         Influenza A & B by Molecular [[id number]] Collected: 02/18/22 1147    Order Status: Completed Specimen: Nasopharyngeal Swab Updated: 02/18/22 1219     Influenza A, Molecular Negative     Influenza B, Molecular Negative     Flu A & B Source Nasal swab    Blood culture [[id number]] Collected: 02/18/22 1154    Order Status: Sent Specimen: Blood Updated: 02/18/22 1154    Blood culture [[id number]] Collected: 02/18/22 1154    Order Status: Sent Specimen: Blood Updated: 02/18/22 1154

## 2022-02-19 NOTE — ASSESSMENT & PLAN NOTE
- patient's mother states he has been having watery, non-bloody diarrhea x 2 months since starting the new medication  - stool studies ordered

## 2022-02-19 NOTE — PLAN OF CARE
Problem: Occupational Therapy Goal  Goal: Occupational Therapy Goal  Outcome: Ongoing, Progressing     OT evaluation complete. DC: home with  OT.     Goals to be met by: 2/19/22    Patient will increase functional independence with ADLs by performing:    Toileting from toilet with Maximum Assistance for hygiene and clothing management.   Toilet transfer to toilet with Minimal Assistance.

## 2022-02-19 NOTE — SUBJECTIVE & OBJECTIVE
Past Medical History:   Diagnosis Date    Developmental delay     Mental disorder     Seizures        No past surgical history on file.    Review of patient's allergies indicates:  No Known Allergies    Current Facility-Administered Medications on File Prior to Encounter   Medication    [COMPLETED] 0.9%  NaCl infusion    [COMPLETED] 0.9%  NaCl infusion    [COMPLETED] acetaminophen tablet 1,000 mg    [COMPLETED] ibuprofen tablet 800 mg    [COMPLETED] piperacillin-tazobactam 4.5 g in dextrose 5 % 100 mL IVPB (ready to mix system)    [COMPLETED] sodium chloride 0.9% bolus 1,000 mL    [COMPLETED] vancomycin in dextrose 5 % 1 gram/250 mL IVPB 1,000 mg    [DISCONTINUED] 0.9%  NaCl infusion    [DISCONTINUED] clindamycin in D5W 600 mg/50 mL IVPB 600 mg    [DISCONTINUED] NORepinephrine 4 mg in dextrose 5% 250 mL infusion (premix) (titrating)    [DISCONTINUED] ondansetron injection 4 mg    [DISCONTINUED] vancomycin - pharmacy to dose    [DISCONTINUED] vancomycin 500 mg in dextrose 5 % 100 mL IVPB (ready to mix system)     Current Outpatient Medications on File Prior to Encounter   Medication Sig    cannabidioL (EPIDIOLEX) 100 mg/mL Take 4 ml by mouth twice a day for seizures. (Patient taking differently: Take 4 ml by mouth twice a day for seizures.)    clonazePAM (KLONOPIN) 0.5 MG tablet Take 1 tablet (0.5 mg total) by mouth every evening.    diazePAM (VALIUM) 10 MG Tab One three times daily    lamoTRIgine (LAMICTAL) 200 MG tablet Take 1 tablet (200 mg total) by mouth 2 (two) times daily. Take one tablet twice a day.    PHENobarbitaL (LUMINAL) 64.8 MG tablet TAKE 3 TABLETS BY MOUTH ONCE DAILY AT BEDTIME. NEEDS APPOINTMENT    topiramate (TOPAMAX) 200 MG Tab Take 1 tablet (200 mg total) by mouth 2 (two) times daily.     Family History    None       Tobacco Use    Smoking status: Never Smoker    Smokeless tobacco: Never Used   Substance and Sexual Activity    Alcohol use: No    Drug use: Not on file    Sexual activity: Never      Review of Systems   Objective:     Vital Signs (Most Recent):  Temp: 97.7 °F (36.5 °C) (02/18/22 2325)  Pulse: (!) 59 (02/19/22 0030)  Resp: 15 (02/19/22 0030)  BP: 128/73 (02/19/22 0030)  SpO2: 98 % (02/19/22 0030)   Vital Signs (24h Range):  Temp:  [97.7 °F (36.5 °C)-101.8 °F (38.8 °C)] 97.7 °F (36.5 °C)  Pulse:  [56-94] 59  Resp:  [13-19] 15  SpO2:  [94 %-98 %] 98 %  BP: ()/(47-88) 128/73     Weight: 43.9 kg (96 lb 12.5 oz)  Body mass index is 18.9 kg/m².    Physical Exam  Vitals and nursing note reviewed.   Constitutional:       General: He is sleeping. He is not in acute distress.     Appearance: He is well-groomed and underweight. He is not ill-appearing, toxic-appearing or diaphoretic.   HENT:      Head: Normocephalic and atraumatic.      Right Ear: External ear normal.      Left Ear: External ear normal.   Eyes:      General:         Right eye: No discharge.         Left eye: No discharge.      Extraocular Movements: EOM normal.   Neck:      Vascular: No JVD.      Trachea: No tracheal deviation.   Cardiovascular:      Rate and Rhythm: Normal rate and regular rhythm.      Pulses: Normal pulses.      Heart sounds: Normal heart sounds. No murmur heard.    No gallop.   Pulmonary:      Effort: Pulmonary effort is normal. No respiratory distress.      Breath sounds: Normal breath sounds. No stridor. No wheezing or rales.   Abdominal:      General: Bowel sounds are normal. There is no distension.      Palpations: Abdomen is soft. There is no mass.      Tenderness: There is no abdominal tenderness. There is no guarding.   Musculoskeletal:      Cervical back: Normal range of motion and neck supple.   Skin:     General: Skin is warm and dry.   Neurological:      Cranial Nerves: No cranial nerve deficit.      Motor: No abnormal muscle tone.      Coordination: Coordination normal.      Comments: Patient non-verbal and sleeping. Can not complete full exam.          CRANIAL NERVES     CN III, IV, VI    Extraocular motions are normal.      Significant Labs: All pertinent labs within the past 24 hours have been reviewed.  BMP:   Recent Labs   Lab 02/18/22  2345   GLU 98      K 3.4*   *   CO2 18*   BUN 6   CREATININE 0.8   CALCIUM 7.9*     CBC:   Recent Labs   Lab 02/18/22  1154 02/18/22  2345   WBC 8.78 13.30*   HGB 12.9* 12.0*   HCT 39.3* 36.6*   * 126*     CMP:   Recent Labs   Lab 02/18/22  1154 02/18/22  2345    138   K 3.6 3.4*   * 112*   CO2 20* 18*   GLU 99 98   BUN 9 6   CREATININE 0.9 0.8   CALCIUM 7.8* 7.9*   PROT 6.3  --    ALBUMIN 3.9  --    BILITOT 0.5  --    ALKPHOS 83  --    AST 33  --    ALT 29  --    ANIONGAP 8 8   EGFRNONAA >60 >60     Lactic Acid:   Recent Labs   Lab 02/18/22  1154 02/18/22  2345   LACTATE 0.9 1.0     Respiratory Culture: No results for input(s): GSRESP, RESPIRATORYC in the last 48 hours.    Significant Imaging: I have reviewed all pertinent imaging results/findings within the past 24 hours.  X-Ray Chest 1 View  Order: [id number]  Status: Final result    Visible to patient: Yes (not seen)    Next appt: None    Dx: Encounter for central line placement    0 Result Notes    Details    Reading Physician Reading Date Result Priority   Hugh Moss MD  [phone]  [phone] 2/18/2022 STAT     Narrative & Impression  EXAMINATION:  XR CHEST 1 VIEW     CLINICAL HISTORY:  Encounter for adjustment and management of vascular access device     TECHNIQUE:  Single frontal view of the chest was performed.     COMPARISON:  02/18/2022.     FINDINGS:  Monitoring EKG leads are present.  The trachea is unremarkable.  The cardiomediastinal silhouette is within normal limits.  The hemidiaphragms are unremarkable.  There are no pleural effusions.  There is no evidence of a pneumothorax.  There is no evidence of pneumomediastinum.  No airspace opacity is present.  No vascular access device identified.     Impression:     No vascular access device in the chest.     No  acute intrathoracic process.        Electronically signed by: Hugh Moss MD  Date:                                            02/18/2022  Time:                                           18:31

## 2022-02-19 NOTE — PROVIDER TRANSFER
(Physician in Lead of Transfers)   Outside Transfer Acceptance Note / Regional Referral Center    Referring facility: [facility]   Referring provider: ZAC JAY  Accepting facility: [facility]  Accepting provider: SABRINA OVALLES  Reason for transfer:  Higher level of care  Transfer diagnosis:   Transfer specialty requested: Critical Care Medicine  Transfer specialty notified: Yes  Transfer level: 2  Isolation status: No active isolations   Admission class or status: IP- Inpatient      Narrative     Patient is a 26 y.o. male who has a past medical history of Developmental delay, Mental disorder, and Seizures presented to [facility] ED with fever and fatigue. On arrival to ED patient was found to be listless and hypotensive with SBP in 80's. Lab work up in ED was unremarkable. Chest xray unremarkable. Patient received 4L of NS with no improvement in BP. Patient then had central line placed and started on levophed. BP now improved. No clear source of infection but patient has history of aspiration. Started on IV Vanc and Zosyn for empiric coverage. Patient now improved and mentation has improved. Patient is stable for transfer.     Objective     Vitals: Temp: 98.8 °F (37.1 °C) (02/18/22 1252)  Pulse: 64 (02/18/22 1902)  Resp: 14 (02/18/22 1902)  BP: 103/67 (02/18/22 1902)  SpO2: 96 % (02/18/22 1902)    Recent Labs: see EPIC  CBC:  Recent Labs   Lab 02/18/22  1154   WBC 8.78   HGB 12.9*   HCT 39.3*   *     CMP:  Recent Labs   Lab 02/18/22  1154   CALCIUM 7.8*   ALBUMIN 3.9   PROT 6.3      K 3.6   CO2 20*   *   BUN 9   CREATININE 0.9   ALKPHOS 83   ALT 29   AST 33   BILITOT 0.5     Recent Labs   Lab 02/18/22  1154   LACTATE 0.9     Recent Labs   Lab 02/18/22  1154   CPK 53  53   CPKMB 0.2   TROPONINI <0.006   MB 0.4     BNP  Recent Labs   Lab 02/18/22  1154   BNP <10     ABG  No results for input(s): PH, PO2, PCO2, HCO3, BE in the last 168 hours.   Lab Results    Component Value Date    INR 1.2 02/28/2017    INR 1.2 04/25/2006       Recent imaging:   X-Ray Chest 1 View  Narrative: EXAMINATION:  XR CHEST 1 VIEW    CLINICAL HISTORY:  Encounter for adjustment and management of vascular access device    TECHNIQUE:  Single frontal view of the chest was performed.    COMPARISON:  02/18/2022.    FINDINGS:  Monitoring EKG leads are present.  The trachea is unremarkable.  The cardiomediastinal silhouette is within normal limits.  The hemidiaphragms are unremarkable.  There are no pleural effusions.  There is no evidence of a pneumothorax.  There is no evidence of pneumomediastinum.  No airspace opacity is present.  No vascular access device identified.  Impression: No vascular access device in the chest.    No acute intrathoracic process.    Electronically signed by: Hugh Moss MD  Date:    02/18/2022  Time:    18:31  X-Ray Chest 1 View  Narrative: EXAMINATION:  XR CHEST 1 VIEW    CLINICAL HISTORY:  Possible aspiration;    TECHNIQUE:  Single view chest dated February 18, 2022.    COMPARISON:  March 4, 2017.    FINDINGS:  Rotation limits the examination.  The cardiac silhouette is within normal limits.  No focal infiltrate or effusion.  No acute osseous abnormality.  Mild scoliosis.  Impression: No active lung disease.    Electronically signed by: Gordon Donato MD  Date:    02/18/2022  Time:    11:51      Airway:     Vent settings:     IV access:        Peripheral IV - Single Lumen 02/18/22 1122 20 G Left Forearm (Active)   Site Assessment Clean;Dry;Intact;No redness;No swelling;No drainage 02/18/22 1122   Line Status Blood return noted;Saline locked;Flushed 02/18/22 1122   Dressing Status Clean;Dry;Intact 02/18/22 1122     Allergies: Review of patient's allergies indicates:  No Known Allergies   NPO: No      Anticoagulation:   Anticoagulants     None           Instructions      [facility]  Admit to Hospital Medicine  Upon patient arrival to floor, please contact Hospital  Medicine on call.

## 2022-02-19 NOTE — PLAN OF CARE
Patient is non verbal. Aunt at bedside. [facility] contact patients mother Nolberto [phone] who completed discharge planning assessment.     Mother states the family moved two days ago and she does not remember the new address. Mother will provide to assigned CM at a later time.     Prior to admission patient is non verbal and requires assistance with all ADLS. Patients mother assist patient with all cares and plans to continue providing assistance to patient. Patient lives with his mother, two brothers and sisters and has 24 hour care provided by family. Patient is not linked with outpatient case management services. Patient would benefit from being linked to outpatient case management for persons with disabilities. Mother is agreeable to receiving resources.    Mother denies that patient has Dme int he home. Patient is not current with HH.      Patients PCP is correct on the face sheet. Patient choice pharmacy is [facility].       Patients family will transport the patient home at discharge.     Assigned CM team will continue to follow.                02/19/22 9464   Discharge Assessment   Assessment Type Discharge Planning Assessment   Confirmed/corrected address, phone number and insurance No   Reason Other (see comment)   Source of Information family   Communicated MOHAN with patient/caregiver Date not available/Unable to determine   Lives With sibling(s);parent(s)   Do you expect to return to your current living situation? Yes   Do you have help at home or someone to help you manage your care at home? Yes   Who are your caregiver(s) and their phone number(s)? mother   Prior to hospitilization cognitive status: Not Oriented to Person;Not Oriented to Place;Not Oriented to Time   Walking or Climbing Stairs Difficulty transferring difficulty, assistance 1 person   Dressing/Bathing Difficulty dressing difficulty, dependent   Home Layout Able to live on 1st floor   Equipment Currently Used at Home none    Readmission within 30 days? No   Patient currently being followed by outpatient case management? No   Do you currently have service(s) that help you manage your care at home? No   Do you have prescription coverage? Yes   Do you have any problems affording any of your prescribed medications? No   Is the patient taking medications as prescribed? yes   How do you get to doctors appointments? family or friend will provide   Are you on dialysis? No   Do you take coumadin? No   Discharge Plan A Home with family   Discharge Plan discussed with: Caregiver

## 2022-02-19 NOTE — PT/OT/SLP EVAL
Occupational Therapy   Evaluation    Name: Lamont Villarreal  MRN: [id number]  Admitting Diagnosis:  Aspiration pneumonia  Recent Surgery: * No surgery found *      Recommendations:     Discharge Recommendations: home, home health OT  Discharge Equipment Recommendations:  shower chair  Barriers to discharge:  None    Assessment:     Lamont Villarreal is a 26 y.o. male with a medical diagnosis of Aspiration pneumonia.  He presents with impaired endurance, impaired functional mobilty, decreased upper extremity function, decreased lower extremity function, abnormal tone, decreased ROM, impaired self care skills, weakness.      Rehab Prognosis: Fair; patient would benefit from acute skilled OT services to address these deficits and reach maximum level of function.       Plan:     Patient to be seen 2 x/week to address the above listed problems via self-care/home management, therapeutic activities, therapeutic exercises  · Plan of Care Expires: 03/21/22  · Plan of Care Reviewed with: patient, mother    Subjective     Chief Complaint: pt nonverbal  Patient/Family Comments/goals: mother goal to take patient home    Occupational Profile:  Living Environment: Lives with mother who is full time caregiver, [facility]. Walk in shower.  Previous level of function: Independent with bed mobility and functional ambulation. Mother denies hx of falls. Mother assists with all ADLs. Pt able to ambulate in the community but mother reports he needs a wheelchair.   Roles and Routines: Son  Equipment Used at Home:  none  Assistance upon Discharge: mother    Pain/Comfort:  · Pain Rating 1: 0/10 (mother states patient is unable to report pain)    Patients cultural, spiritual, Congregation conflicts given the current situation: no    Objective:     Communicated with: RN prior to session.  Patient found left sidelying with telemetry, pulse ox (continuous), blood pressure cuff (R fem central line) upon OT entry to room.    General Precautions: Standard, fall  "  Orthopedic Precautions:N/A   Braces: N/A  Respiratory Status: Room air    Occupational Performance:    Bed Mobility:    · Supine <> sitting min A x1 + 1 for line management. Mother reports pt mobilizes "when he wants to"  · Pt sat EOB with CGA for approx 10 minutes.     Functional Mobility/Transfers:  · Pt declined standing trials this date    Activities of Daily Living:  · Pt dependent with all ADLs at baseline.    Cognitive/Visual Perceptual:  Cognitive/Psychosocial Skills:     -       Oriented to: pt nonverbal, unable to assess   -       Follows Commands/attention:intermittent command following, responds well to visual cues  -       Communication: nonverbal, minimal eye contact    Physical Exam:  Postural examination/scapula alignment:    -       Rounded shoulders  -       Forward head  -       Posterior pelvic tilt  -       Kyphosis  Upper Extremity Range of Motion:     -       BUE contractures at baseline. Gross tripod grasp on therapist hand. AROM of B shoulder flexion approx 0-100 degrees. 30 degrees elbow extension. Pt prefers use of LUE.   Upper Extremity Strength:    -       At baseline with BUE contractures. Pt able to use BUEs to push up from side lying and scoot self up in bed.  Fine Motor Coordination:    -       Impaired  Left hand, manipulation of objects and Right hand, manipulation of objects  Gross motor coordination:   Pt able to reach and contact target with either hand with max visual and verbal cues.  Neurological: Pt with Lennox-Gastaut syndrome, at neurological baseline. Nonverbal with minimal command following. Participates as desires. Does respond to verbal and tactile cues. Restless with B hands during session requiring supervision for safety with lines.     AMPAC 6 Click ADL:  AMPAC Total Score: 7    Treatment & Education:  OT role, plan of care, progression of goals, importance of continued OOB activity, safety, discharge recommendation/plan.  Education:    Patient left supine with " all lines intact, RN notified and mother present    GOALS:   Multidisciplinary Problems     Occupational Therapy Goals        Problem: Occupational Therapy Goal    Goal Priority Disciplines Outcome Interventions   Occupational Therapy Goal     OT, PT/OT Ongoing, Progressing                    History:     Past Medical History:   Diagnosis Date    Developmental delay     Mental disorder     Seizures        No past surgical history on file.    Time Tracking:     OT Date of Treatment: 02/19/22  OT Start Time: 1024  OT Stop Time: 1041  OT Total Time (min): 17 min    Billable Minutes:Evaluation 7 minutes  Therapeutic Activity 10 minutes    Co evaluation with PT for session due to complex nature of pt and need for increased safety.  Two skilled therapists needed during functional mobility to decrease patient fall risk and decrease risk of caregiver injury.        2/19/2022

## 2022-02-19 NOTE — ASSESSMENT & PLAN NOTE
- patient's mother states he has been having watery, non-bloody diarrhea x 2 months   - stool studies ordered

## 2022-02-19 NOTE — HPI
Mr. Lamont Villarreal is a 26 y.o. male, with PMH of epilepsy, Lennox-Gestault syndrome (needs near total assistance w/ ADLs, has chronic contractures), who has frequent hiccups occasionally with vomiting, started w/ poor appetite and worsening hiccups and vomiting yesterday. Associated symptoms included listlessness, and decreased appetite.This morning, when he awoke, his mother noted he had a fever, and brought him to the ED, where temp was 101.8F. In the ED, he was also noted to have hypotension, with SBP in 80s and O2 sats of 94% on RA. ED labs were without leukocytosis of left shift, but procalcitonin was elevated with concerns for aspiration PNA despite normal appearing CXR. He was treated with vanc/zosyn, and clindamycin. A central line was placed, and he was started on maintenance fluids and has since been more alert. He was transferred for ICU admission to [facility]. He is admitted to inpatient status.

## 2022-02-19 NOTE — ASSESSMENT & PLAN NOTE
This patient does have evidence of infective focus  My overall impression is sepsis. Vital signs were reviewed and noted in progress note.  Antibiotics given-   Antibiotics (From admission, onward)            Start     Stop Route Frequency Ordered    02/19/22 0045  ampicillin-sulbactam 3 g in sodium chloride 0.9 % 100 mL IVPB (ready to mix system)         -- IV Every 6 hours (non-standard times) 02/18/22 2340        Cultures were taken-   Microbiology Results (last 7 days)     Procedure Component Value Units Date/Time    Culture, Respiratory with Gram Stain [[id number]]     Order Status: No result Specimen: Respiratory from Sputum         Latest lactate reviewed, they are-  Recent Labs   Lab 02/18/22  1154   LACTATE 0.9       Organ dysfunction indicated by Encephalopathy  and Acute respiratory failure  Source- pulmonary     Source control Achieved by- antibiotics

## 2022-02-19 NOTE — ED NOTES
Patient sleeping in ED stretcher. Bed in lowest position and locked, side rails up x 2, cardiac monitoring continued. Mother at the bedside. NADN at this time.

## 2022-02-19 NOTE — EICU
New Patient Evaluation    26 M history of developmental delay, seizure disorder, presented at [facility] ED with fever and generalized weakness. Hypotensive on presentation received 4L crystalloids without improvement now on norepinephrine.  Started empirically on vancomycin and piperacillin-tazobactam, now on ampicillin-sulbactam. Reportedly with nausea and vomiting as well as hiccups with concern for aspiration.    Patient seen not in distress  /87  HR 57  O2 98%    CXR without active lung disease  UA WBC 2  WBC 13, H/H 12/37, platelets 126  Na 138, K 3.4, Cl 112, CO2 18, creatinine 0.8  Lactic acid 1  Procalcitonin 0.48  EKG NSR unchanged    · Shock, septic more likely etiology unclear. Elevated procalcitonin and WBC. Follow culture. Consider CT abdomen if without clinical improvement to look for possible source.

## 2022-02-19 NOTE — H&P
[facility] Intensive Care [facility] Medicine  History & Physical    Patient Name: Lamont Villarreal  MRN: [id number]  Patient Class: IP- Inpatient  Admission Date: 2/18/2022  Attending Physician: Mari Ricci MD   Primary Care Provider: Hollis Thomas MD         Patient information was obtained from parent and ER records.     Subjective:     Principal Problem:Aspiration pneumonia    Chief Complaint: No chief complaint on file.       HPI: Mr. Lamont Villarreal is a 26 y.o. male, with PMH of epilepsy, Lennox-Gestault syndrome (needs near total assistance w/ ADLs, has chronic contractures), who has frequent hiccups occasionally with vomiting, started w/ poor appetite and worsening hiccups and vomiting yesterday. Associated symptoms included listlessness, and decreased appetite.This morning, when he awoke, his mother noted he had a fever, and brought him to the ED, where temp was 101.8F. In the ED, he was also noted to have hypotension, with SBP in 80s and O2 sats of 94% on RA. ED labs were without leukocytosis of left shift, but procalcitonin was elevated with concerns for aspiration PNA despite normal appearing CXR. He was treated with vanc/zosyn, and clindamycin. A central line was placed, and he was started on maintenance fluids and has since been more alert. He was transferred for ICU admission to [facility]. He is admitted to inpatient status.       Past Medical History:   Diagnosis Date    Developmental delay     Mental disorder     Seizures        No past surgical history on file.    Review of patient's allergies indicates:  No Known Allergies    Current Facility-Administered Medications on File Prior to Encounter   Medication    [COMPLETED] 0.9%  NaCl infusion    [COMPLETED] 0.9%  NaCl infusion    [COMPLETED] acetaminophen tablet 1,000 mg    [COMPLETED] ibuprofen tablet 800 mg    [COMPLETED] piperacillin-tazobactam 4.5 g in dextrose 5 % 100 mL IVPB (ready to mix system)    [COMPLETED] sodium chloride  0.9% bolus 1,000 mL    [COMPLETED] vancomycin in dextrose 5 % 1 gram/250 mL IVPB 1,000 mg    [DISCONTINUED] 0.9%  NaCl infusion    [DISCONTINUED] clindamycin in D5W 600 mg/50 mL IVPB 600 mg    [DISCONTINUED] NORepinephrine 4 mg in dextrose 5% 250 mL infusion (premix) (titrating)    [DISCONTINUED] ondansetron injection 4 mg    [DISCONTINUED] vancomycin - pharmacy to dose    [DISCONTINUED] vancomycin 500 mg in dextrose 5 % 100 mL IVPB (ready to mix system)     Current Outpatient Medications on File Prior to Encounter   Medication Sig    cannabidioL (EPIDIOLEX) 100 mg/mL Take 4 ml by mouth twice a day for seizures. (Patient taking differently: Take 4 ml by mouth twice a day for seizures.)    clonazePAM (KLONOPIN) 0.5 MG tablet Take 1 tablet (0.5 mg total) by mouth every evening.    diazePAM (VALIUM) 10 MG Tab One three times daily    lamoTRIgine (LAMICTAL) 200 MG tablet Take 1 tablet (200 mg total) by mouth 2 (two) times daily. Take one tablet twice a day.    PHENobarbitaL (LUMINAL) 64.8 MG tablet TAKE 3 TABLETS BY MOUTH ONCE DAILY AT BEDTIME. NEEDS APPOINTMENT    topiramate (TOPAMAX) 200 MG Tab Take 1 tablet (200 mg total) by mouth 2 (two) times daily.     Family History    None       Tobacco Use    Smoking status: Never Smoker    Smokeless tobacco: Never Used   Substance and Sexual Activity    Alcohol use: No    Drug use: Not on file    Sexual activity: Never     Review of Systems   Objective:     Vital Signs (Most Recent):  Temp: 97.7 °F (36.5 °C) (02/18/22 2325)  Pulse: (!) 59 (02/19/22 0030)  Resp: 15 (02/19/22 0030)  BP: 128/73 (02/19/22 0030)  SpO2: 98 % (02/19/22 0030)   Vital Signs (24h Range):  Temp:  [97.7 °F (36.5 °C)-101.8 °F (38.8 °C)] 97.7 °F (36.5 °C)  Pulse:  [56-94] 59  Resp:  [13-19] 15  SpO2:  [94 %-98 %] 98 %  BP: ()/(47-88) 128/73     Weight: 43.9 kg (96 lb 12.5 oz)  Body mass index is 18.9 kg/m².    Physical Exam  Vitals and nursing note reviewed.   Constitutional:        General: He is sleeping. He is not in acute distress.     Appearance: He is well-groomed and underweight. He is not ill-appearing, toxic-appearing or diaphoretic.   HENT:      Head: Normocephalic and atraumatic.      Right Ear: External ear normal.      Left Ear: External ear normal.   Eyes:      General:         Right eye: No discharge.         Left eye: No discharge.      Extraocular Movements: EOM normal.   Neck:      Vascular: No JVD.      Trachea: No tracheal deviation.   Cardiovascular:      Rate and Rhythm: Normal rate and regular rhythm.      Pulses: Normal pulses.      Heart sounds: Normal heart sounds. No murmur heard.    No gallop.   Pulmonary:      Effort: Pulmonary effort is normal. No respiratory distress.      Breath sounds: Normal breath sounds. No stridor. No wheezing or rales.   Abdominal:      General: Bowel sounds are normal. There is no distension.      Palpations: Abdomen is soft. There is no mass.      Tenderness: There is no abdominal tenderness. There is no guarding.   Musculoskeletal:      Cervical back: Normal range of motion and neck supple.   Skin:     General: Skin is warm and dry.   Neurological:      Cranial Nerves: No cranial nerve deficit.      Motor: No abnormal muscle tone.      Coordination: Coordination normal.      Comments: Patient non-verbal and sleeping. Can not complete full exam.          CRANIAL NERVES     CN III, IV, VI   Extraocular motions are normal.      Significant Labs: All pertinent labs within the past 24 hours have been reviewed.  BMP:   Recent Labs   Lab 02/18/22  2345   GLU 98      K 3.4*   *   CO2 18*   BUN 6   CREATININE 0.8   CALCIUM 7.9*     CBC:   Recent Labs   Lab 02/18/22  1154 02/18/22  2345   WBC 8.78 13.30*   HGB 12.9* 12.0*   HCT 39.3* 36.6*   * 126*     CMP:   Recent Labs   Lab 02/18/22  1154 02/18/22  2345    138   K 3.6 3.4*   * 112*   CO2 20* 18*   GLU 99 98   BUN 9 6   CREATININE 0.9 0.8   CALCIUM 7.8* 7.9*   PROT  6.3  --    ALBUMIN 3.9  --    BILITOT 0.5  --    ALKPHOS 83  --    AST 33  --    ALT 29  --    ANIONGAP 8 8   EGFRNONAA >60 >60     Lactic Acid:   Recent Labs   Lab 02/18/22  1154 02/18/22  2345   LACTATE 0.9 1.0     Respiratory Culture: No results for input(s): GSRESP, RESPIRATORYC in the last 48 hours.    Significant Imaging: I have reviewed all pertinent imaging results/findings within the past 24 hours.  X-Ray Chest 1 View  Order: [id number]   Status: Final result     Visible to patient: Yes (not seen)     Next appt: None     Dx: Encounter for central line placement     0 Result Notes    Details    Reading Physician Reading Date Result Priority   Hugh Moss MD  [phone]  [phone] 2/18/2022 STAT     Narrative & Impression  EXAMINATION:  XR CHEST 1 VIEW     CLINICAL HISTORY:  Encounter for adjustment and management of vascular access device     TECHNIQUE:  Single frontal view of the chest was performed.     COMPARISON:  02/18/2022.     FINDINGS:  Monitoring EKG leads are present.  The trachea is unremarkable.  The cardiomediastinal silhouette is within normal limits.  The hemidiaphragms are unremarkable.  There are no pleural effusions.  There is no evidence of a pneumothorax.  There is no evidence of pneumomediastinum.  No airspace opacity is present.  No vascular access device identified.     Impression:     No vascular access device in the chest.     No acute intrathoracic process.        Electronically signed by: Hugh Moss MD  Date:                                            02/18/2022  Time:                                           18:31          Assessment/Plan:     * Aspiration pneumonia  - Mr. Lamont Villarreal presents with fever, hiccups, nausea and vomiting & concern for aspiration PNA  - CXR without opacities or effusions, but overall history concerning for aspiration and procalcitonin is elevated   - s/p vanc/zosyn & cefepime in ED   - continue treatment with unasyn   - sputum cultures  ordered   - CURB-65 score: 2   - aspiration precautions     Sepsis  This patient does have evidence of infective focus  My overall impression is sepsis. Vital signs were reviewed and noted in progress note.  Antibiotics given-   Antibiotics (From admission, onward)            Start     Stop Route Frequency Ordered    02/19/22 0045  ampicillin-sulbactam 3 g in sodium chloride 0.9 % 100 mL IVPB (ready to mix system)         -- IV Every 6 hours (non-standard times) 02/18/22 2340        Cultures were taken-   Microbiology Results (last 7 days)     Procedure Component Value Units Date/Time    Culture, Respiratory with Gram Stain [[id number]]     Order Status: No result Specimen: Respiratory from Sputum         Latest lactate reviewed, they are-  Recent Labs   Lab 02/18/22  1154   LACTATE 0.9       Organ dysfunction indicated by Encephalopathy  and Acute respiratory failure  Source- pulmonary     Source control Achieved by- antibiotics         Epilepsy  - continue home meds: cannabidiol 6 mL BID, lamotrigine 200 mg BID, pheobarbital 64.8 mg x 3 tabs at bedtime, topiramate 200 mg BID   - per pharmacy cannabidiol can not be labeled for use at this time due to legal restrictions    - last dose was this evening, will defer to Clinical Pharmacist +/- obtain a Neuro consult for further recs  - seizure precautions   - due to reported decreased mental status, EEG ordered   - accuchecks [id number]  - last seizure was about 1 month ago, tends to have about 1 seizure/month    Diarrhea  - patient's mother states he has been having watery, non-bloody diarrhea x 2 months   - stool studies ordered       VTE Risk Mitigation (From admission, onward)         Ordered     enoxaparin injection 40 mg  Daily         02/19/22 0257     IP VTE HIGH RISK PATIENT  Once         02/19/22 0257     Place sequential compression device  Until discontinued         02/18/22 9628                   Asha Zafar PA-C  Department of Hospital Medicine    [facility] - Intensive [facility] ([place])

## 2022-02-19 NOTE — ASSESSMENT & PLAN NOTE
- Mr. Lamont Villarreal presents with vomiting episode followed by rattling in chest  and cough,  Fever,also reported  hiccups  - concern for aspiration PNA, has had it in past  - CXR without opacities or effusions, but overall history concerning for aspiration and procalcitonin is elevated   - s/p vanc/zosyn & cefepime in ED   - continue treatment with unasyn   - sputum cultures not collected, no cough currently  - Blood cx negative  - CURB-65 score: 2   - aspiration precautions   - soft mechanical diet at home  - speech recs   - check kub with vomiting

## 2022-02-19 NOTE — PT/OT/SLP EVAL
Physical Therapy Evaluation    Patient Name:  Lamont Villarreal   MRN:  [id number]    Recommendations:     Discharge Recommendations:  home health PT   Discharge Equipment Recommendations: wheelchair   Barriers to discharge: None    Assessment:     Lamont Villarreal is a 26 y.o. male admitted with a medical diagnosis of Aspiration pneumonia.  He presents with the following impairments/functional limitations:  weakness, impaired endurance, impaired functional mobilty, gait instability, impaired balance, abnormal tone, decreased ROM Patient presented with mod seated balance challenges and difficulty with command following creating a challenge for skilled PT at this time. He was able to respond to tactile cues but was not able to stand at this visit despite cues. His mother was present and reported that he will get up and mvoe around when he wants to but she was not able to describe any tells or ways he communicates wanting to walk or mobilize. We discussed homehealth PT and will suggest upon DC for assistance.    Rehab Prognosis: Fair; patient would benefit from acute skilled PT services to address these deficits and reach maximum level of function.    Recent Surgery: * No surgery found *      Plan:     During this hospitalization, patient to be seen 2 x/week to address the identified rehab impairments via wheelchair management/training, therapeutic activities, therapeutic exercises, neuromuscular re-education, gait training and progress toward the following goals:    · Plan of Care Expires:  03/19/22    Subjective     Chief Complaint: none reported  Patient/Family Comments/goals: none reported  Pain/Comfort:  Pain Rating 1:  (unable to report)  Pain Addressed 1: Nurse notified    Patients cultural, spiritual, [employer] conflicts given the current situation:      Living Environment:  Lives with mother in 1 level home with 1 step to enter  Prior to admission, patients level of function was dependent on mother.  Equipment used  at home: none.  DME owned (not currently used): none.  Upon discharge, patient will have assistance from mother.    Objective:     Communicated with NSG and MD prior to session.  Patient found left sidelying with blood pressure cuff, central line, Condom Catheter, SCD, telemetry  upon PT entry to room.    General Precautions: Standard, aspiration   Orthopedic Precautions:N/A   Braces: N/A  Respiratory Status: Room air    Exams:  · RLE ROM: unable to assess secondary to decreased command following  · RLE Strength: unable to assess secondary to decreased command following  · LLE ROM: unable to assess secondary to decreased command following  · LLE Strength: unable to assess secondary to decreased command following    Functional Mobility:  · Bed Mobility:  Scooting: moderate assistance  · Supine to Sit: minimum assistance  · Sit to Supine: minimum assistance    Therapeutic Activities and Exercises:   dicussed range of motion with family    AM-PAC 6 CLICK MOBILITY  Total Score:8     Patient left left sidelying with all lines intact, NSG notified and mother present.    GOALS:   Multidisciplinary Problems     Physical Therapy Goals        Problem: Physical Therapy Goal    Goal Priority Disciplines Outcome Goal Variances Interventions   Physical Therapy Goal     PT, PT/OT Ongoing, Progressing                 1. Supine to sit with MInimal Assistance  2. Sit to supine with MInimal Assistance  3. Rolling to Left and Right with Minimal Assistance.  4. Sit to stand transfer with Minimal Assistance    History:     Past Medical History:   Diagnosis Date    Developmental delay     Mental disorder     Seizures        No past surgical history on file.    Time Tracking:     PT Received On: 02/19/22  PT Start Time: 1024     PT Stop Time: 1042  PT Total Time (min): 18 min     Billable Minutes: Evaluation 1 Mod      02/19/2022

## 2022-02-19 NOTE — ASSESSMENT & PLAN NOTE
- continue home meds: cannabidiol 6 mL BID, lamotrigine 200 mg BID, pheobarbital 64.8 mg x 3 tabs at bedtime, topiramate 200 mg BID   - per pharmacy cannabidiol can not be labeled for use at this time due to legal restrictions   - seizure precautions   - last seizure was about 1 month ago, tends to have about 1 seizure/month  - no seizure reported , usually has [place] seizure  - also phenobarbital level has been high in past and mother knows about it, no changes were recommended in past

## 2022-02-19 NOTE — PLAN OF CARE
Problem: SLP Goal  Goal: SLP Goal  Description: 1. Pt will consume mechanical soft solids with thin liquids without overt s/s of aspiration or airway threat given total caregiver assist.  Outcome: Ongoing, Progressing    Bedside swallow evaluation completed this date. SLP to follow-up for ongoing assessment of oral and pharyngeal swallow function.

## 2022-02-19 NOTE — ASSESSMENT & PLAN NOTE
- continue home meds: cannabidiol 6 mL BID, lamotrigine 200 mg BID, pheobarbital 64.8 mg x 3 tabs at bedtime, topiramate 200 mg BID   - per pharmacy cannabidiol can not be labeled for use at this time due to legal restrictions    - last dose was this evening, will defer to Clinical Pharmacist +/- obtain a Neuro consult for further recs  - seizure precautions   - due to reported decreased mental status, EEG ordered   - accuchecks [id number]  - last seizure was about 1 month ago, tends to have about 1 seizure/month

## 2022-02-19 NOTE — ED PROVIDER NOTES
Encounter Date: 2/18/2022       History     Chief Complaint   Patient presents with    Fever     Pt arrives per EMS with mom with c/o fever since 9 am. Mom also reports decrease in appetite.     Lamont Villarreal is a 26 y.o. male presents with hypotension and fatigue today  Patient had fever this morning, has had hiccups and nausea vomiting since last night  Mother is concerned the patient may have aspirated which is happened in the past  Patient on exam is listless with rhonchi at the bilateral bases on exam today  Patient is hypotensive and febrile but does not look overtly septic on exam  Mother states the patient has had poor oral intake since this morning at home        Review of patient's allergies indicates:  No Known Allergies  Past Medical History:   Diagnosis Date    Developmental delay     Mental disorder     Seizures      History reviewed. No pertinent surgical history.  History reviewed. No pertinent family history.  Social History     Tobacco Use    Smoking status: Never Smoker    Smokeless tobacco: Never Used   Substance Use Topics    Alcohol use: No     Review of Systems   Constitutional: Positive for fatigue and fever. Negative for diaphoresis.   HENT: Negative for ear pain, hearing loss and tinnitus.    Eyes: Negative for pain and redness.   Respiratory: Positive for cough. Negative for shortness of breath and wheezing.    Cardiovascular: Negative for chest pain.   Gastrointestinal: Negative for abdominal pain, constipation, diarrhea, nausea and rectal pain.   Musculoskeletal: Negative for back pain, neck pain and neck stiffness.   Neurological: Positive for weakness. Negative for dizziness, seizures, numbness and headaches.   Psychiatric/Behavioral: Negative for confusion, decreased concentration and dysphoric mood.   All other systems reviewed and are negative.      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   Eyes: EOM are normal. Pupils are equal, round, and reactive to light.   Neck:   Normal range of motion.  Cardiovascular: Normal rate and regular rhythm.   Pulmonary/Chest:   Coarse breath sounds at the bilateral bases   Abdominal: Abdomen is soft.   Musculoskeletal:         General: Normal range of motion.      Cervical back: Normal range of motion.     Neurological: He is alert. He has normal strength.   Skin: Skin is warm and dry. Capillary refill takes less than 2 seconds.   Poor skin turgor         ED Course   Procedures  Labs Reviewed   PROCALCITONIN - Abnormal; Notable for the following components:       Result Value    Procalcitonin 0.48 (*)     All other components within normal limits   COMPREHENSIVE METABOLIC PANEL - Abnormal; Notable for the following components:    Chloride 112 (*)     CO2 20 (*)     Calcium 7.8 (*)     All other components within normal limits   CBC W/ AUTO DIFFERENTIAL - Abnormal; Notable for the following components:    RBC 4.09 (*)     Hemoglobin 12.9 (*)     Hematocrit 39.3 (*)     MCH 31.5 (*)     Platelets 118 (*)     Lymph # 0.6 (*)     Gran % 83.6 (*)     Lymph % 6.8 (*)     All other components within normal limits   URINALYSIS, REFLEX TO URINE CULTURE - Abnormal; Notable for the following components:    Occult Blood UA 2+ (*)     All other components within normal limits    Narrative:     Specimen Source->Urine   URINALYSIS MICROSCOPIC - Abnormal; Notable for the following components:    RBC, UA 10 (*)     Bacteria Few (*)     All other components within normal limits    Narrative:     Specimen Source->Urine   INFLUENZA A & B BY MOLECULAR   GROUP A STREP, MOLECULAR   CULTURE, BLOOD   CULTURE, BLOOD   LACTIC ACID, PLASMA   B-TYPE NATRIURETIC PEPTIDE   CK-MB   CK   TROPONIN I   SARS-COV-2 RNA AMPLIFICATION, QUAL     EKG Readings:  (Independently Interpreted)   Initial Reading: No STEMI. Rhythm: Normal Sinus Rhythm. Ectopy: No Ectopy. Axis: Normal.   Normal sinus rhythm at 60 beats per minute without acute arrhythmia or abnormality     ECG Results          EKG 12-lead (Final result)  Result time 02/18/22 12:37:45    Final result by Interface, Lab In [facility] (02/18/22 12:37:45)                 Narrative:    Test Reason : R05.9    Vent. Rate : 094 BPM     Atrial Rate : 094 BPM     P-R Int : 146 ms          QRS Dur : 108 ms      QT Int : 336 ms       P-R-T Axes : 081 094 075 degrees     QTc Int : 420 ms    Normal sinus rhythm  Rightward axis  Nonspecific T wave abnormality  Abnormal ECG  When compared with ECG of 28-FEB-2017 15:35,  No significant change was found  Confirmed by Francisco Lewis MD ([id number]) on 2/18/2022 12:37:39 PM    Referred By: [email or web address]   SELF           Confirmed By:Francisco Lewis MD                            Imaging Results          X-Ray Chest 1 View (Final result)  Result time 02/18/22 18:31:27    Final result by Hugh Moss MD (02/18/22 18:31:27)                 Impression:      No vascular access device in the chest.    No acute intrathoracic process.      Electronically signed by: Hugh Moss MD  Date:    02/18/2022  Time:    18:31             Narrative:    EXAMINATION:  XR CHEST 1 VIEW    CLINICAL HISTORY:  Encounter for adjustment and management of vascular access device    TECHNIQUE:  Single frontal view of the chest was performed.    COMPARISON:  02/18/2022.    FINDINGS:  Monitoring EKG leads are present.  The trachea is unremarkable.  The cardiomediastinal silhouette is within normal limits.  The hemidiaphragms are unremarkable.  There are no pleural effusions.  There is no evidence of a pneumothorax.  There is no evidence of pneumomediastinum.  No airspace opacity is present.  No vascular access device identified.                               X-Ray Chest 1 View (Final result)  Result time 02/18/22 11:51:52    Final  result by Gordon Donaot MD (02/18/22 11:51:52)                 Impression:      No active lung disease.      Electronically signed by: Gordon Donato MD  Date:    02/18/2022  Time:    11:51             Narrative:    EXAMINATION:  XR CHEST 1 VIEW    CLINICAL HISTORY:  Possible aspiration;    TECHNIQUE:  Single view chest dated February 18, 2022.    COMPARISON:  March 4, 2017.    FINDINGS:  Rotation limits the examination.  The cardiac silhouette is within normal limits.  No focal infiltrate or effusion.  No acute osseous abnormality.  Mild scoliosis.                                 Medications   ondansetron injection 4 mg (4 mg Intravenous Not Given 2/18/22 1130)   0.9%  NaCl infusion (1,000 mLs Intravenous New Bag 2/18/22 1749)   NORepinephrine 4 mg in dextrose 5% 250 mL infusion (premix) (titrating) (0.04 mcg/kg/min × 43.9 kg Intravenous Rate/Dose Change 2/18/22 1800)   vancomycin - pharmacy to dose (has no administration in time range)   clindamycin in D5W 600 mg/50 mL IVPB 600 mg (600 mg Intravenous New Bag 2/18/22 1835)   vancomycin in dextrose 5 % 1 gram/250 mL IVPB 1,000 mg (has no administration in time range)   vancomycin 500 mg in dextrose 5 % 100 mL IVPB (ready to mix system) (has no administration in time range)   0.9%  NaCl infusion ( Intravenous Stopped 2/18/22 1330)   0.9%  NaCl infusion ( Intravenous Stopped 2/18/22 1230)   ibuprofen tablet 800 mg (800 mg Oral Given 2/18/22 1140)   acetaminophen tablet 1,000 mg (1,000 mg Oral Given 2/18/22 1139)   sodium chloride 0.9% bolus 1,000 mL (0 mLs Intravenous Stopped 2/18/22 1640)   piperacillin-tazobactam 4.5 g in dextrose 5 % 100 mL IVPB (ready to mix system) (0 g Intravenous Stopped 2/18/22 1816)     Central Line  -- Performed by: Simone Lock MD  -- Date/Time: 6:41 PM 2/18/2022   -- Time out at 6:41 PM (correct patient, site, procedure, position)  -- Consent Done: Emergent Situation  -- Indications: vascular access  -- Anesthesia: local  infiltration  -- Local anesthetic: lidocaine 1% without epinephrine  -- Anesthetic total: 5 ml  -- Preparation: skin prepped with ChloraPrep  -- Location details:  Right femoral line  -- Catheter type: triple lumen  -- Catheter size: 7.5 Fr  -- Number of attempts: 2 (1st attempt was right IJ, unsuccessful)  -- Post-procedure: line sutured  -- Complications: none    Critical Care ED Physician Time (minutes):  -- Performed by: Simone Lock M.D.  -- Date/Time: 6:41 PM 2/18/2022   -- Direct Patient Care (Face Time): 5  -- Additional History from Records or Taking Additional History: 5  -- Ordering, Reviewing, and Interpreting Diagnostic Studies: 5  -- Total Time in Documentation: 11  -- Consultation with Other Physicians: 5  -- Consultation with Family Related to Condition: 0  -- Total Critical Care Time: 31  -- Critical care was necessary to treat hypotension/dehydration/pressors  -- Critical care was time spent personally by me on the following activities:   -- blood draw for specimens discussions with consultants,   -- development of treatment plan with patient or surrogate,   -- examination of patient, ordering and performing treatments   -- review of radiographic studies, re-evaluation of pt's condition  -- review of labs and evaluation of response to treatment    Medical Decision Making:   Initial Assessment:   Patient presents listless and febrile after possibly aspirating last night  Hiccups and nausea vomiting last night with possible aspiration issues  Patient has a history of aspiration, has several congenital abnormality    Differential Diagnosis:   Sepsis, UTI, pneumonia, aspiration, viral gastroenteritis, dehydration    Clinical Tests:   Lab Tests: Ordered and Reviewed  Radiological Study: Ordered and Reviewed  Medical Tests: Ordered and Reviewed    ED Management:  This patient presents hypotensive and febrile with normal lab work in the ER  Patient was given several rounds of IV fluids with continued  hypotension  Patient with fever, consider aspiration and differential diagnosis today  Patient given IV vancomycin IV Zosyn with clindamycin for aspiration coverage  Patient's blood pressure did not improve with IV fluids, central line placed in the ER  Pressors initiated at this time, patient needs to be transferred to an ICU bed ASAP                      Clinical Impression:   Final diagnoses:  [R05.9] Cough (Primary)  [I95.9] Hypotension, unspecified hypotension type  [E86.0] Dehydration  [Z45.2] Encounter for central line placement  [J69.0] Aspiration pneumonia, unspecified aspiration pneumonia type, unspecified laterality, unspecified part of lung          ED Disposition Condition    Transfer to Another Facility Stable              Simone Lock MD  02/18/22 6935

## 2022-02-19 NOTE — PLAN OF CARE
Problem: Physical Therapy Goal  Goal: Physical Therapy Goal  Outcome: Ongoing, Progressing     Goals to be met by: 3/19/2022     Patient will increase functional independence with mobility by performin. Supine to sit with MInimal Assistance  2. Sit to supine with MInimal Assistance  3. Rolling to Left and Right with Minimal Assistance.  4. Sit to stand transfer with Minimal Assistance

## 2022-02-19 NOTE — ASSESSMENT & PLAN NOTE
- Mr. Lamont Villarreal presents with fever, hiccups, nausea and vomiting & concern for aspiration PNA  - CXR without opacities or effusions, but overall history concerning for aspiration and procalcitonin is elevated   - s/p vanc/zosyn & cefepime in ED   - continue treatment with unasyn   - sputum cultures ordered   - CURB-65 score: 2   - aspiration precautions

## 2022-02-19 NOTE — ASSESSMENT & PLAN NOTE
This patient does have evidence of infective focus  My overall impression is sepsis. Vital signs were reviewed and noted in progress note.  Antibiotics given-   Antibiotics (From admission, onward)            Start     Stop Route Frequency Ordered    02/19/22 0045  ampicillin-sulbactam 3 g in sodium chloride 0.9 % 100 mL IVPB (ready to mix system)         -- IV Every 6 hours (non-standard times) 02/18/22 2340        Cultures were taken-   Microbiology Results (last 7 days)     Procedure Component Value Units Date/Time    Clostridium difficile EIA [[id number]]     Order Status: No result Specimen: Stool     Stool culture [[id number]]     Order Status: No result Specimen: Stool     Culture, Respiratory with Gram Stain [[id number]]     Order Status: No result Specimen: Respiratory from Sputum         Latest lactate reviewed, they are-  Recent Labs   Lab 02/18/22  1154 02/18/22  2345   LACTATE 0.9 1.0       Organ dysfunction indicated by Encephalopathy  and Acute respiratory failure  Source- pulmonary     Source control Achieved by- antibiotics   Wbc improved, bp better

## 2022-02-19 NOTE — NURSING
D: pt admitted from [facility] via EMS. Pt is non verbal, but will follow some commands. Pt MAEW. tlcvl to R fem, bleeding. A: sterile dressing change done. Ns at 125cc/h, levophed per MAR. Pt voided a very large volume prior to tx from stretcher. Condom cath placed.  bilat scds placed. Pt's mother at BS. No acute signs of distress noted.

## 2022-02-19 NOTE — SUBJECTIVE & OBJECTIVE
Interval History: looks comfortable, on room air, vitals stable.    Review of Systems   Unable to perform ROS: Patient nonverbal   Objective:     Vital Signs (Most Recent):  Temp: 97.7 °F (36.5 °C) (02/19/22 0330)  Pulse: 79 (02/19/22 1130)  Resp: 18 (02/19/22 1130)  BP: (!) 129/90 (02/19/22 1130)  SpO2: 100 % (02/19/22 1130)   Vital Signs (24h Range):  Temp:  [97.7 °F (36.5 °C)] 97.7 °F (36.5 °C)  Pulse:  [56-79] 79  Resp:  [11-21] 18  SpO2:  [94 %-100 %] 100 %  BP: ()/(47-90) 129/90     Weight: 43.9 kg (96 lb 12.5 oz)  Body mass index is 18.9 kg/m².    Intake/Output Summary (Last 24 hours) at 2/19/2022 1329  Last data filed at 2/19/2022 1145  Gross per 24 hour   Intake 1459.97 ml   Output 1600 ml   Net -140.03 ml      Physical Exam  Vitals reviewed.   Constitutional:       General: He is not in acute distress.     Appearance: He is well-developed.   HENT:      Head: Atraumatic.   Eyes:      Extraocular Movements: Extraocular movements intact.      Pupils: Pupils are equal, round, and reactive to light.   Cardiovascular:      Rate and Rhythm: Normal rate and regular rhythm.   Pulmonary:      Effort: Pulmonary effort is normal. No respiratory distress.      Breath sounds: Normal breath sounds.   Abdominal:      General: There is no distension.      Palpations: Abdomen is soft.   Musculoskeletal:         General: No swelling. Normal range of motion.      Cervical back: Normal range of motion and neck supple.      Comments: Tends to keep extremities contracted when in bed   Skin:     General: Skin is warm.      Findings: No rash.   Neurological:      General: No focal deficit present.      Mental Status: He is alert.      Comments: Non verbal    Psychiatric:         Behavior: Behavior normal.       Significant Labs: All pertinent labs within the past 24 hours have been reviewed.    Significant Imaging: I have reviewed all pertinent imaging results/findings within the past 24 hours.

## 2022-02-20 LAB
ALBUMIN SERPL BCP-MCNC: 3.4 G/DL (ref 3.5–5.2)
ALP SERPL-CCNC: 80 U/L (ref 55–135)
ALT SERPL W/O P-5'-P-CCNC: 22 U/L (ref 10–44)
ANION GAP SERPL CALC-SCNC: 8 MMOL/L (ref 8–16)
AST SERPL-CCNC: 17 U/L (ref 10–40)
BASOPHILS # BLD AUTO: 0.04 K/UL (ref 0–0.2)
BASOPHILS NFR BLD: 0.5 % (ref 0–1.9)
BILIRUB SERPL-MCNC: 0.2 MG/DL (ref 0.1–1)
BUN SERPL-MCNC: 4 MG/DL (ref 6–20)
CALCIUM SERPL-MCNC: 7.7 MG/DL (ref 8.7–10.5)
CHLORIDE SERPL-SCNC: 112 MMOL/L (ref 95–110)
CO2 SERPL-SCNC: 20 MMOL/L (ref 23–29)
CREAT SERPL-MCNC: 0.8 MG/DL (ref 0.5–1.4)
DIFFERENTIAL METHOD: ABNORMAL
EOSINOPHIL # BLD AUTO: 0.8 K/UL (ref 0–0.5)
EOSINOPHIL NFR BLD: 9.9 % (ref 0–8)
ERYTHROCYTE [DISTWIDTH] IN BLOOD BY AUTOMATED COUNT: 13.6 % (ref 11.5–14.5)
EST. GFR  (AFRICAN AMERICAN): >60 ML/MIN/1.73 M^2
EST. GFR  (NON AFRICAN AMERICAN): >60 ML/MIN/1.73 M^2
GLUCOSE SERPL-MCNC: 96 MG/DL (ref 70–110)
HCT VFR BLD AUTO: 36 % (ref 40–54)
HGB BLD-MCNC: 12 G/DL (ref 14–18)
IMM GRANULOCYTES # BLD AUTO: 0.01 K/UL (ref 0–0.04)
IMM GRANULOCYTES NFR BLD AUTO: 0.1 % (ref 0–0.5)
LYMPHOCYTES # BLD AUTO: 2.1 K/UL (ref 1–4.8)
LYMPHOCYTES NFR BLD: 27.6 % (ref 18–48)
MAGNESIUM SERPL-MCNC: 1.8 MG/DL (ref 1.6–2.6)
MCH RBC QN AUTO: 31.6 PG (ref 27–31)
MCHC RBC AUTO-ENTMCNC: 33.3 G/DL (ref 32–36)
MCV RBC AUTO: 95 FL (ref 82–98)
MONOCYTES # BLD AUTO: 0.5 K/UL (ref 0.3–1)
MONOCYTES NFR BLD: 6.1 % (ref 4–15)
NEUTROPHILS # BLD AUTO: 4.3 K/UL (ref 1.8–7.7)
NEUTROPHILS NFR BLD: 55.8 % (ref 38–73)
NRBC BLD-RTO: 0 /100 WBC
PHOSPHATE SERPL-MCNC: 2.7 MG/DL (ref 2.7–4.5)
PLATELET # BLD AUTO: 107 K/UL (ref 150–450)
PLATELET BLD QL SMEAR: ABNORMAL
PMV BLD AUTO: 9.7 FL (ref 9.2–12.9)
POTASSIUM SERPL-SCNC: 3.3 MMOL/L (ref 3.5–5.1)
PROT SERPL-MCNC: 5.7 G/DL (ref 6–8.4)
RBC # BLD AUTO: 3.8 M/UL (ref 4.6–6.2)
SODIUM SERPL-SCNC: 140 MMOL/L (ref 136–145)
WBC # BLD AUTO: 7.65 K/UL (ref 3.9–12.7)

## 2022-02-20 PROCEDURE — 85025 COMPLETE CBC W/AUTO DIFF WBC: CPT | Performed by: PHYSICIAN ASSISTANT

## 2022-02-20 PROCEDURE — 84100 ASSAY OF PHOSPHORUS: CPT | Performed by: INTERNAL MEDICINE

## 2022-02-20 PROCEDURE — 25000003 PHARM REV CODE 250: Performed by: INTERNAL MEDICINE

## 2022-02-20 PROCEDURE — 63600175 PHARM REV CODE 636 W HCPCS: Performed by: PHYSICIAN ASSISTANT

## 2022-02-20 PROCEDURE — A4216 STERILE WATER/SALINE, 10 ML: HCPCS | Performed by: PHYSICIAN ASSISTANT

## 2022-02-20 PROCEDURE — 25000003 PHARM REV CODE 250: Performed by: PHYSICIAN ASSISTANT

## 2022-02-20 PROCEDURE — 99233 SBSQ HOSP IP/OBS HIGH 50: CPT | Mod: ,,, | Performed by: INTERNAL MEDICINE

## 2022-02-20 PROCEDURE — 20000000 HC ICU ROOM

## 2022-02-20 PROCEDURE — 80053 COMPREHEN METABOLIC PANEL: CPT | Performed by: INTERNAL MEDICINE

## 2022-02-20 PROCEDURE — 63600175 PHARM REV CODE 636 W HCPCS: Performed by: INTERNAL MEDICINE

## 2022-02-20 PROCEDURE — 27000207 HC ISOLATION

## 2022-02-20 PROCEDURE — 99233 PR SUBSEQUENT HOSPITAL CARE,LEVL III: ICD-10-PCS | Mod: ,,, | Performed by: INTERNAL MEDICINE

## 2022-02-20 PROCEDURE — 94761 N-INVAS EAR/PLS OXIMETRY MLT: CPT

## 2022-02-20 PROCEDURE — 83735 ASSAY OF MAGNESIUM: CPT | Performed by: INTERNAL MEDICINE

## 2022-02-20 RX ORDER — POTASSIUM CHLORIDE 14.9 MG/ML
20 INJECTION INTRAVENOUS ONCE
Status: COMPLETED | OUTPATIENT
Start: 2022-02-20 | End: 2022-02-20

## 2022-02-20 RX ADMIN — TOPIRAMATE 200 MG: 100 TABLET, FILM COATED ORAL at 10:02

## 2022-02-20 RX ADMIN — DIAZEPAM 10 MG: 5 TABLET ORAL at 08:02

## 2022-02-20 RX ADMIN — CLONAZEPAM 0.5 MG: 0.5 TABLET ORAL at 08:02

## 2022-02-20 RX ADMIN — DIAZEPAM 10 MG: 5 TABLET ORAL at 03:02

## 2022-02-20 RX ADMIN — ENOXAPARIN SODIUM 40 MG: 100 INJECTION SUBCUTANEOUS at 04:02

## 2022-02-20 RX ADMIN — Medication 10 ML: at 03:02

## 2022-02-20 RX ADMIN — SODIUM CHLORIDE: 0.45 INJECTION, SOLUTION INTRAVENOUS at 02:02

## 2022-02-20 RX ADMIN — PHENOBARBITAL 194.4 MG: 32.4 TABLET ORAL at 08:02

## 2022-02-20 RX ADMIN — Medication 10 ML: at 05:02

## 2022-02-20 RX ADMIN — SODIUM CHLORIDE 100 ML/HR: 0.45 INJECTION, SOLUTION INTRAVENOUS at 02:02

## 2022-02-20 RX ADMIN — AMPICILLIN SODIUM AND SULBACTAM SODIUM 3 G: 2; 1 INJECTION, POWDER, FOR SOLUTION INTRAMUSCULAR; INTRAVENOUS at 12:02

## 2022-02-20 RX ADMIN — LAMOTRIGINE 200 MG: 100 TABLET ORAL at 10:02

## 2022-02-20 RX ADMIN — DIAZEPAM 10 MG: 5 TABLET ORAL at 10:02

## 2022-02-20 RX ADMIN — ACETAMINOPHEN 650 MG: 325 TABLET, FILM COATED ORAL at 08:02

## 2022-02-20 RX ADMIN — AMPICILLIN SODIUM AND SULBACTAM SODIUM 3 G: 2; 1 INJECTION, POWDER, FOR SOLUTION INTRAMUSCULAR; INTRAVENOUS at 06:02

## 2022-02-20 RX ADMIN — Medication 10 ML: at 10:02

## 2022-02-20 RX ADMIN — TOPIRAMATE 200 MG: 100 TABLET, FILM COATED ORAL at 08:02

## 2022-02-20 RX ADMIN — LAMOTRIGINE 200 MG: 100 TABLET ORAL at 08:02

## 2022-02-20 RX ADMIN — POTASSIUM CHLORIDE 20 MEQ: 200 INJECTION, SOLUTION INTRAVENOUS at 09:02

## 2022-02-20 RX ADMIN — AMPICILLIN SODIUM AND SULBACTAM SODIUM 3 G: 2; 1 INJECTION, POWDER, FOR SOLUTION INTRAMUSCULAR; INTRAVENOUS at 05:02

## 2022-02-20 NOTE — ASSESSMENT & PLAN NOTE
- Mr. Lamont Villarreal presents with vomiting episode followed by rattling in chest  and cough,  Fever,also reported  hiccups  - concern for aspiration PNA, has had it in past  - CXR without opacities or effusions, but overall history concerning for aspiration and procalcitonin is elevated   - s/p vanc/zosyn & cefepime in ED   - continue treatment with unasyn   - sputum cultures not collected, no cough currently  - Blood cx negative  - CURB-65 score: 2   - aspiration precautions   - soft mechanical diet at home  - speech recs   -kub ok  - will check right upper quadrant us with h/o gallstones in past, difficult to elicit due to his development delay

## 2022-02-20 NOTE — PT/OT/SLP PROGRESS
Speech Language Pathology      Lamont Villarreal  MRN: [id number]    Patient not seen today secondary to Other (Comment). Grandmother at bedside feeding pt upon SLP entrance into room. Pt averting head, indicating he does not want any more food. SLP unable to assess swallowing this date. SLP left message for pt's grandmother and RN to ask if pt has had previous Modified Barium Swallow Study. Pt's grandmother reports pt has been eating well today. Will follow-up 2/21/22.

## 2022-02-20 NOTE — ASSESSMENT & PLAN NOTE
- patient's mother states he has been having watery, non-bloody diarrhea x 2 months since starting the new medication  - stool studies results pending  - had 1 loose bm yesterday

## 2022-02-20 NOTE — PROGRESS NOTES
[facility] Intensive Care [facility] Medicine  Progress Note    Patient Name: Lamont Villarreal  MRN: [id number]  Patient Class: IP- Inpatient   Admission Date: 2/18/2022  Length of Stay: 2 days  Attending Physician: Mari Ricci MD  Primary Care Provider: Hollis Thomas MD        Subjective:     Principal Problem:Aspiration pneumonia        HPI:  Mr. Lamont Villarreal is a 26 y.o. male, with PMH of epilepsy, Lennox-Gestault syndrome (needs near total assistance w/ ADLs, has chronic contractures), who has frequent hiccups occasionally with vomiting, started w/ poor appetite and worsening hiccups and vomiting yesterday. Associated symptoms included listlessness, and decreased appetite.This morning, when he awoke, his mother noted he had a fever, and brought him to the ED, where temp was 101.8F. In the ED, he was also noted to have hypotension, with SBP in 80s and O2 sats of 94% on RA. ED labs were without leukocytosis of left shift, but procalcitonin was elevated with concerns for aspiration PNA despite normal appearing CXR. He was treated with vanc/zosyn, and clindamycin. A central line was placed, and he was started on maintenance fluids and has since been more alert. He was transferred for ICU admission to [facility]. He is admitted to inpatient status.       Overview/Hospital Course:  Patients blood pressure better, weaning off  levo.      Interval History: looks comfortable, on room air, vitals stable, ate some, no further vomiting.    Review of Systems   Unable to perform ROS: Patient nonverbal   Objective:     Vital Signs (Most Recent):  Temp: 97.5 °F (36.4 °C) (02/20/22 1115)  Pulse: 75 (02/20/22 1200)  Resp: 17 (02/20/22 1200)  BP: 98/62 (02/20/22 1200)  SpO2: 98 % (02/20/22 1200)   Vital Signs (24h Range):  Temp:  [97.5 °F (36.4 °C)-98.8 °F (37.1 °C)] 97.5 °F (36.4 °C)  Pulse:  [63-94] 75  Resp:  [12-23] 17  SpO2:  [96 %-100 %] 98 %  BP: ()/(52-73) 98/62     Weight: 43.9 kg (96 lb 12.5 oz)  Body mass  index is 18.9 kg/m².    Intake/Output Summary (Last 24 hours) at 2/20/2022 1522  Last data filed at 2/20/2022 1112  Gross per 24 hour   Intake 2906.75 ml   Output 1325 ml   Net 1581.75 ml        Physical Exam  Vitals reviewed.   Constitutional:       General: He is not in acute distress.     Appearance: He is well-developed.   HENT:      Head: Atraumatic.   Eyes:      Extraocular Movements: Extraocular movements intact.      Pupils: Pupils are equal, round, and reactive to light.   Cardiovascular:      Rate and Rhythm: Normal rate and regular rhythm.   Pulmonary:      Effort: Pulmonary effort is normal. No respiratory distress.      Breath sounds: Normal breath sounds.   Abdominal:      General: There is no distension.      Palpations: Abdomen is soft.   Musculoskeletal:         General: No swelling. Normal range of motion.      Cervical back: Normal range of motion and neck supple.      Comments: Tends to keep extremities contracted when in bed   Skin:     General: Skin is warm.      Findings: No rash.   Neurological:      General: No focal deficit present.      Mental Status: He is alert.      Comments: Non verbal    Psychiatric:         Behavior: Behavior normal.       Significant Labs: All pertinent labs within the past 24 hours have been reviewed.    Significant Imaging: I have reviewed all pertinent imaging results/findings within the past 24 hours.      Assessment/Plan:      * Aspiration pneumonia  - Mr. Lamont Villarreal presents with vomiting episode followed by rattling in chest  and cough,  Fever,also reported  hiccups  - concern for aspiration PNA, has had it in past  - CXR without opacities or effusions, but overall history concerning for aspiration and procalcitonin is elevated   - s/p vanc/zosyn & cefepime in ED   - continue treatment with unasyn   - sputum cultures not collected, no cough currently  - Blood cx negative  - CURB-65 score: 2   - aspiration precautions   - soft mechanical diet at home  -  speech recs   -kub ok  - will check right upper quadrant us with h/o gallstones in past, difficult to elicit due to his development delay    Septic shock  Started on levophed, weaned off, bp better  tx to floor      Debility  Is ambulatory at baseline  Therapy consulted      Diarrhea  - patient's mother states he has been having watery, non-bloody diarrhea x 2 months since starting the new medication  - stool studies results pending  - had 1 loose bm yesterday      Sepsis  This patient does have evidence of infective focus  My overall impression is sepsis. Vital signs were reviewed and noted in progress note.  Antibiotics given-   Antibiotics (From admission, onward)            Start     Stop Route Frequency Ordered    02/19/22 0045  ampicillin-sulbactam 3 g in sodium chloride 0.9 % 100 mL IVPB (ready to mix system)         -- IV Every 6 hours (non-standard times) 02/18/22 2340        Cultures were taken-   Microbiology Results (last 7 days)     Procedure Component Value Units Date/Time    E. coli 0157 antigen [[id number]] Collected: 02/19/22 1603    Order Status: No result Specimen: Stool Updated: 02/19/22 1604    Clostridium difficile EIA [[id number]] Collected: 02/19/22 1603    Order Status: Sent Specimen: Stool Updated: 02/19/22 1603    Stool culture [[id number]] Collected: 02/19/22 1603    Order Status: Sent Specimen: Stool Updated: 02/19/22 1603    Culture, Respiratory with Gram Stain [[id number]]     Order Status: No result Specimen: Respiratory from Sputum         Latest lactate reviewed, they are-  Recent Labs   Lab 02/18/22  1154 02/18/22  2345   LACTATE 0.9 1.0       Organ dysfunction indicated by Encephalopathy  and Acute respiratory failure  Source- pulmonary     Source control Achieved by- antibiotics   Wbc improved, bp better      Developmental delay  Noted, non verbal       Epilepsy  - continue home meds: cannabidiol 6 mL BID, lamotrigine 200 mg BID, pheobarbital 64.8 mg x 3 tabs at bedtime, topiramate  200 mg BID   - per pharmacy cannabidiol can not be labeled for use at this time due to legal restrictions   - seizure precautions   - last seizure was about 1 month ago, tends to have about 1 seizure/month  - no seizure reported , usually has [place] seizure  - also phenobarbital level has been high in past and mother knows about it, no changes were recommended in past      VTE Risk Mitigation (From admission, onward)         Ordered     enoxaparin injection 40 mg  Daily         02/19/22 0257     IP VTE HIGH RISK PATIENT  Once         02/19/22 0257     Place sequential compression device  Until discontinued         02/18/22 5900                Discharge Planning   MOHAN:      Code Status: Full Code   Is the patient medically ready for discharge?:     Reason for patient still in hospital (select all that apply): Patient trending condition and Treatment  Discharge Plan A: Home with family                  Mari Ricci MD  Department of Hospital Medicine   [facility] ([place])

## 2022-02-20 NOTE — SUBJECTIVE & OBJECTIVE
Interval History: looks comfortable, on room air, vitals stable, ate some, no further vomiting.    Review of Systems   Unable to perform ROS: Patient nonverbal   Objective:     Vital Signs (Most Recent):  Temp: 97.5 °F (36.4 °C) (02/20/22 1115)  Pulse: 75 (02/20/22 1200)  Resp: 17 (02/20/22 1200)  BP: 98/62 (02/20/22 1200)  SpO2: 98 % (02/20/22 1200)   Vital Signs (24h Range):  Temp:  [97.5 °F (36.4 °C)-98.8 °F (37.1 °C)] 97.5 °F (36.4 °C)  Pulse:  [63-94] 75  Resp:  [12-23] 17  SpO2:  [96 %-100 %] 98 %  BP: ()/(52-73) 98/62     Weight: 43.9 kg (96 lb 12.5 oz)  Body mass index is 18.9 kg/m².    Intake/Output Summary (Last 24 hours) at 2/20/2022 1522  Last data filed at 2/20/2022 1112  Gross per 24 hour   Intake 2906.75 ml   Output 1325 ml   Net 1581.75 ml        Physical Exam  Vitals reviewed.   Constitutional:       General: He is not in acute distress.     Appearance: He is well-developed.   HENT:      Head: Atraumatic.   Eyes:      Extraocular Movements: Extraocular movements intact.      Pupils: Pupils are equal, round, and reactive to light.   Cardiovascular:      Rate and Rhythm: Normal rate and regular rhythm.   Pulmonary:      Effort: Pulmonary effort is normal. No respiratory distress.      Breath sounds: Normal breath sounds.   Abdominal:      General: There is no distension.      Palpations: Abdomen is soft.   Musculoskeletal:         General: No swelling. Normal range of motion.      Cervical back: Normal range of motion and neck supple.      Comments: Tends to keep extremities contracted when in bed   Skin:     General: Skin is warm.      Findings: No rash.   Neurological:      General: No focal deficit present.      Mental Status: He is alert.      Comments: Non verbal    Psychiatric:         Behavior: Behavior normal.       Significant Labs: All pertinent labs within the past 24 hours have been reviewed.    Significant Imaging: I have reviewed all pertinent imaging results/findings within the  past 24 hours.

## 2022-02-20 NOTE — PLAN OF CARE
Pt is Alert, 100% on room air, Levo is turned off pt maintaining MAP of greater than 65.   placed on Contact precautions due to three lose stools notes through out shift. Pt was seen by speech today and swallow eval will be perform due to hx of aspiration and risk of silent aspiration. Great aunt is at bed side.

## 2022-02-20 NOTE — ASSESSMENT & PLAN NOTE
This patient does have evidence of infective focus  My overall impression is sepsis. Vital signs were reviewed and noted in progress note.  Antibiotics given-   Antibiotics (From admission, onward)            Start     Stop Route Frequency Ordered    02/19/22 0045  ampicillin-sulbactam 3 g in sodium chloride 0.9 % 100 mL IVPB (ready to mix system)         -- IV Every 6 hours (non-standard times) 02/18/22 2340        Cultures were taken-   Microbiology Results (last 7 days)     Procedure Component Value Units Date/Time    E. coli 0157 antigen [[id number]] Collected: 02/19/22 1603    Order Status: No result Specimen: Stool Updated: 02/19/22 1604    Clostridium difficile EIA [[id number]] Collected: 02/19/22 1603    Order Status: Sent Specimen: Stool Updated: 02/19/22 1603    Stool culture [[id number]] Collected: 02/19/22 1603    Order Status: Sent Specimen: Stool Updated: 02/19/22 1603    Culture, Respiratory with Gram Stain [[id number]]     Order Status: No result Specimen: Respiratory from Sputum         Latest lactate reviewed, they are-  Recent Labs   Lab 02/18/22  1154 02/18/22  2345   LACTATE 0.9 1.0       Organ dysfunction indicated by Encephalopathy  and Acute respiratory failure  Source- pulmonary     Source control Achieved by- antibiotics   Wbc improved, bp better

## 2022-02-21 VITALS
RESPIRATION RATE: 15 BRPM | BODY MASS INDEX: 19.01 KG/M2 | HEIGHT: 60 IN | SYSTOLIC BLOOD PRESSURE: 98 MMHG | HEART RATE: 62 BPM | DIASTOLIC BLOOD PRESSURE: 63 MMHG | OXYGEN SATURATION: 99 % | WEIGHT: 96.81 LBS | TEMPERATURE: 98 F

## 2022-02-21 LAB
ALBUMIN SERPL BCP-MCNC: 3.3 G/DL (ref 3.5–5.2)
ALP SERPL-CCNC: 76 U/L (ref 55–135)
ALT SERPL W/O P-5'-P-CCNC: 19 U/L (ref 10–44)
ANION GAP SERPL CALC-SCNC: 6 MMOL/L (ref 8–16)
AST SERPL-CCNC: 18 U/L (ref 10–40)
BASOPHILS # BLD AUTO: 0.03 K/UL (ref 0–0.2)
BASOPHILS NFR BLD: 0.4 % (ref 0–1.9)
BILIRUB SERPL-MCNC: 0.2 MG/DL (ref 0.1–1)
BUN SERPL-MCNC: 6 MG/DL (ref 6–20)
CALCIUM SERPL-MCNC: 8 MG/DL (ref 8.7–10.5)
CHLORIDE SERPL-SCNC: 111 MMOL/L (ref 95–110)
CO2 SERPL-SCNC: 21 MMOL/L (ref 23–29)
CREAT SERPL-MCNC: 0.9 MG/DL (ref 0.5–1.4)
DIFFERENTIAL METHOD: ABNORMAL
EOSINOPHIL # BLD AUTO: 0.9 K/UL (ref 0–0.5)
EOSINOPHIL NFR BLD: 11.7 % (ref 0–8)
ERYTHROCYTE [DISTWIDTH] IN BLOOD BY AUTOMATED COUNT: 13.8 % (ref 11.5–14.5)
EST. GFR  (AFRICAN AMERICAN): >60 ML/MIN/1.73 M^2
EST. GFR  (NON AFRICAN AMERICAN): >60 ML/MIN/1.73 M^2
GLUCOSE SERPL-MCNC: 92 MG/DL (ref 70–110)
HCT VFR BLD AUTO: 35.6 % (ref 40–54)
HGB BLD-MCNC: 11.9 G/DL (ref 14–18)
IMM GRANULOCYTES # BLD AUTO: 0.01 K/UL (ref 0–0.04)
IMM GRANULOCYTES NFR BLD AUTO: 0.1 % (ref 0–0.5)
LAMOTRIGINE SERPL-MCNC: 6 UG/ML (ref 2–15)
LYMPHOCYTES # BLD AUTO: 2.4 K/UL (ref 1–4.8)
LYMPHOCYTES NFR BLD: 32.7 % (ref 18–48)
MCH RBC QN AUTO: 32 PG (ref 27–31)
MCHC RBC AUTO-ENTMCNC: 33.4 G/DL (ref 32–36)
MCV RBC AUTO: 96 FL (ref 82–98)
MONOCYTES # BLD AUTO: 0.5 K/UL (ref 0.3–1)
MONOCYTES NFR BLD: 6.6 % (ref 4–15)
NEUTROPHILS # BLD AUTO: 3.6 K/UL (ref 1.8–7.7)
NEUTROPHILS NFR BLD: 48.5 % (ref 38–73)
NRBC BLD-RTO: 0 /100 WBC
PLATELET # BLD AUTO: 117 K/UL (ref 150–450)
PMV BLD AUTO: 9.9 FL (ref 9.2–12.9)
POTASSIUM SERPL-SCNC: 3.3 MMOL/L (ref 3.5–5.1)
PROT SERPL-MCNC: 5.8 G/DL (ref 6–8.4)
RBC # BLD AUTO: 3.72 M/UL (ref 4.6–6.2)
SODIUM SERPL-SCNC: 138 MMOL/L (ref 136–145)
WBC # BLD AUTO: 7.37 K/UL (ref 3.9–12.7)

## 2022-02-21 PROCEDURE — 63600175 PHARM REV CODE 636 W HCPCS: Performed by: PHYSICIAN ASSISTANT

## 2022-02-21 PROCEDURE — 25000003 PHARM REV CODE 250: Performed by: INTERNAL MEDICINE

## 2022-02-21 PROCEDURE — A4216 STERILE WATER/SALINE, 10 ML: HCPCS | Performed by: PHYSICIAN ASSISTANT

## 2022-02-21 PROCEDURE — 25000003 PHARM REV CODE 250: Performed by: PHYSICIAN ASSISTANT

## 2022-02-21 PROCEDURE — 63600175 PHARM REV CODE 636 W HCPCS: Performed by: INTERNAL MEDICINE

## 2022-02-21 PROCEDURE — 99239 HOSP IP/OBS DSCHRG MGMT >30: CPT | Mod: ,,, | Performed by: INTERNAL MEDICINE

## 2022-02-21 PROCEDURE — 99239 PR HOSPITAL DISCHARGE DAY,>30 MIN: ICD-10-PCS | Mod: ,,, | Performed by: INTERNAL MEDICINE

## 2022-02-21 PROCEDURE — 92526 ORAL FUNCTION THERAPY: CPT

## 2022-02-21 PROCEDURE — 80053 COMPREHEN METABOLIC PANEL: CPT | Performed by: INTERNAL MEDICINE

## 2022-02-21 PROCEDURE — 85025 COMPLETE CBC W/AUTO DIFF WBC: CPT | Performed by: PHYSICIAN ASSISTANT

## 2022-02-21 RX ORDER — CHLORPROMAZINE HYDROCHLORIDE 25 MG/1
25 TABLET, FILM COATED ORAL 2 TIMES DAILY PRN
Qty: 15 TABLET | Refills: 0 | Status: SHIPPED | OUTPATIENT
Start: 2022-02-21 | End: 2022-02-21 | Stop reason: SDUPTHER

## 2022-02-21 RX ORDER — PANTOPRAZOLE SODIUM 40 MG/1
40 TABLET, DELAYED RELEASE ORAL DAILY
Qty: 30 TABLET | Refills: 1 | Status: SHIPPED | OUTPATIENT
Start: 2022-02-21 | End: 2022-04-11

## 2022-02-21 RX ORDER — POTASSIUM CHLORIDE 14.9 MG/ML
20 INJECTION INTRAVENOUS
Status: COMPLETED | OUTPATIENT
Start: 2022-02-21 | End: 2022-02-21

## 2022-02-21 RX ORDER — FAMOTIDINE 10 MG/ML
20 INJECTION INTRAVENOUS ONCE
Status: COMPLETED | OUTPATIENT
Start: 2022-02-21 | End: 2022-02-21

## 2022-02-21 RX ORDER — AMOXICILLIN AND CLAVULANATE POTASSIUM 875; 125 MG/1; MG/1
1 TABLET, FILM COATED ORAL EVERY 12 HOURS
Qty: 8 TABLET | Refills: 0 | Status: SHIPPED | OUTPATIENT
Start: 2022-02-21 | End: 2022-02-25

## 2022-02-21 RX ORDER — CHLORPROMAZINE HYDROCHLORIDE 25 MG/1
25 TABLET, FILM COATED ORAL 2 TIMES DAILY PRN
Qty: 15 TABLET | Refills: 0 | Status: SHIPPED | OUTPATIENT
Start: 2022-02-21 | End: 2022-04-11

## 2022-02-21 RX ORDER — POTASSIUM CHLORIDE 14.9 MG/ML
20 INJECTION INTRAVENOUS ONCE
Status: DISCONTINUED | OUTPATIENT
Start: 2022-02-21 | End: 2022-02-21

## 2022-02-21 RX ORDER — CHLORPROMAZINE HYDROCHLORIDE 25 MG/1
25 TABLET, FILM COATED ORAL ONCE
Status: DISCONTINUED | OUTPATIENT
Start: 2022-02-21 | End: 2022-02-21

## 2022-02-21 RX ADMIN — TOPIRAMATE 200 MG: 100 TABLET, FILM COATED ORAL at 09:02

## 2022-02-21 RX ADMIN — Medication 10 ML: at 06:02

## 2022-02-21 RX ADMIN — DIAZEPAM 10 MG: 5 TABLET ORAL at 09:02

## 2022-02-21 RX ADMIN — LAMOTRIGINE 200 MG: 100 TABLET ORAL at 09:02

## 2022-02-21 RX ADMIN — SODIUM CHLORIDE: 0.45 INJECTION, SOLUTION INTRAVENOUS at 12:02

## 2022-02-21 RX ADMIN — POTASSIUM CHLORIDE 20 MEQ: 200 INJECTION, SOLUTION INTRAVENOUS at 09:02

## 2022-02-21 RX ADMIN — POTASSIUM CHLORIDE 20 MEQ: 200 INJECTION, SOLUTION INTRAVENOUS at 11:02

## 2022-02-21 RX ADMIN — FAMOTIDINE 20 MG: 10 INJECTION, SOLUTION INTRAVENOUS at 10:02

## 2022-02-21 RX ADMIN — AMPICILLIN SODIUM AND SULBACTAM SODIUM 3 G: 2; 1 INJECTION, POWDER, FOR SOLUTION INTRAMUSCULAR; INTRAVENOUS at 12:02

## 2022-02-21 RX ADMIN — AMPICILLIN SODIUM AND SULBACTAM SODIUM 3 G: 2; 1 INJECTION, POWDER, FOR SOLUTION INTRAMUSCULAR; INTRAVENOUS at 06:02

## 2022-02-21 NOTE — NURSING
Discharge instructions and medications reviewed with patient mother, patient mother verbalized understanding. Patient discharged. [facility] to transport patient home. Assisted patient in van with patient mother.

## 2022-02-21 NOTE — PT/OT/SLP PROGRESS
Speech Language Pathology Treatment    Patient Name:  Lamont Villarreal   MRN:  [id number]  Admitting Diagnosis: Aspiration pneumonia    Recommendations:                  General Recommendations:    1. Speech pathology to follow-up for ongoing assessment of oral and pharyngeal swallow function.   2. Pt's mother requesting to defer instrumental evaluation at this time. Recommend consideration of OP MBSS given high risk for silent aspiration.      Diet recommendations: Solids:  Mechanical soft, Finely chopped meat, Liquids: Thin      Aspiration Precautions:   1. Sit upright as much as possible  2. Slow rate of drinking  3. Minimize size of bite/sip  4. Monitor for s/s of aspiration or airway threat  5. Oral care as pt tolerates     General Precautions: Standard, aspiration     Communication strategies:  Pt is non-verbal, relies on caregiver to anticipate wants/needs    Subjective     · Pt asleep, mother at bedside.   · RN reports pt tolerating meals and meds without difficulty.     Respiratory Status: Room air    Objective:     Has the patient been evaluated by SLP for swallowing?   Yes  Keep patient NPO? No   Current Respiratory Status:    Room air    Swallowing:  Pt's mother at bedside. Mother provided ongoing hx. Reports pt's past aspiration events occurred during seizure-activity. However, over [place] pt did possibly aspiration while eating turkey, denied any choking. Pt was able to cough to expel piece of chicken. Pt then had hiccups for 1 week. Mother reports similar response this past week, uncontrollable hiccups, therefore, suspected possible aspiration. Mother did not witness an aspiration event. SLP provided education on dysphagia and aspiration. Reviewed risks of silent aspiration. SLP educated pt's mother on recommendation for instrumental evaluation of MBSS. All questions answered. Pt's mother requesting to defer MBS at this time, will consider to do study as OP should the need arise.     Dr. Ricci at  bedside. Ordering PPI to reduce hiccups. Possible d/c today.     Assessment:     Lamont Villarreal is a 26 y.o. male with an SLP diagnosis of Dysphagia.    Goals:   Multidisciplinary Problems     SLP Goals        Problem: SLP Goal    Goal Priority Disciplines Outcome   SLP Goal     SLP Ongoing, Progressing   Description: 1. Pt will consume mechanical soft solids with thin liquids without overt s/s of aspiration or airway threat given total caregiver assist.                   Plan:     · Patient to be seen:  2 x/week, 3 x/week   · Plan of Care expires:     · Plan of Care reviewed with:  mother   · SLP Follow-Up:  Yes       Discharge recommendations:  other (see comments) (TBD)     Time Tracking:     SLP Treatment Date:   02/21/22  Speech Start Time:  0855  Speech Stop Time:  0905     Speech Total Time (min):  10 min    Billable Minutes: Treatment Swallowing Dysfunction 10 min    02/21/2022

## 2022-02-23 NOTE — ASSESSMENT & PLAN NOTE
- Mr. Lamont Villarreal presents with vomiting episode followed by rattling in chest  and cough,  Fever,also reported  hiccups  - concern for aspiration PNA, has had it in past  - CXR without opacities or effusions, but overall history concerning for aspiration and procalcitonin is elevated   - s/p vanc/zosyn & cefepime in ED   - continue treatment with unasyn   - sputum cultures not collected, no cough currently  - Blood cx negative  - CURB-65 score: 2   - aspiration precautions   - soft mechanical diet at home  - speech recs   -kub ok

## 2022-02-23 NOTE — DISCHARGE SUMMARY
[facility] - Intensive Care [facility] Medicine  Discharge Summary      Patient Name: Lamont Villarreal  MRN: [id number]  Patient Class: IP- Inpatient  Admission Date: 2/18/2022  Hospital Length of Stay: 3 days  Discharge Date and Time: 2/21/2022  4:17 PM  Attending Physician: No att. providers found   Discharging Provider: Mari Ricci MD  Primary Care Provider: Hollis Thomas MD      HPI:   Mr. Lamont Villarreal is a 26 y.o. male, with PMH of epilepsy, Lennox-Gestault syndrome (needs near total assistance w/ ADLs, has chronic contractures), who has frequent hiccups occasionally with vomiting, started w/ poor appetite and worsening hiccups and vomiting yesterday. Associated symptoms included listlessness, and decreased appetite.This morning, when he awoke, his mother noted he had a fever, and brought him to the ED, where temp was 101.8F. In the ED, he was also noted to have hypotension, with SBP in 80s and O2 sats of 94% on RA. ED labs were without leukocytosis of left shift, but procalcitonin was elevated with concerns for aspiration PNA despite normal appearing CXR. He was treated with vanc/zosyn, and clindamycin. A central line was placed, and he was started on maintenance fluids and has since been more alert. He was transferred for ICU admission to [facility]. He is admitted to inpatient status.       * No surgery found *      Hospital Course:   Patients blood pressure better, weaned off  levo.No further vomiting, had loose stool once and then soft stools thereafter.Tolerating diet.Us showed gallstones but no wall thickening.He was continued on antibiotics for aspiration pneumonia.Started on protonix for hiccups and given prescription for thorazine for as needed hiccups.       Goals of Care Treatment Preferences:  Code Status: Full Code      Consults:     * Aspiration pneumonia  - Mr. Lamont Villarreal presents with vomiting episode followed by rattling in chest  and cough,  Fever,also reported  hiccups  - concern for  aspiration PNA, has had it in past  - CXR without opacities or effusions, but overall history concerning for aspiration and procalcitonin is elevated   - s/p vanc/zosyn & cefepime in ED   - continue treatment with unasyn   - sputum cultures not collected, no cough currently  - Blood cx negative  - CURB-65 score: 2   - aspiration precautions   - soft mechanical diet at home  - speech recs   -kub ok      Septic shock  Started on levophed, weaned off, bp better  tx to floor      Epilepsy  - continue home meds: cannabidiol 6 mL BID, lamotrigine 200 mg BID, pheobarbital 64.8 mg x 3 tabs at bedtime, topiramate 200 mg BID   - per pharmacy cannabidiol can not be labeled for use at this time due to legal restrictions   - seizure precautions   - last seizure was about 1 month ago, tends to have about 1 seizure/month  - no seizure reported , usually has [place] seizure  - also phenobarbital level has been high in past and mother knows about it, no changes were recommended in past      Final Active Diagnoses:    Diagnosis Date Noted POA    PRINCIPAL PROBLEM:  Aspiration pneumonia [J69.0] 03/02/2017 Yes    Diarrhea [R19.7] 02/19/2022 Yes    Debility [R53.81] 02/19/2022 Yes    Septic shock [A41.9, R65.21] 02/19/2022 Yes    Sepsis [A41.9] 02/28/2017 Yes    Developmental delay [R62.50] 07/21/2014 Yes    Epilepsy [G40.909] 09/26/2012 Yes      Problems Resolved During this Admission:       Discharged Condition: stable    Disposition: Home or Self Care    Follow Up:   Follow-up Information     Hollis Thomas MD Follow up in 1 week(s).    Specialty: Family Medicine  Contact information:  [address] [facility]  [place] [address]  [phone]                       Patient Instructions:      Diet Dysphagia Mechanical Soft     Activity as tolerated       Significant Diagnostic Studies:   Lab Results   Component Value Date    WBC 7.37 02/21/2022    HGB 11.9 (L) 02/21/2022    HCT 35.6 (L) 02/21/2022    MCV  96 02/21/2022     (L) 02/21/2022       BMP  Lab Results   Component Value Date     02/21/2022    K 3.3 (L) 02/21/2022     (H) 02/21/2022    CO2 21 (L) 02/21/2022    BUN 6 02/21/2022    CREATININE 0.9 02/21/2022    CALCIUM 8.0 (L) 02/21/2022    ANIONGAP 6 (L) 02/21/2022    ESTGFRAFRICA >60 02/21/2022    EGFRNONAA >60 02/21/2022     US Abdomen Limited  Narrative: EXAMINATION:  US ABDOMEN LIMITED    CLINICAL HISTORY:  right upper quadrant, nausea, vomiting, h/o gallstones;    TECHNIQUE:  Limited ultrasound of the right upper quadrant of the abdomen (including pancreas, liver, gallbladder, common bile duct, and spleen) was performed.    COMPARISON:  03/01/2017    FINDINGS:  The pancreas is obscured by overlying soft tissue structures.  There is cholelithiasis, shadowing from the same limits evaluation of the posterior gallbladder wall.  No gallbladder wall thickening.  No gallbladder wall hyperemia.  No pericholecystic fluid.  The common duct is not dilated measuring 0.3 cm.  Sonographic Ferguson sign is reportedly negative.  The IVC is grossly unremarkable.  The hepatic parenchyma is heterogeneous, likely related to technique however correlation with LFTs recommended.  The visualized portions of the right kidney are unremarkable.  No ascites.  The spleen is not enlarged.  Impression: Cholelithiasis, no secondary findings to suggest acute cholecystitis.    Coarse hepatic echotexture, likely related to technique however correlation with LFTs is advised to exclude infiltrative process.    Electronically signed by: Ehsan Roca MD  Date:    02/20/2022  Time:    18:12        Pending Diagnostic Studies:     None         Medications:  Reconciled Home Medications:      Medication List      START taking these medications    amoxicillin-clavulanate 875-125mg 875-125 mg per tablet  Commonly known as: AUGMENTIN  Take 1 tablet by mouth every 12 (twelve) hours. for 4 days     chlorproMAZINE 25 MG tablet  Commonly  known as: THORAZINE  Take 1 tablet (25 mg total) by mouth 2 (two) times daily as needed (hiccups).     pantoprazole 40 MG tablet  Commonly known as: PROTONIX  Take 1 tablet (40 mg total) by mouth once daily.        CONTINUE taking these medications    clonazePAM 0.5 MG tablet  Commonly known as: KlonoPIN  Take 1 tablet (0.5 mg total) by mouth every evening.     diazePAM 10 MG Tab  Commonly known as: VALIUM  One three times daily     EPIDIOLEX 100 mg/mL  Generic drug: cannabidioL  Take 4 ml by mouth twice a day for seizures.     lamoTRIgine 200 MG tablet  Commonly known as: LAMICTAL  Take 1 tablet (200 mg total) by mouth 2 (two) times daily. Take one tablet twice a day.     PHENobarbitaL 64.8 MG tablet  Commonly known as: LUMINAL  TAKE 3 TABLETS BY MOUTH ONCE DAILY AT BEDTIME. NEEDS APPOINTMENT     topiramate 200 MG Tab  Commonly known as: TOPAMAX  Take 1 tablet (200 mg total) by mouth 2 (two) times daily.            Indwelling Lines/Drains at time of discharge:   Lines/Drains/Airways     None                 Time spent on the discharge of patient: 35 minutes         Mari Ricci MD  Department of Hospital Medicine  [facility] ([place])

## 2022-02-24 LAB
BACTERIA BLD CULT: NORMAL
BACTERIA BLD CULT: NORMAL

## 2022-03-07 ENCOUNTER — SPECIALTY PHARMACY (OUTPATIENT)
Dept: PHARMACY | Facility: CLINIC | Age: 27
End: 2022-03-07
Payer: COMMERCIAL

## 2022-03-07 NOTE — TELEPHONE ENCOUNTER
Specialty Pharmacy - Refill Coordination    Specialty Medication Orders Linked to Encounter    Flowsheet Row Most Recent Value   Medication #1 cannabidioL (EPIDIOLEX) 100 mg/mL (Order#[id number], Rx#[id number])          Refill Questions - Documented Responses    Flowsheet Row Most Recent Value   Patient Availability and HIPAA Verification    Does patient want to proceed with activity? Yes   HIPAA/medical authority confirmed? Yes   Relationship to patient of person spoken to? Mother   Refill Screening Questions    Changes to allergies? No   Changes to medications? No   New conditions since last clinic visit? No   Unplanned office visit, urgent care, ED, or hospital admission in the last 4 weeks? No   How does patient/caregiver feel medication is working? Good   Financial problems or insurance changes? No   How many doses of your specialty medications were missed in the last 4 weeks? 0   Would patient like to speak to a pharmacist? No   When does the patient need to receive the medication? 03/14/22   Refill Delivery Questions    How will the patient receive the medication? Delivery Viri   When does the patient need to receive the medication? 03/14/22   Shipping Address Home   Address in [facility] confirmed and updated if neccessary? Yes   Expected Copay ($) 0   Is the patient able to afford the medication copay? Yes   Payment Method zero copay   Days supply of Refill 25   Supplies needed? No supplies needed   Refill activity completed? Yes   Refill activity plan Refill scheduled   Shipment/Pickup Date: 03/07/22          Current Outpatient Medications   Medication Sig    cannabidioL (EPIDIOLEX) 100 mg/mL Take 4 ml by mouth twice a day for seizures. (Patient taking differently: Take 4 ml by mouth twice a day for seizures.)    chlorproMAZINE (THORAZINE) 25 MG tablet Take 1 tablet (25 mg total) by mouth 2 (two) times daily as needed (hiccups).    clonazePAM (KLONOPIN) 0.5 MG tablet Take 1 tablet (0.5 mg total)  by mouth every evening.    diazePAM (VALIUM) 10 MG Tab One three times daily    lamoTRIgine (LAMICTAL) 200 MG tablet Take 1 tablet (200 mg total) by mouth 2 (two) times daily. Take one tablet twice a day.    pantoprazole (PROTONIX) 40 MG tablet Take 1 tablet (40 mg total) by mouth once daily.    PHENobarbitaL (LUMINAL) 64.8 MG tablet TAKE 3 TABLETS BY MOUTH ONCE DAILY AT BEDTIME. NEEDS APPOINTMENT    topiramate (TOPAMAX) 200 MG Tab Take 1 tablet (200 mg total) by mouth 2 (two) times daily.   Last reviewed on 2/18/2022 11:51 PM by Allison Chao RN    Review of patient's allergies indicates:  No Known Allergies Last reviewed on  2/19/2022 2:53 AM by Asha Zafar      Tasks added this encounter   4/1/2022 - Refill Call (Auto Added)   Tasks due within next 3 months   No tasks due.     Ary Mandujano, PharmD  [facility]  [address] [place] [id number]  Phone: [phone]  Fax: [phone]

## 2022-03-15 DIAGNOSIS — G40.201 PARTIAL SYMPTOMATIC EPILEPSY WITH COMPLEX PARTIAL SEIZURES, NOT INTRACTABLE, WITH STATUS EPILEPTICUS: ICD-10-CM

## 2022-03-15 DIAGNOSIS — G40.209 PARTIAL SYMPTOMATIC EPILEPSY WITH COMPLEX PARTIAL SEIZURES, NOT INTRACTABLE, WITHOUT STATUS EPILEPTICUS: ICD-10-CM

## 2022-03-15 DIAGNOSIS — G40.909 SEIZURE DISORDER: ICD-10-CM

## 2022-03-15 DIAGNOSIS — R56.9 CONVULSIONS, UNSPECIFIED CONVULSION TYPE: ICD-10-CM

## 2022-03-15 NOTE — TELEPHONE ENCOUNTER
----- Message from Cara Casillas MA sent at 3/15/2022  3:51 PM CDT -----  Contact: Nolberto / mother  Lamont Villarreal  MRN: [id number]  : 1995  PCP: Hollis Thomas  Home Phone      [phone]  Work Phone      Not on file.  Mobile          [phone]      MESSAGE: Needs refill on Phenobarbital, diazepam, clonazepam, lamoTRIgine and topiramate.      Phone:[phone]  Pharmacy:  [facility]

## 2022-03-16 RX ORDER — PHENOBARBITAL 64.8 MG/1
TABLET ORAL
Qty: 90 TABLET | Refills: 0 | Status: SHIPPED | OUTPATIENT
Start: 2022-03-16 | End: 2022-04-11 | Stop reason: SDUPTHER

## 2022-03-16 RX ORDER — DIAZEPAM 10 MG/1
TABLET ORAL
Qty: 90 TABLET | Refills: 0 | Status: SHIPPED | OUTPATIENT
Start: 2022-03-16 | End: 2022-04-11 | Stop reason: SDUPTHER

## 2022-03-16 RX ORDER — CLONAZEPAM 0.5 MG/1
0.5 TABLET ORAL NIGHTLY
Qty: 30 TABLET | Refills: 0 | Status: SHIPPED | OUTPATIENT
Start: 2022-03-16 | End: 2022-04-11 | Stop reason: SDUPTHER

## 2022-03-16 RX ORDER — LAMOTRIGINE 200 MG/1
200 TABLET ORAL 2 TIMES DAILY
Qty: 60 TABLET | Refills: 0 | Status: SHIPPED | OUTPATIENT
Start: 2022-03-16 | End: 2022-04-11 | Stop reason: SDUPTHER

## 2022-03-16 RX ORDER — TOPIRAMATE 200 MG/1
200 TABLET ORAL 2 TIMES DAILY
Qty: 60 TABLET | Refills: 0 | Status: SHIPPED | OUTPATIENT
Start: 2022-03-16 | End: 2022-04-11 | Stop reason: SDUPTHER

## 2022-04-04 ENCOUNTER — SPECIALTY PHARMACY (OUTPATIENT)
Dept: PHARMACY | Facility: CLINIC | Age: 27
End: 2022-04-04
Payer: COMMERCIAL

## 2022-04-04 DIAGNOSIS — G40.909 SEIZURE DISORDER: ICD-10-CM

## 2022-04-04 DIAGNOSIS — G40.814 LENNOX-GASTAUT SYNDROME, INTRACTABLE, WITHOUT STATUS EPILEPTICUS: ICD-10-CM

## 2022-04-05 RX ORDER — CANNABIDIOL 100 MG/ML
SOLUTION ORAL
Qty: 240 ML | Refills: 5 | Status: SHIPPED | OUTPATIENT
Start: 2022-04-05 | End: 2022-09-19 | Stop reason: SDUPTHER

## 2022-04-07 ENCOUNTER — TELEPHONE (OUTPATIENT)
Dept: NEUROLOGY | Facility: CLINIC | Age: 27
End: 2022-04-07
Payer: COMMERCIAL

## 2022-04-07 NOTE — TELEPHONE ENCOUNTER
Patient no showed the last 2 appointments scheduled. Appointment scheduled for 4/11/2022 at 3:00 pm to refill medication and follow up.

## 2022-04-07 NOTE — TELEPHONE ENCOUNTER
----- Message from Cara Casillas MA sent at 2022  2:11 PM CDT -----  Contact: Nolberto / mother  Lamont Villarreal  MRN: [id number]  : 1995  PCP: Hollis Thomas  Home Phone      [phone]  Work Phone      Not on file.  Mobile          [phone]      MESSAGE: Needs refill on Epidiolex,Luminal,Topamax,Lamictal,Klonopin and Valium.      Phone:  [phone]  Pharmacy:  [facility]

## 2022-04-08 NOTE — TELEPHONE ENCOUNTER
Specialty Pharmacy - Refill Coordination    Specialty Medication Orders Linked to Encounter    Flowsheet Row Most Recent Value   Medication #1 cannabidioL (EPIDIOLEX) 100 mg/mL (Order#[id number], Rx#[id number])          Refill Questions - Documented Responses    Flowsheet Row Most Recent Value   Patient Availability and HIPAA Verification    Does patient want to proceed with activity? Yes   HIPAA/medical authority confirmed? Yes   Relationship to patient of person spoken to? Mother   Refill Screening Questions    Changes to allergies? No   Changes to medications? No   New conditions since last clinic visit? No   Unplanned office visit, urgent care, ED, or hospital admission in the last 4 weeks? No   How does patient/caregiver feel medication is working? Good   Financial problems or insurance changes? No   How many doses of your specialty medications were missed in the last 4 weeks? 0   Would patient like to speak to a pharmacist? No   When does the patient need to receive the medication? 04/11/22   Refill Delivery Questions    How will the patient receive the medication? Delivery Viri   When does the patient need to receive the medication? 04/11/22   Shipping Address Home   Address in [facility] confirmed and updated if neccessary? Yes   Expected Copay ($) 0   Is the patient able to afford the medication copay? Yes   Payment Method zero copay   Days supply of Refill 25   Supplies needed? No supplies needed   Refill activity completed? Yes   Refill activity plan Refill scheduled   Shipment/Pickup Date: 04/11/22          Current Outpatient Medications   Medication Sig    cannabidioL (EPIDIOLEX) 100 mg/mL Take 4 ml by mouth twice a day for seizures.    chlorproMAZINE (THORAZINE) 25 MG tablet Take 1 tablet (25 mg total) by mouth 2 (two) times daily as needed (hiccups).    clonazePAM (KLONOPIN) 0.5 MG tablet Take 1 tablet (0.5 mg total) by mouth every evening.    diazePAM (VALIUM) 10 MG Tab One three times  daily    lamoTRIgine (LAMICTAL) 200 MG tablet Take 1 tablet (200 mg total) by mouth 2 (two) times daily. Take one tablet twice a day.    pantoprazole (PROTONIX) 40 MG tablet Take 1 tablet (40 mg total) by mouth once daily.    PHENobarbitaL (LUMINAL) 64.8 MG tablet TAKE 3 TABLETS BY MOUTH ONCE DAILY AT BEDTIME. NEEDS APPOINTMENT    topiramate (TOPAMAX) 200 MG Tab Take 1 tablet (200 mg total) by mouth 2 (two) times daily.   Last reviewed on 2/18/2022 11:51 PM by Allison Choa RN    Review of patient's allergies indicates:  No Known Allergies Last reviewed on  2/19/2022 2:53 AM by Asha Zafar      Tasks added this encounter   4/29/2022 - Refill Call (Auto Added)   Tasks due within next 3 months   No tasks due.     Ary Mandujano, PharmD  [facility]  [address] [place] [id number]  Phone: [phone]  Fax: [phone]

## 2022-04-11 ENCOUNTER — OFFICE VISIT (OUTPATIENT)
Dept: NEUROLOGY | Facility: CLINIC | Age: 27
End: 2022-04-11
Payer: COMMERCIAL

## 2022-04-11 DIAGNOSIS — G40.909 SEIZURE DISORDER: Primary | ICD-10-CM

## 2022-04-11 DIAGNOSIS — R06.6 INTRACTABLE HICCUPS: ICD-10-CM

## 2022-04-11 DIAGNOSIS — R62.50 DEVELOPMENTAL DELAY: ICD-10-CM

## 2022-04-11 PROCEDURE — 99213 PR OFFICE/OUTPT VISIT, EST, LEVL III, 20-29 MIN: ICD-10-PCS | Mod: 95,,, | Performed by: PHYSICIAN ASSISTANT

## 2022-04-11 PROCEDURE — 1159F PR MEDICATION LIST DOCUMENTED IN MEDICAL RECORD: ICD-10-PCS | Mod: CPTII,95,, | Performed by: PHYSICIAN ASSISTANT

## 2022-04-11 PROCEDURE — 1159F MED LIST DOCD IN RCRD: CPT | Mod: CPTII,95,, | Performed by: PHYSICIAN ASSISTANT

## 2022-04-11 PROCEDURE — 99213 OFFICE O/P EST LOW 20 MIN: CPT | Mod: 95,,, | Performed by: PHYSICIAN ASSISTANT

## 2022-04-11 PROCEDURE — 1160F RVW MEDS BY RX/DR IN RCRD: CPT | Mod: CPTII,95,, | Performed by: PHYSICIAN ASSISTANT

## 2022-04-11 PROCEDURE — 1160F PR REVIEW ALL MEDS BY PRESCRIBER/CLIN PHARMACIST DOCUMENTED: ICD-10-PCS | Mod: CPTII,95,, | Performed by: PHYSICIAN ASSISTANT

## 2022-04-11 RX ORDER — LAMOTRIGINE 200 MG/1
200 TABLET ORAL 2 TIMES DAILY
Qty: 60 TABLET | Refills: 0 | Status: SHIPPED | OUTPATIENT
Start: 2022-04-11 | End: 2022-04-11 | Stop reason: SDUPTHER

## 2022-04-11 RX ORDER — PHENOBARBITAL 64.8 MG/1
TABLET ORAL
Qty: 90 TABLET | Refills: 5 | Status: SHIPPED | OUTPATIENT
Start: 2022-04-11 | End: 2022-10-10

## 2022-04-11 RX ORDER — TOPIRAMATE 200 MG/1
200 TABLET ORAL 2 TIMES DAILY
Qty: 60 TABLET | Refills: 0 | Status: SHIPPED | OUTPATIENT
Start: 2022-04-11 | End: 2022-04-11 | Stop reason: SDUPTHER

## 2022-04-11 RX ORDER — PHENOBARBITAL 64.8 MG/1
TABLET ORAL
Qty: 90 TABLET | Refills: 0 | Status: SHIPPED | OUTPATIENT
Start: 2022-04-11 | End: 2022-04-11 | Stop reason: SDUPTHER

## 2022-04-11 RX ORDER — CLONAZEPAM 0.5 MG/1
0.5 TABLET ORAL NIGHTLY
Qty: 30 TABLET | Refills: 0 | Status: SHIPPED | OUTPATIENT
Start: 2022-04-11 | End: 2022-04-11 | Stop reason: SDUPTHER

## 2022-04-11 RX ORDER — CHLORPROMAZINE HYDROCHLORIDE 25 MG/1
25 TABLET, FILM COATED ORAL 2 TIMES DAILY PRN
Qty: 15 TABLET | Refills: 2 | Status: SHIPPED | OUTPATIENT
Start: 2022-04-11 | End: 2022-10-11 | Stop reason: SDUPTHER

## 2022-04-11 RX ORDER — TOPIRAMATE 200 MG/1
200 TABLET ORAL 2 TIMES DAILY
Qty: 60 TABLET | Refills: 5 | Status: SHIPPED | OUTPATIENT
Start: 2022-04-11 | End: 2022-07-15 | Stop reason: SDUPTHER

## 2022-04-11 RX ORDER — DIAZEPAM 10 MG/1
TABLET ORAL
Qty: 90 TABLET | Refills: 0 | Status: SHIPPED | OUTPATIENT
Start: 2022-04-11 | End: 2022-04-11 | Stop reason: SDUPTHER

## 2022-04-11 RX ORDER — CLONAZEPAM 0.5 MG/1
0.5 TABLET ORAL NIGHTLY
Qty: 30 TABLET | Refills: 5 | Status: SHIPPED | OUTPATIENT
Start: 2022-04-11 | End: 2022-05-30

## 2022-04-11 RX ORDER — LAMOTRIGINE 200 MG/1
200 TABLET ORAL 2 TIMES DAILY
Qty: 60 TABLET | Refills: 5 | Status: SHIPPED | OUTPATIENT
Start: 2022-04-11 | End: 2022-10-11 | Stop reason: SDUPTHER

## 2022-04-11 RX ORDER — DIAZEPAM 10 MG/1
TABLET ORAL
Qty: 90 TABLET | Refills: 5 | Status: SHIPPED | OUTPATIENT
Start: 2022-04-11 | End: 2022-05-30

## 2022-04-11 RX ORDER — CHLORPROMAZINE HYDROCHLORIDE 25 MG/1
25 TABLET, FILM COATED ORAL 2 TIMES DAILY PRN
Qty: 15 TABLET | Refills: 0 | Status: SHIPPED | OUTPATIENT
Start: 2022-04-11 | End: 2022-04-11 | Stop reason: SDUPTHER

## 2022-04-11 NOTE — PROGRESS NOTES
The patient location is: home  The chief complaint leading to consultation is: seizures, hiccups    Visit type: audiovisual    Face to Face time with patient: 16  33 minutes of total time spent on the encounter, which includes face to face time and non-face to face time preparing to see the patient (eg, review of tests), Obtaining and/or reviewing separately obtained history, Documenting clinical information in the electronic or other health record, Independently interpreting results (not separately reported) and communicating results to the patient/family/caregiver, or Care coordination (not separately reported).         Each patient to whom he or she provides medical services by telemedicine is:  (1) informed of the relationship between the physician and patient and the respective role of any other health care provider with respect to management of the patient; and (2) notified that he or she may decline to receive medical services by telemedicine and may withdraw from such care at any time.    Notes:     Subjective:       Patient ID: Lamont Villarreal is a 26 y.o. male.    Chief Complaint: Neurologic Problem (Follow up)      HPI:  Lamont Villarreal is a 26 y.o. male is here for follow up visit regarding epilepsy, developmental delay, intellectual disability, intractable hiccups. Medications and tolerability were reviewed with patient's mother and primary caregiver. Lamont is nonverbal. Mother states compliance with seizure medications.  No seizures in the past 2 months. He was hospitalized in February for aspiration pneumonia secondary to intractable hiccups. He periodically has hiccups lasting over a week. These episodes are often associated with vomiting, inability to keep meds down. He was septic secondary to the pneumonia. He was in ICU for a while. He recovered and was discharged.    He has had no further seizures or hiccups since. Mother states he is doing well. He is on Diazepam, Epidiolex, Topamax and  phenobarbital for seizures. He takes 0.5 mg of cloazepam to sleep at night.    He was prescribed thorazine for hiccups, but he has not needed it. He never got the rx and needs new.    Past Medical History:   Diagnosis Date    Developmental delay     Mental disorder     Seizures        History reviewed. No pertinent surgical history.    History reviewed. No pertinent family history.    Social History     Socioeconomic History    Marital status: Single   Tobacco Use    Smoking status: Never Smoker    Smokeless tobacco: Never Used   Substance and Sexual Activity    Alcohol use: No    Sexual activity: Never       Current Outpatient Medications   Medication Sig Dispense Refill    cannabidioL (EPIDIOLEX) 100 mg/mL Take 4 ml by mouth twice a day for seizures. 240 mL 5    chlorproMAZINE (THORAZINE) 25 MG tablet Take 1 tablet (25 mg total) by mouth 2 (two) times daily as needed (hiccups). 15 tablet 0    clonazePAM (KLONOPIN) 0.5 MG tablet Take 1 tablet (0.5 mg total) by mouth every evening. 30 tablet 0    diazePAM (VALIUM) 10 MG Tab One three times daily 90 tablet 0    lamoTRIgine (LAMICTAL) 200 MG tablet Take 1 tablet (200 mg total) by mouth 2 (two) times daily. Take one tablet twice a day. 60 tablet 0    PHENobarbitaL (LUMINAL) 64.8 MG tablet TAKE 3 TABLETS BY MOUTH ONCE DAILY AT BEDTIME. NEEDS APPOINTMENT 90 tablet 0    topiramate (TOPAMAX) 200 MG Tab Take 1 tablet (200 mg total) by mouth 2 (two) times daily. 60 tablet 0     No current facility-administered medications for this visit.       Review of patient's allergies indicates:  No Known Allergies        Review of Systems  non verbal  Objective:      Neurologic Exam  Physical Exam  limited. Patient seated asleep in living room.  Assessment:       1. Seizure disorder        2 Developmental delay       3. Intractable hiccups        Plan:   Discussed risks and benefits of potential treatment options as well as potential side effects of medications.  Patient  was counseled to continue current medications. Seizure precautions were discussed. Specifically discussed driving restrictions per [place] law. Medication compliance discussed. Instructed to call clinic if concerned or to ED if emergency.    Seizure disorder  -     clonazePAM (KLONOPIN) 0.5 MG tablet; Take 1 tablet (0.5 mg total) by mouth every evening.  Dispense: 30 tablet; Refill: 0  -     diazePAM (VALIUM) 10 MG Tab; One three times daily  Dispense: 90 tablet; Refill: 0  -     lamoTRIgine (LAMICTAL) 200 MG tablet; Take 1 tablet (200 mg total) by mouth 2 (two) times daily. Take one tablet twice a day.  Dispense: 60 tablet; Refill: 0  -     PHENobarbitaL (LUMINAL) 64.8 MG tablet; TAKE 3 TABLETS BY MOUTH ONCE DAILY AT BEDTIME. NEEDS APPOINTMENT  Dispense: 90 tablet; Refill: 0  -     topiramate (TOPAMAX) 200 MG Tab; Take 1 tablet (200 mg total) by mouth 2 (two) times daily.  Dispense: 60 tablet; Refill: 0  Continue Epidiolex. No refills needed at this time.    Intractable hiccups  -     chlorproMAZINE (THORAZINE) 25 MG tablet; Take 1 tablet (25 mg total) by mouth 2 (two) times daily as needed (hiccups).  Dispense: 15 tablet; Refill: 0  Mother will use as needed. Not needed lately.        BRIAN Cortez

## 2022-04-18 ENCOUNTER — PATIENT MESSAGE (OUTPATIENT)
Dept: ADMINISTRATIVE | Facility: OTHER | Age: 27
End: 2022-04-18
Payer: COMMERCIAL

## 2022-04-29 ENCOUNTER — SPECIALTY PHARMACY (OUTPATIENT)
Dept: PHARMACY | Facility: CLINIC | Age: 27
End: 2022-04-29
Payer: COMMERCIAL

## 2022-04-29 NOTE — TELEPHONE ENCOUNTER
Specialty Pharmacy - Refill Coordination    Specialty Medication Orders Linked to Encounter    Flowsheet Row Most Recent Value   Medication #1 cannabidioL (EPIDIOLEX) 100 mg/mL (Order#[id number], Rx#[id number])          Refill Questions - Documented Responses    Flowsheet Row Most Recent Value   Patient Availability and HIPAA Verification    Does patient want to proceed with activity? Yes   HIPAA/medical authority confirmed? Yes   Relationship to patient of person spoken to? Mother   Refill Screening Questions    Changes to allergies? No   Changes to medications? No   New conditions since last clinic visit? No   Unplanned office visit, urgent care, ED, or hospital admission in the last 4 weeks? No   How does patient/caregiver feel medication is working? Good   Financial problems or insurance changes? No   How many doses of your specialty medications were missed in the last 4 weeks? 0   Would patient like to speak to a pharmacist? No   When does the patient need to receive the medication? 05/05/22   Refill Delivery Questions    How will the patient receive the medication? Delivery Viri   When does the patient need to receive the medication? 05/05/22   Shipping Address Home   Address in [facility] confirmed and updated if neccessary? Yes   Expected Copay ($) 0   Is the patient able to afford the medication copay? Yes   Payment Method zero copay   Days supply of Refill 25          Current Outpatient Medications   Medication Sig    cannabidioL (EPIDIOLEX) 100 mg/mL Take 4 ml by mouth twice a day for seizures.    chlorproMAZINE (THORAZINE) 25 MG tablet Take 1 tablet (25 mg total) by mouth 2 (two) times daily as needed (hiccups).    clonazePAM (KLONOPIN) 0.5 MG tablet Take 1 tablet (0.5 mg total) by mouth every evening.    diazePAM (VALIUM) 10 MG Tab One three times daily    lamoTRIgine (LAMICTAL) 200 MG tablet Take 1 tablet (200 mg total) by mouth 2 (two) times daily. Take one tablet twice a day.     PHENobarbitaL (LUMINAL) 64.8 MG tablet TAKE 3 TABLETS BY MOUTH ONCE DAILY AT BEDTIME. NEEDS APPOINTMENT    topiramate (TOPAMAX) 200 MG Tab Take 1 tablet (200 mg total) by mouth 2 (two) times daily.   Last reviewed on 4/11/2022  3:14 PM by BRIAN Morrison    Review of patient's allergies indicates:  No Known Allergies Last reviewed on  4/11/2022 3:09 PM by Kenisha Gilliam      Tasks added this encounter   5/23/2022 - Refill Call (Auto Added)   Tasks due within next 3 months   No tasks due.     rAy Mandujano, PharmD  [facility]  [address] [facility] [address]  Phone: [phone]  Fax: [phone]

## 2022-05-23 ENCOUNTER — SPECIALTY PHARMACY (OUTPATIENT)
Dept: PHARMACY | Facility: CLINIC | Age: 27
End: 2022-05-23
Payer: COMMERCIAL

## 2022-05-23 NOTE — TELEPHONE ENCOUNTER
Specialty Pharmacy - Refill Coordination    Specialty Medication Orders Linked to Encounter    Flowsheet Row Most Recent Value   Medication #1 cannabidioL (EPIDIOLEX) 100 mg/mL (Order#[id number], Rx#[id number])          Refill Questions - Documented Responses    Flowsheet Row Most Recent Value   Patient Availability and HIPAA Verification    Does patient want to proceed with activity? Yes   HIPAA/medical authority confirmed? Yes   Relationship to patient of person spoken to? Mother   Refill Screening Questions    Changes to allergies? No   Changes to medications? No   New conditions since last clinic visit? No   Unplanned office visit, urgent care, ED, or hospital admission in the last 4 weeks? No   How does patient/caregiver feel medication is working? Good   Financial problems or insurance changes? No   How many doses of your specialty medications were missed in the last 4 weeks? 0   Would patient like to speak to a pharmacist? No   When does the patient need to receive the medication? 05/30/22   Refill Delivery Questions    How will the patient receive the medication? Delivery Viri   When does the patient need to receive the medication? 05/30/22   Shipping Address Home   Address in [facility] confirmed and updated if neccessary? Yes   Expected Copay ($) 0   Is the patient able to afford the medication copay? Yes   Payment Method zero copay   Days supply of Refill 30   Supplies needed? No supplies needed   Refill activity completed? Yes   Refill activity plan Refill scheduled   Shipment/Pickup Date: 05/26/22          Current Outpatient Medications   Medication Sig    cannabidioL (EPIDIOLEX) 100 mg/mL Take 4 ml by mouth twice a day for seizures.    chlorproMAZINE (THORAZINE) 25 MG tablet Take 1 tablet (25 mg total) by mouth 2 (two) times daily as needed (hiccups).    clonazePAM (KLONOPIN) 0.5 MG tablet Take 1 tablet (0.5 mg total) by mouth every evening.    diazePAM (VALIUM) 10 MG Tab One three times  daily    lamoTRIgine (LAMICTAL) 200 MG tablet Take 1 tablet (200 mg total) by mouth 2 (two) times daily. Take one tablet twice a day.    PHENobarbitaL (LUMINAL) 64.8 MG tablet TAKE 3 TABLETS BY MOUTH ONCE DAILY AT BEDTIME. NEEDS APPOINTMENT    topiramate (TOPAMAX) 200 MG Tab Take 1 tablet (200 mg total) by mouth 2 (two) times daily.   Last reviewed on 4/11/2022  3:14 PM by BRIAN Morrison    Review of patient's allergies indicates:  No Known Allergies Last reviewed on  4/11/2022 3:09 PM by Kenisha Gilliam      Tasks added this encounter   6/22/2022 - Refill Call (Auto Added)   Tasks due within next 3 months   No tasks due.     Gill Bales, PharmD  [facility]  [address] [place] [address]  Phone: [phone]  Fax: [phone]

## 2022-06-28 ENCOUNTER — SPECIALTY PHARMACY (OUTPATIENT)
Dept: PHARMACY | Facility: CLINIC | Age: 27
End: 2022-06-28
Payer: COMMERCIAL

## 2022-06-28 NOTE — TELEPHONE ENCOUNTER
Specialty Pharmacy - Refill Coordination    Specialty Medication Orders Linked to Encounter    Flowsheet Row Most Recent Value   Medication #1 cannabidioL (EPIDIOLEX) 100 mg/mL (Order#[id number], Rx#[id number])          Refill Questions - Documented Responses    Flowsheet Row Most Recent Value   Patient Availability and HIPAA Verification    Does patient want to proceed with activity? Yes   HIPAA/medical authority confirmed? Yes   Relationship to patient of person spoken to? Mother   Refill Screening Questions    Changes to allergies? No   Changes to medications? No   New conditions since last clinic visit? No   Unplanned office visit, urgent care, ED, or hospital admission in the last 4 weeks? No   How does patient/caregiver feel medication is working? Good   Financial problems or insurance changes? No   How many doses of your specialty medications were missed in the last 4 weeks? 0   Would patient like to speak to a pharmacist? No   When does the patient need to receive the medication? 06/29/22   Refill Delivery Questions    How will the patient receive the medication? Delivery Viri   When does the patient need to receive the medication? 06/29/22   Shipping Address Home   Address in [facility] confirmed and updated if neccessary? Yes   Expected Copay ($) 0   Is the patient able to afford the medication copay? Yes   Payment Method zero copay   Days supply of Refill 30   Supplies needed? No supplies needed   Refill activity completed? Yes   Refill activity plan Refill scheduled   Shipment/Pickup Date: 06/29/22          Current Outpatient Medications   Medication Sig    cannabidioL (EPIDIOLEX) 100 mg/mL Take 4 ml by mouth twice a day for seizures.    chlorproMAZINE (THORAZINE) 25 MG tablet Take 1 tablet (25 mg total) by mouth 2 (two) times daily as needed (hiccups).    clonazePAM (KLONOPIN) 0.5 MG tablet Take 1 tablet by mouth in the evening    diazePAM (VALIUM) 10 MG Tab TAKE 1 TABLET BY MOUTH THREE TIMES  DAILY    lamoTRIgine (LAMICTAL) 200 MG tablet Take 1 tablet (200 mg total) by mouth 2 (two) times daily. Take one tablet twice a day.    PHENobarbitaL (LUMINAL) 64.8 MG tablet TAKE 3 TABLETS BY MOUTH ONCE DAILY AT BEDTIME. NEEDS APPOINTMENT    topiramate (TOPAMAX) 200 MG Tab Take 1 tablet (200 mg total) by mouth 2 (two) times daily.   Last reviewed on 4/11/2022  3:14 PM by BRIAN Morrison    Review of patient's allergies indicates:  No Known Allergies Last reviewed on  4/11/2022 3:09 PM by Kenisha Gilliam      Tasks added this encounter   7/28/2022 - Refill Call (Auto Added)   Tasks due within next 3 months   No tasks due.     Ary Mandujano, PharmD  [facility]  [address] [place] [address]  Phone: [phone]  Fax: [phone]

## 2022-07-22 ENCOUNTER — TELEPHONE (OUTPATIENT)
Dept: NEUROLOGY | Facility: CLINIC | Age: 27
End: 2022-07-22
Payer: COMMERCIAL

## 2022-07-22 NOTE — TELEPHONE ENCOUNTER
----- Message from Cara Casillas MA sent at 2022  3:09 PM CDT -----  Contact: Azam / mother  Lamont Villarreal  MRN: [id number]  : 1995  PCP: Hollis Thomas  Home Phone      [phone]  Work Phone      Not on file.  Mobile          [phone]      MESSAGE: Needs new Rx for Phenobarbital sent to [facility] (new pharmacy).  Stated they only 16.2 mg and 97.2 mg only.      Phone:  [phone]  Pharmacy: [facility]

## 2022-07-22 NOTE — TELEPHONE ENCOUNTER
Patients mother Nolberto states that the pharmacy has not been able to get the patients prescribed Phenobarbital 64.8 mg, 3 tabs po qhs. [facility]'s pharmacy does have the 97.2 mg dose. Rx Phenobarbital 97.2 mg, #60, 2 tabs po qhs, 5 refills called in to [facility]'s pharmacy.

## 2022-07-28 ENCOUNTER — SPECIALTY PHARMACY (OUTPATIENT)
Dept: PHARMACY | Facility: CLINIC | Age: 27
End: 2022-07-28
Payer: COMMERCIAL

## 2022-07-28 NOTE — TELEPHONE ENCOUNTER
Specialty Pharmacy - Refill Coordination    Specialty Medication Orders Linked to Encounter    Flowsheet Row Most Recent Value   Medication #1 cannabidioL (EPIDIOLEX) 100 mg/mL (Order#[id number], Rx#[id number])          Refill Questions - Documented Responses    Flowsheet Row Most Recent Value   Patient Availability and HIPAA Verification    Does patient want to proceed with activity? Yes   HIPAA/medical authority confirmed? Yes   Relationship to patient of person spoken to? Mother   Refill Screening Questions    Changes to allergies? No   Changes to medications? No   New conditions since last clinic visit? No   Unplanned office visit, urgent care, ED, or hospital admission in the last 4 weeks? No   How does patient/caregiver feel medication is working? Good   Financial problems or insurance changes? No   How many doses of your specialty medications were missed in the last 4 weeks? 0   Would patient like to speak to a pharmacist? No   When does the patient need to receive the medication? 07/31/22   Refill Delivery Questions    How will the patient receive the medication? Delivery Viri   When does the patient need to receive the medication? 07/31/22   Shipping Address Home   Address in [facility] confirmed and updated if neccessary? Yes   Expected Copay ($) 0   Is the patient able to afford the medication copay? Yes   Payment Method zero copay   Days supply of Refill 30   Supplies needed? No supplies needed   Refill activity completed? Yes   Refill activity plan Refill scheduled   Shipment/Pickup Date: 07/28/22          Current Outpatient Medications   Medication Sig    cannabidioL (EPIDIOLEX) 100 mg/mL Take 4 ml by mouth twice a day for seizures.    chlorproMAZINE (THORAZINE) 25 MG tablet Take 1 tablet (25 mg total) by mouth 2 (two) times daily as needed (hiccups).    clonazePAM (KLONOPIN) 0.5 MG tablet Take 1 tablet by mouth in the evening    diazePAM (VALIUM) 10 MG Tab TAKE 1 TABLET BY MOUTH THREE TIMES  DAILY    lamoTRIgine (LAMICTAL) 200 MG tablet Take 1 tablet (200 mg total) by mouth 2 (two) times daily. Take one tablet twice a day.    PHENobarbitaL (LUMINAL) 64.8 MG tablet TAKE 3 TABLETS BY MOUTH ONCE DAILY AT BEDTIME. NEEDS APPOINTMENT    topiramate (TOPAMAX) 200 MG Tab Take 1 tablet by mouth twice daily   Last reviewed on 4/11/2022  3:14 PM by BRIAN Morrison    Review of patient's allergies indicates:  No Known Allergies Last reviewed on  4/11/2022 3:09 PM by Kenisha Gilliam      Tasks added this encounter   8/23/2022 - Refill Call (Auto Added)   Tasks due within next 3 months   No tasks due.     Adriana Mcelroy, PharmD  [facility]  [address] [place] [address]  Phone: [phone]  Fax: [phone]

## 2022-08-24 ENCOUNTER — SPECIALTY PHARMACY (OUTPATIENT)
Dept: PHARMACY | Facility: CLINIC | Age: 27
End: 2022-08-24
Payer: COMMERCIAL

## 2022-08-24 NOTE — TELEPHONE ENCOUNTER
Specialty Pharmacy - Refill Coordination    Specialty Medication Orders Linked to Encounter    Flowsheet Row Most Recent Value   Medication #1 cannabidioL (EPIDIOLEX) 100 mg/mL (Order#[id number], Rx#[id number])          Refill Questions - Documented Responses    Flowsheet Row Most Recent Value   Patient Availability and HIPAA Verification    Does patient want to proceed with activity? Yes   HIPAA/medical authority confirmed? Yes   Relationship to patient of person spoken to? Mother   Refill Screening Questions    Changes to allergies? No   Changes to medications? No   New conditions since last clinic visit? No   Unplanned office visit, urgent care, ED, or hospital admission in the last 4 weeks? No   How does patient/caregiver feel medication is working? Good   Financial problems or insurance changes? No   How many doses of your specialty medications were missed in the last 4 weeks? 0   Would patient like to speak to a pharmacist? No   When does the patient need to receive the medication? 08/27/22   Refill Delivery Questions    How will the patient receive the medication? Delivery Viri   When does the patient need to receive the medication? 08/27/22   Shipping Address Home   Address in [facility] confirmed and updated if neccessary? Yes   Expected Copay ($) 0   Is the patient able to afford the medication copay? Yes   Payment Method zero copay   Days supply of Refill 30   Supplies needed? No supplies needed   Refill activity completed? Yes   Refill activity plan Refill scheduled   Shipment/Pickup Date: 08/25/22          Current Outpatient Medications   Medication Sig    cannabidioL (EPIDIOLEX) 100 mg/mL Take 4 ml by mouth twice a day for seizures.    chlorproMAZINE (THORAZINE) 25 MG tablet Take 1 tablet (25 mg total) by mouth 2 (two) times daily as needed (hiccups).    clonazePAM (KLONOPIN) 0.5 MG tablet Take 1 tablet by mouth in the evening    diazePAM (VALIUM) 10 MG Tab TAKE 1 TABLET BY MOUTH THREE TIMES  DAILY    lamoTRIgine (LAMICTAL) 200 MG tablet Take 1 tablet (200 mg total) by mouth 2 (two) times daily. Take one tablet twice a day.    PHENobarbitaL (LUMINAL) 64.8 MG tablet TAKE 3 TABLETS BY MOUTH ONCE DAILY AT BEDTIME. NEEDS APPOINTMENT    topiramate (TOPAMAX) 200 MG Tab Take 1 tablet by mouth twice daily   Last reviewed on 4/11/2022  3:14 PM by BRIAN Morrison    Review of patient's allergies indicates:  No Known Allergies Last reviewed on  4/11/2022 3:09 PM by Kenisha Gilliam      Tasks added this encounter   9/19/2022 - Refill Call (Auto Added)   Tasks due within next 3 months   No tasks due.     Adriana Mcelroy, PharmD  [facility]  [address] [place] [address]  Phone: [phone]  Fax: [phone]

## 2022-09-19 ENCOUNTER — SPECIALTY PHARMACY (OUTPATIENT)
Dept: PHARMACY | Facility: CLINIC | Age: 27
End: 2022-09-19
Payer: COMMERCIAL

## 2022-09-19 DIAGNOSIS — G40.814 LENNOX-GASTAUT SYNDROME, INTRACTABLE, WITHOUT STATUS EPILEPTICUS: ICD-10-CM

## 2022-09-19 DIAGNOSIS — G40.909 SEIZURE DISORDER: ICD-10-CM

## 2022-09-19 RX ORDER — CANNABIDIOL 100 MG/ML
SOLUTION ORAL
Qty: 240 ML | Refills: 5 | Status: SHIPPED | OUTPATIENT
Start: 2022-09-19 | End: 2023-03-13 | Stop reason: SDUPTHER

## 2022-09-19 NOTE — TELEPHONE ENCOUNTER
----- Message from Ary Mandujano PharmD sent at 9/19/2022 11:09 AM CDT -----  Regarding: Epidiolex Refills  Good Morning,    Lamont is in need of a new Epidiolex rx. He has about a 10 day supply onhand. Please send a new prescription to [facility].    Let me know if you have any questions or concerns!      Thank You,      Ary Mandujano, PharmD    Specialty Pharmacy Clinical Pharmacist  [facility]  P: [phone]

## 2022-09-19 NOTE — TELEPHONE ENCOUNTER
Specialty Pharmacy - Refill Coordination    Specialty Medication Orders Linked to Encounter      Flowsheet Row Most Recent Value   Medication #1 cannabidioL (EPIDIOLEX) 100 mg/mL (Order#[id number], Rx#[id number])        Current RX only has 1 60 ml bottle (7 Day Supply) left. Sent out due to patient running out. Messaged provider for new rx and CC'd [facility] Pharmacist.     Refill Questions - Documented Responses      Flowsheet Row Most Recent Value   Patient Availability and HIPAA Verification    Does patient want to proceed with activity? Yes   HIPAA/medical authority confirmed? Yes   Relationship to patient of person spoken to? Mother   Refill Screening Questions    Changes to allergies? No   Changes to medications? No   New conditions since last clinic visit? No   Unplanned office visit, urgent care, ED, or hospital admission in the last 4 weeks? No   How does patient/caregiver feel medication is working? Good   How many doses of your specialty medications were missed in the last 4 weeks? 0   Would patient like to speak to a pharmacist? No   When does the patient need to receive the medication? 09/22/22   Refill Delivery Questions    How will the patient receive the medication? Mail   When does the patient need to receive the medication? 09/22/22   Shipping Address Home   Address in [facility] confirmed and updated if neccessary? Yes   Expected Copay ($) 0   Is the patient able to afford the medication copay? Yes   Payment Method zero copay   Days supply of Refill 7   Supplies needed? No supplies needed   Refill activity completed? Yes   Refill activity plan Refill scheduled   Shipment/Pickup Date: 09/19/22            Current Outpatient Medications   Medication Sig    cannabidioL (EPIDIOLEX) 100 mg/mL Take 4 ml by mouth twice a day for seizures.    chlorproMAZINE (THORAZINE) 25 MG tablet Take 1 tablet (25 mg total) by mouth 2 (two) times daily as needed (hiccups).    clonazePAM (KLONOPIN) 0.5 MG tablet Take 1  tablet by mouth in the evening    diazePAM (VALIUM) 10 MG Tab TAKE 1 TABLET BY MOUTH THREE TIMES DAILY    lamoTRIgine (LAMICTAL) 200 MG tablet Take 1 tablet (200 mg total) by mouth 2 (two) times daily. Take one tablet twice a day.    PHENobarbitaL (LUMINAL) 64.8 MG tablet TAKE 3 TABLETS BY MOUTH ONCE DAILY AT BEDTIME. NEEDS APPOINTMENT    topiramate (TOPAMAX) 200 MG Tab Take 1 tablet by mouth twice daily   Last reviewed on 4/11/2022  3:14 PM by BRIAN Morrison    Review of patient's allergies indicates:  No Known Allergies Last reviewed on  4/11/2022 3:09 PM by Kenisha Gilliam      Tasks added this encounter   9/22/2022 - Refill Call (Auto Added)   Tasks due within next 3 months   No tasks due.     Ary Mandujano, PharmD  [facility]  [address] [place] [address]  Phone: [phone]  Fax: [phone]

## 2022-09-22 ENCOUNTER — SPECIALTY PHARMACY (OUTPATIENT)
Dept: PHARMACY | Facility: CLINIC | Age: 27
End: 2022-09-22
Payer: COMMERCIAL

## 2022-09-22 NOTE — TELEPHONE ENCOUNTER
Outgoing call for Epidiolex refill. Patient's mother reported the patient has 1.5 bottles remaining on hand. Refill to soon reject until 9/25/2022. Will pend refill call accordingly.

## 2022-09-26 NOTE — TELEPHONE ENCOUNTER
Specialty Pharmacy - Refill Coordination    Specialty Medication Orders Linked to Encounter      Flowsheet Row Most Recent Value   Medication #1 cannabidioL (EPIDIOLEX) 100 mg/mL (Order#[id number], Rx#[id number])            Refill Questions - Documented Responses      Flowsheet Row Most Recent Value   Patient Availability and HIPAA Verification    Does patient want to proceed with activity? Yes   HIPAA/medical authority confirmed? Yes   Relationship to patient of person spoken to? Mother   Refill Screening Questions    Changes to allergies? No   Changes to medications? No   New conditions since last clinic visit? No   Unplanned office visit, urgent care, ED, or hospital admission in the last 4 weeks? No   How does patient/caregiver feel medication is working? Good   Financial problems or insurance changes? No   How many doses of your specialty medications were missed in the last 4 weeks? 0   Would patient like to speak to a pharmacist? No   When does the patient need to receive the medication? 10/01/22   Refill Delivery Questions    How will the patient receive the medication? Mail   When does the patient need to receive the medication? 10/01/22   Shipping Address Home   Address in [facility] confirmed and updated if neccessary? Yes   Expected Copay ($) 0   Is the patient able to afford the medication copay? Yes   Payment Method zero copay   Days supply of Refill 30   Supplies needed? No supplies needed   Refill activity completed? Yes   Refill activity plan Refill scheduled   Shipment/Pickup Date: 09/28/22            Current Outpatient Medications   Medication Sig    cannabidioL (EPIDIOLEX) 100 mg/mL Take 4 ml by mouth twice a day for seizures.    chlorproMAZINE (THORAZINE) 25 MG tablet Take 1 tablet (25 mg total) by mouth 2 (two) times daily as needed (hiccups).    clonazePAM (KLONOPIN) 0.5 MG tablet Take 1 tablet by mouth in the evening    diazePAM (VALIUM) 10 MG Tab TAKE 1 TABLET BY MOUTH THREE TIMES  DAILY    lamoTRIgine (LAMICTAL) 200 MG tablet Take 1 tablet (200 mg total) by mouth 2 (two) times daily. Take one tablet twice a day.    PHENobarbitaL (LUMINAL) 64.8 MG tablet TAKE 3 TABLETS BY MOUTH ONCE DAILY AT BEDTIME. NEEDS APPOINTMENT    topiramate (TOPAMAX) 200 MG Tab Take 1 tablet by mouth twice daily   Last reviewed on 4/11/2022  3:14 PM by BRIAN Morrison    Review of patient's allergies indicates:  No Known Allergies Last reviewed on  4/11/2022 3:09 PM by Kenisha Gilliam      Tasks added this encounter   No tasks added.   Tasks due within next 3 months   9/22/2022 - Refill Call (Auto Added)     Brina Lundy, PharmD  [facility]  [address] [place] [id number]  Phone: [phone]  Fax: [phone]

## 2022-10-11 ENCOUNTER — OFFICE VISIT (OUTPATIENT)
Dept: NEUROLOGY | Facility: CLINIC | Age: 27
End: 2022-10-11
Payer: COMMERCIAL

## 2022-10-11 DIAGNOSIS — G40.909 SEIZURE DISORDER: Primary | ICD-10-CM

## 2022-10-11 DIAGNOSIS — R06.6 INTRACTABLE HICCUPS: ICD-10-CM

## 2022-10-11 DIAGNOSIS — R62.50 DEVELOPMENTAL DELAY: ICD-10-CM

## 2022-10-11 PROCEDURE — 1160F PR REVIEW ALL MEDS BY PRESCRIBER/CLIN PHARMACIST DOCUMENTED: ICD-10-PCS | Mod: CPTII,95,, | Performed by: PHYSICIAN ASSISTANT

## 2022-10-11 PROCEDURE — 1159F MED LIST DOCD IN RCRD: CPT | Mod: CPTII,95,, | Performed by: PHYSICIAN ASSISTANT

## 2022-10-11 PROCEDURE — 1160F RVW MEDS BY RX/DR IN RCRD: CPT | Mod: CPTII,95,, | Performed by: PHYSICIAN ASSISTANT

## 2022-10-11 PROCEDURE — 99213 OFFICE O/P EST LOW 20 MIN: CPT | Mod: 95,,, | Performed by: PHYSICIAN ASSISTANT

## 2022-10-11 PROCEDURE — 1159F PR MEDICATION LIST DOCUMENTED IN MEDICAL RECORD: ICD-10-PCS | Mod: CPTII,95,, | Performed by: PHYSICIAN ASSISTANT

## 2022-10-11 PROCEDURE — 99213 PR OFFICE/OUTPT VISIT, EST, LEVL III, 20-29 MIN: ICD-10-PCS | Mod: 95,,, | Performed by: PHYSICIAN ASSISTANT

## 2022-10-11 RX ORDER — CLONAZEPAM 0.5 MG/1
0.5 TABLET ORAL NIGHTLY
Qty: 30 TABLET | Refills: 5 | Status: SHIPPED | OUTPATIENT
Start: 2022-10-11 | End: 2023-05-02

## 2022-10-11 RX ORDER — DIAZEPAM 10 MG/1
TABLET ORAL
Qty: 90 TABLET | Refills: 5 | Status: SHIPPED | OUTPATIENT
Start: 2022-10-11 | End: 2023-04-10 | Stop reason: SDUPTHER

## 2022-10-11 RX ORDER — CHLORPROMAZINE HYDROCHLORIDE 25 MG/1
25 TABLET, FILM COATED ORAL 2 TIMES DAILY PRN
Qty: 15 TABLET | Refills: 3 | Status: SHIPPED | OUTPATIENT
Start: 2022-10-11

## 2022-10-11 RX ORDER — PHENOBARBITAL 64.8 MG/1
TABLET ORAL
Qty: 90 TABLET | Refills: 5 | Status: SHIPPED | OUTPATIENT
Start: 2022-10-11 | End: 2023-04-25

## 2022-10-11 RX ORDER — TOPIRAMATE 200 MG/1
200 TABLET ORAL 2 TIMES DAILY
Qty: 60 TABLET | Refills: 5 | Status: SHIPPED | OUTPATIENT
Start: 2022-10-11 | End: 2023-05-02 | Stop reason: SDUPTHER

## 2022-10-11 RX ORDER — LAMOTRIGINE 200 MG/1
200 TABLET ORAL 2 TIMES DAILY
Qty: 60 TABLET | Refills: 5 | Status: SHIPPED | OUTPATIENT
Start: 2022-10-11 | End: 2023-05-02 | Stop reason: SDUPTHER

## 2022-10-11 NOTE — PROGRESS NOTES
The patient location is: home in [place]  The chief complaint leading to consultation is: seizures, hiccups    Visit type: audiovisual    Face to Face time with patient: 14  25 minutes of total time spent on the encounter, which includes face to face time and non-face to face time preparing to see the patient (eg, review of tests), Obtaining and/or reviewing separately obtained history, Documenting clinical information in the electronic or other health record, Independently interpreting results (not separately reported) and communicating results to the patient/family/caregiver, or Care coordination (not separately reported).         Each patient to whom he or she provides medical services by telemedicine is:  (1) informed of the relationship between the physician and patient and the respective role of any other health care provider with respect to management of the patient; and (2) notified that he or she may decline to receive medical services by telemedicine and may withdraw from such care at any time.    Notes:     Subjective:       Patient ID: Lamont Villarreal is a 27 y.o. male.    Chief Complaint: seizures, intractable hiccups, developmental delay.      HPI:  Lamont Villarreal is a 27 y.o. male is here for follow up visit regarding epilepsy, developmental delay, intellectual disability, intractable hiccups. Medications and tolerability were reviewed with patient's mother and primary caregiver. Lamont is nonverbal. Mother states compliance with seizure medications.  No seizures in the past 2 months. He has had the best seizure control over the past 6 months than he has ever had per mother.He periodically has hiccups lasting over a week. These episodes are often associated with vomiting, inability to keep meds down. Hiccups have not been intractable since last visit in March. Mother has not needed to use thorazine since then. He will have short lived hiccups only.    He is on Diazepam, Epidiolex, Topamax and  phenobarbital for seizures. He takes 0.5 mg of cloazepam to sleep at night. Medications have been unchanged for the past few years. Epidiolex was added most recently, but over a year ago. He is doing much better since the addition of Epidiolex.        Past Medical History:   Diagnosis Date    Developmental delay     Mental disorder     Seizures        No past surgical history on file.    No family history on file.    Social History     Socioeconomic History    Marital status: Single   Tobacco Use    Smoking status: Never    Smokeless tobacco: Never   Substance and Sexual Activity    Alcohol use: No    Sexual activity: Never       Current Outpatient Medications   Medication Sig Dispense Refill    cannabidioL (EPIDIOLEX) 100 mg/mL Take 4 ml by mouth twice a day for seizures. 240 mL 5    chlorproMAZINE (THORAZINE) 25 MG tablet Take 1 tablet (25 mg total) by mouth 2 (two) times daily as needed (hiccups). 15 tablet 3    clonazePAM (KLONOPIN) 0.5 MG tablet Take 1 tablet (0.5 mg total) by mouth every evening. 30 tablet 5    diazePAM (VALIUM) 10 MG Tab TAKE 1 TABLET BY MOUTH THREE TIMES DAILY 90 tablet 5    lamoTRIgine (LAMICTAL) 200 MG tablet Take 1 tablet (200 mg total) by mouth 2 (two) times daily. Take one tablet twice a day. 60 tablet 5    PHENobarbitaL 64.8 MG tablet TAKE 3 TABLETS BY MOUTH EVERY DAY AT BEDTIME 90 tablet 5    topiramate (TOPAMAX) 200 MG Tab Take 1 tablet (200 mg total) by mouth 2 (two) times daily. 60 tablet 5     No current facility-administered medications for this visit.       Review of patient's allergies indicates:  No Known Allergies        Review of Systems  non verbal  Objective:      Neurologic Exam  Physical Exam  limited. Patient observed seated on sofa eating lunch. Mother next to him.      Assessment:       1. Seizure disorder        2 Developmental delay       3. Intractable hiccups        Plan:   Discussed risks and benefits of potential treatment options as well as potential side  effects of medications.  Patient was counseled to continue current medications. Seizure precautions were discussed.  Medication compliance discussed. Instructed to call clinic if concerned or to ED if emergency.    Seizure disorder  -     clonazePAM (KLONOPIN) 0.5 MG tablet; Take 1 tablet (0.5 mg total) by mouth every evening.  Dispense: 30 tablet; Refill: 0  -     diazePAM (VALIUM) 10 MG Tab; One three times daily  Dispense: 90 tablet; Refill: 0  -     lamoTRIgine (LAMICTAL) 200 MG tablet; Take 1 tablet (200 mg total) by mouth 2 (two) times daily. Take one tablet twice a day.  Dispense: 60 tablet; Refill: 0  -     PHENobarbitaL (LUMINAL) 64.8 MG tablet; TAKE 3 TABLETS BY MOUTH ONCE DAILY AT BEDTIME. NEEDS APPOINTMENT  Dispense: 90 tablet; Refill: 0  -     topiramate (TOPAMAX) 200 MG Tab; Take 1 tablet (200 mg total) by mouth 2 (two) times daily.  Dispense: 60 tablet; Refill: 0  Continue Epidiolex. No refills needed at this time.    Intractable hiccups  -     chlorproMAZINE (THORAZINE) 25 MG tablet; Take 1 tablet (25 mg total) by mouth 2 (two) times daily as needed (hiccups).  Dispense: 15 tablet; Refill: 0  Mother will use as needed. Not needed lately.    Developmental delay, intellectual disability.  Mother is main caregiver. Lamont doing well.    Asha Wang, PA

## 2022-10-20 ENCOUNTER — TELEPHONE (OUTPATIENT)
Dept: NEUROLOGY | Facility: CLINIC | Age: 27
End: 2022-10-20
Payer: COMMERCIAL

## 2022-10-21 ENCOUNTER — TELEPHONE (OUTPATIENT)
Dept: NEUROLOGY | Facility: CLINIC | Age: 27
End: 2022-10-21
Payer: COMMERCIAL

## 2022-10-21 NOTE — TELEPHONE ENCOUNTER
Approved  10/20/2022  4:43 PM  Case ID: [id number] Appeal supported: No   Note from payer: Request Reference Number: [id number]. DIAZEPAM TAB 10MG is approved through 02/20/2023.     Diazepam 10 mg- Approved

## 2022-10-21 NOTE — TELEPHONE ENCOUNTER
----- Message from Mila Myles MA sent at 10/20/2022  1:55 PM CDT -----  Contact: ina/mother BRIAN Pending  ----- Message -----  From: Emani Langley  Sent: 10/20/2022  12:49 PM CDT  To: Felipe BEAUCHAMP Staff    Lamont Villarreal  MRN: [id number]  : 1995  PCP: Hollis Thomas  Home Phone      [phone]  Work Phone      Not on file.  Mobile          [phone]      MESSAGE: Refill on VALIUM  Pharmacy [facility]      Phone [phone]

## 2022-10-24 ENCOUNTER — SPECIALTY PHARMACY (OUTPATIENT)
Dept: PHARMACY | Facility: CLINIC | Age: 27
End: 2022-10-24
Payer: COMMERCIAL

## 2022-10-24 NOTE — TELEPHONE ENCOUNTER
Specialty Pharmacy - Refill Coordination    Specialty Medication Orders Linked to Encounter      Flowsheet Row Most Recent Value   Medication #1 cannabidioL (EPIDIOLEX) 100 mg/mL (Order#[id number], Rx#[id number])            Refill Questions - Documented Responses      Flowsheet Row Most Recent Value   Patient Availability and HIPAA Verification    Does patient want to proceed with activity? Yes   HIPAA/medical authority confirmed? Yes   Relationship to patient of person spoken to? Mother   Refill Screening Questions    Changes to allergies? No   Changes to medications? No   New conditions since last clinic visit? No   Unplanned office visit, urgent care, ED, or hospital admission in the last 4 weeks? No   How does patient/caregiver feel medication is working? Good   Financial problems or insurance changes? No   How many doses of your specialty medications were missed in the last 4 weeks? 0   Would patient like to speak to a pharmacist? No   When does the patient need to receive the medication? 10/27/22   Refill Delivery Questions    How will the patient receive the medication? Mail   When does the patient need to receive the medication? 10/27/22   Shipping Address Temporary   Address in [facility] confirmed and updated if neccessary? Yes   Expected Copay ($) 0   Is the patient able to afford the medication copay? Yes   Payment Method zero copay   Days supply of Refill 30   Supplies needed? No supplies needed   Refill activity completed? Yes   Refill activity plan Refill scheduled   Shipment/Pickup Date: 10/25/22            Current Outpatient Medications   Medication Sig    cannabidioL (EPIDIOLEX) 100 mg/mL Take 4 ml by mouth twice a day for seizures.    chlorproMAZINE (THORAZINE) 25 MG tablet Take 1 tablet (25 mg total) by mouth 2 (two) times daily as needed (hiccups).    clonazePAM (KLONOPIN) 0.5 MG tablet Take 1 tablet (0.5 mg total) by mouth every evening.    diazePAM (VALIUM) 10 MG Tab TAKE 1 TABLET BY  MOUTH THREE TIMES DAILY    lamoTRIgine (LAMICTAL) 200 MG tablet Take 1 tablet (200 mg total) by mouth 2 (two) times daily. Take one tablet twice a day.    PHENobarbitaL 64.8 MG tablet TAKE 3 TABLETS BY MOUTH EVERY DAY AT BEDTIME    topiramate (TOPAMAX) 200 MG Tab Take 1 tablet (200 mg total) by mouth 2 (two) times daily.   Last reviewed on 10/11/2022 11:39 AM by BRIAN Morrison    Review of patient's allergies indicates:  No Known Allergies Last reviewed on  10/11/2022 11:38 AM by Asha Wang      Tasks added this encounter   11/19/2022 - Refill Call (Auto Added)   Tasks due within next 3 months   No tasks due.     Gill Bales, PharmD  [facility]  [address] [place] [address]  Phone: [phone]  Fax: [phone]

## 2022-11-21 ENCOUNTER — SPECIALTY PHARMACY (OUTPATIENT)
Dept: PHARMACY | Facility: CLINIC | Age: 27
End: 2022-11-21
Payer: MEDICAID

## 2022-11-21 NOTE — TELEPHONE ENCOUNTER
Specialty Pharmacy - Refill Coordination    Specialty Medication Orders Linked to Encounter      Flowsheet Row Most Recent Value   Medication #1 cannabidioL (EPIDIOLEX) 100 mg/mL (Order#[id number], Rx#[id number])            Refill Questions - Documented Responses      Flowsheet Row Most Recent Value   Patient Availability and HIPAA Verification    Does patient want to proceed with activity? Yes   HIPAA/medical authority confirmed? Yes   Relationship to patient of person spoken to? Mother   Refill Screening Questions    Changes to allergies? No   Changes to medications? No   New conditions since last clinic visit? No   Unplanned office visit, urgent care, ED, or hospital admission in the last 4 weeks? No   How does patient/caregiver feel medication is working? Good   Financial problems or insurance changes? No   How many doses of your specialty medications were missed in the last 4 weeks? 0   Would patient like to speak to a pharmacist? No   When does the patient need to receive the medication? 11/25/22   Refill Delivery Questions    How will the patient receive the medication? Mail   When does the patient need to receive the medication? 11/25/22   Shipping Address Temporary   Address in [facility] confirmed and updated if neccessary? Yes   Expected Copay ($) 0   Is the patient able to afford the medication copay? Yes   Payment Method zero copay   Days supply of Refill 30   Supplies needed? No supplies needed   Refill activity completed? Yes   Refill activity plan Refill scheduled   Shipment/Pickup Date: 11/22/22            Current Outpatient Medications   Medication Sig    cannabidioL (EPIDIOLEX) 100 mg/mL Take 4 ml by mouth twice a day for seizures.    chlorproMAZINE (THORAZINE) 25 MG tablet Take 1 tablet (25 mg total) by mouth 2 (two) times daily as needed (hiccups).    clonazePAM (KLONOPIN) 0.5 MG tablet Take 1 tablet (0.5 mg total) by mouth every evening.    diazePAM (VALIUM) 10 MG Tab TAKE 1 TABLET BY  MOUTH THREE TIMES DAILY    lamoTRIgine (LAMICTAL) 200 MG tablet Take 1 tablet (200 mg total) by mouth 2 (two) times daily. Take one tablet twice a day.    PHENobarbitaL 64.8 MG tablet TAKE 3 TABLETS BY MOUTH EVERY DAY AT BEDTIME    topiramate (TOPAMAX) 200 MG Tab Take 1 tablet (200 mg total) by mouth 2 (two) times daily.   Last reviewed on 10/11/2022 11:39 AM by BRIAN Morrison    Review of patient's allergies indicates:  No Known Allergies Last reviewed on  10/11/2022 11:38 AM by Asha Wang      Tasks added this encounter   12/18/2022 - Refill Call (Auto Added)   Tasks due within next 3 months   No tasks due.     Gill Bales, PharmD  [facility]  [address] [place] [id number]  Phone: [phone]  Fax: [phone]

## 2022-12-19 ENCOUNTER — SPECIALTY PHARMACY (OUTPATIENT)
Dept: PHARMACY | Facility: CLINIC | Age: 27
End: 2022-12-19
Payer: MEDICAID

## 2022-12-19 NOTE — TELEPHONE ENCOUNTER
Specialty Pharmacy - Refill Coordination    Specialty Medication Orders Linked to Encounter      Flowsheet Row Most Recent Value   Medication #1 cannabidioL (EPIDIOLEX) 100 mg/mL (Order#[id number], Rx#[id number])            Refill Questions - Documented Responses      Flowsheet Row Most Recent Value   Patient Availability and HIPAA Verification    Does patient want to proceed with activity? Yes   HIPAA/medical authority confirmed? Yes   Relationship to patient of person spoken to? Mother   Refill Screening Questions    Changes to allergies? No   Changes to medications? No   New conditions since last clinic visit? No   Unplanned office visit, urgent care, ED, or hospital admission in the last 4 weeks? No   How does patient/caregiver feel medication is working? Very good   Financial problems or insurance changes? No   How many doses of your specialty medications were missed in the last 4 weeks? 0   Would patient like to speak to a pharmacist? No   When does the patient need to receive the medication? 12/23/22   Refill Delivery Questions    How will the patient receive the medication? MEDRx   When does the patient need to receive the medication? 12/23/22   Shipping Address Temporary   Address in [facility] confirmed and updated if neccessary? Yes   Expected Copay ($) 0   Is the patient able to afford the medication copay? Yes   Payment Method zero copay   Days supply of Refill 30   Supplies needed? No supplies needed   Refill activity completed? Yes   Refill activity plan Refill scheduled   Shipment/Pickup Date: 12/20/22            Current Outpatient Medications   Medication Sig    cannabidioL (EPIDIOLEX) 100 mg/mL Take 4 ml by mouth twice a day for seizures.    chlorproMAZINE (THORAZINE) 25 MG tablet Take 1 tablet (25 mg total) by mouth 2 (two) times daily as needed (hiccups).    clonazePAM (KLONOPIN) 0.5 MG tablet Take 1 tablet (0.5 mg total) by mouth every evening.    diazePAM (VALIUM) 10 MG Tab TAKE 1 TABLET  BY MOUTH THREE TIMES DAILY    lamoTRIgine (LAMICTAL) 200 MG tablet Take 1 tablet (200 mg total) by mouth 2 (two) times daily. Take one tablet twice a day.    PHENobarbitaL 64.8 MG tablet TAKE 3 TABLETS BY MOUTH EVERY DAY AT BEDTIME    topiramate (TOPAMAX) 200 MG Tab Take 1 tablet (200 mg total) by mouth 2 (two) times daily.   Last reviewed on 10/11/2022 11:39 AM by BRIAN Morrison    Review of patient's allergies indicates:  No Known Allergies Last reviewed on  10/11/2022 11:38 AM by Asha Wang      Tasks added this encounter   1/15/2023 - Refill Call (Auto Added)   Tasks due within next 3 months   No tasks due.     Gill Bales, PharmD  [facility]  [address] [place] [address]  Phone: [phone]  Fax: [phone]

## 2023-01-17 ENCOUNTER — SPECIALTY PHARMACY (OUTPATIENT)
Dept: PHARMACY | Facility: CLINIC | Age: 28
End: 2023-01-17
Payer: MEDICAID

## 2023-01-17 NOTE — TELEPHONE ENCOUNTER
Specialty Pharmacy - Refill Coordination    Specialty Medication Orders Linked to Encounter      Flowsheet Row Most Recent Value   Medication #1 cannabidioL (EPIDIOLEX) 100 mg/mL (Order#[id number], Rx#[id number])            Refill Questions - Documented Responses      Flowsheet Row Most Recent Value   Patient Availability and HIPAA Verification    Does patient want to proceed with activity? Yes   HIPAA/medical authority confirmed? Yes   Relationship to patient of person spoken to? Mother   Refill Screening Questions    Changes to allergies? No   Changes to medications? No   New conditions since last clinic visit? No   Unplanned office visit, urgent care, ED, or hospital admission in the last 4 weeks? No   How does patient/caregiver feel medication is working? Very good   Financial problems or insurance changes? No   How many doses of your specialty medications were missed in the last 4 weeks? 0   Would patient like to speak to a pharmacist? No   When does the patient need to receive the medication? 01/22/23   Refill Delivery Questions    How will the patient receive the medication? Mail   When does the patient need to receive the medication? 01/22/23   Shipping Address Temporary   Address in [facility] confirmed and updated if neccessary? Yes   Expected Copay ($) 0   Is the patient able to afford the medication copay? Yes   Payment Method zero copay   Days supply of Refill 30   Supplies needed? No supplies needed   Refill activity completed? Yes   Refill activity plan Refill scheduled   Shipment/Pickup Date: 01/18/23            Current Outpatient Medications   Medication Sig    cannabidioL (EPIDIOLEX) 100 mg/mL Take 4 ml by mouth twice a day for seizures.    chlorproMAZINE (THORAZINE) 25 MG tablet Take 1 tablet (25 mg total) by mouth 2 (two) times daily as needed (hiccups).    clonazePAM (KLONOPIN) 0.5 MG tablet Take 1 tablet (0.5 mg total) by mouth every evening.    diazePAM (VALIUM) 10 MG Tab TAKE 1 TABLET  BY MOUTH THREE TIMES DAILY    lamoTRIgine (LAMICTAL) 200 MG tablet Take 1 tablet (200 mg total) by mouth 2 (two) times daily. Take one tablet twice a day.    PHENobarbitaL 64.8 MG tablet TAKE 3 TABLETS BY MOUTH EVERY DAY AT BEDTIME    topiramate (TOPAMAX) 200 MG Tab Take 1 tablet (200 mg total) by mouth 2 (two) times daily.   Last reviewed on 10/11/2022 11:39 AM by BRIAN Morrison    Review of patient's allergies indicates:  No Known Allergies Last reviewed on  10/11/2022 11:38 AM by Asha Wang      Tasks added this encounter   2/14/2023 - Refill Call (Auto Added)   Tasks due within next 3 months   No tasks due.     Gill Bales, PharmD  [facility]  [address] [place] [id number]  Phone: [phone]  Fax: [phone]

## 2023-02-14 ENCOUNTER — SPECIALTY PHARMACY (OUTPATIENT)
Dept: PHARMACY | Facility: CLINIC | Age: 28
End: 2023-02-14
Payer: MEDICAID

## 2023-02-14 NOTE — TELEPHONE ENCOUNTER
Specialty Pharmacy - Refill Coordination    Specialty Medication Orders Linked to Encounter      Flowsheet Row Most Recent Value   Medication #1 cannabidioL (EPIDIOLEX) 100 mg/mL (Order#[id number], Rx#[id number])            Refill Questions - Documented Responses      Flowsheet Row Most Recent Value   Patient Availability and HIPAA Verification    Does patient want to proceed with activity? Yes   HIPAA/medical authority confirmed? Yes   Relationship to patient of person spoken to? Mother   Refill Screening Questions    Changes to allergies? No   Changes to medications? No   New conditions since last clinic visit? No   Unplanned office visit, urgent care, ED, or hospital admission in the last 4 weeks? No   How does patient/caregiver feel medication is working? Excellent   Financial problems or insurance changes? No   How many doses of your specialty medications were missed in the last 4 weeks? 0   Would patient like to speak to a pharmacist? No   When does the patient need to receive the medication? 02/17/23   Refill Delivery Questions    How will the patient receive the medication? Mail   When does the patient need to receive the medication? 02/17/23   Shipping Address Temporary   Address in [facility] confirmed and updated if neccessary? Yes   Expected Copay ($) 0   Is the patient able to afford the medication copay? Yes   Payment Method zero copay   Days supply of Refill 30   Supplies needed? No supplies needed   Refill activity completed? Yes   Refill activity plan Refill scheduled   Shipment/Pickup Date: 02/15/23            Current Outpatient Medications   Medication Sig    cannabidioL (EPIDIOLEX) 100 mg/mL Take 4 ml by mouth twice a day for seizures.    chlorproMAZINE (THORAZINE) 25 MG tablet Take 1 tablet (25 mg total) by mouth 2 (two) times daily as needed (hiccups).    clonazePAM (KLONOPIN) 0.5 MG tablet Take 1 tablet (0.5 mg total) by mouth every evening.    diazePAM (VALIUM) 10 MG Tab TAKE 1 TABLET  BY MOUTH THREE TIMES DAILY    lamoTRIgine (LAMICTAL) 200 MG tablet Take 1 tablet (200 mg total) by mouth 2 (two) times daily. Take one tablet twice a day.    PHENobarbitaL 64.8 MG tablet TAKE 3 TABLETS BY MOUTH EVERY DAY AT BEDTIME    topiramate (TOPAMAX) 200 MG Tab Take 1 tablet (200 mg total) by mouth 2 (two) times daily.   Last reviewed on 10/11/2022 11:39 AM by BRIAN Morrison    Review of patient's allergies indicates:  No Known Allergies Last reviewed on  10/11/2022 11:38 AM by Asha Wang      Tasks added this encounter   3/12/2023 - Refill Call (Auto Added)   Tasks due within next 3 months   No tasks due.     Gill Bales, PharmD  [facility]  [address] [place] [id number]  Phone: [phone]  Fax: [phone]

## 2023-03-13 DIAGNOSIS — G40.909 SEIZURE DISORDER: ICD-10-CM

## 2023-03-13 DIAGNOSIS — G40.814 LENNOX-GASTAUT SYNDROME, INTRACTABLE, WITHOUT STATUS EPILEPTICUS: ICD-10-CM

## 2023-03-14 RX ORDER — CANNABIDIOL 100 MG/ML
SOLUTION ORAL
Qty: 240 ML | Refills: 5 | Status: SHIPPED | OUTPATIENT
Start: 2023-03-14 | End: 2023-08-23 | Stop reason: SDUPTHER

## 2023-03-16 ENCOUNTER — SPECIALTY PHARMACY (OUTPATIENT)
Dept: PHARMACY | Facility: CLINIC | Age: 28
End: 2023-03-16
Payer: MEDICAID

## 2023-03-16 NOTE — TELEPHONE ENCOUNTER
Specialty Pharmacy - Refill Coordination    Specialty Medication Orders Linked to Encounter      Flowsheet Row Most Recent Value   Medication #1 cannabidioL (EPIDIOLEX) 100 mg/mL (Order#[id number], Rx#[id number])            Refill Questions - Documented Responses      Flowsheet Row Most Recent Value   Patient Availability and HIPAA Verification    Does patient want to proceed with activity? Yes   HIPAA/medical authority confirmed? Yes   Relationship to patient of person spoken to? Mother   Refill Screening Questions    Changes to allergies? No   Changes to medications? No   New conditions since last clinic visit? No   Unplanned office visit, urgent care, ED, or hospital admission in the last 4 weeks? No   How does patient/caregiver feel medication is working? Good   Financial problems or insurance changes? No   How many doses of your specialty medications were missed in the last 4 weeks? 0   Would patient like to speak to a pharmacist? No   When does the patient need to receive the medication? 03/19/23   Refill Delivery Questions    How will the patient receive the medication? Mail   When does the patient need to receive the medication? 03/19/23   Shipping Address Temporary   Address in [facility] confirmed and updated if neccessary? Yes   Expected Copay ($) 0   Is the patient able to afford the medication copay? Yes   Payment Method zero copay   Days supply of Refill 30   Supplies needed? No supplies needed   Refill activity completed? Yes   Refill activity plan Refill scheduled   Shipment/Pickup Date: 03/16/23            Current Outpatient Medications   Medication Sig    cannabidioL (EPIDIOLEX) 100 mg/mL Take 4 ml by mouth twice a day for seizures.    chlorproMAZINE (THORAZINE) 25 MG tablet Take 1 tablet (25 mg total) by mouth 2 (two) times daily as needed (hiccups).    clonazePAM (KLONOPIN) 0.5 MG tablet Take 1 tablet (0.5 mg total) by mouth every evening.    diazePAM (VALIUM) 10 MG Tab TAKE 1 TABLET BY  MOUTH THREE TIMES DAILY    lamoTRIgine (LAMICTAL) 200 MG tablet Take 1 tablet (200 mg total) by mouth 2 (two) times daily. Take one tablet twice a day.    PHENobarbitaL 64.8 MG tablet TAKE 3 TABLETS BY MOUTH EVERY DAY AT BEDTIME    topiramate (TOPAMAX) 200 MG Tab Take 1 tablet (200 mg total) by mouth 2 (two) times daily.   Last reviewed on 10/11/2022 11:39 AM by BRIAN Morrison    Review of patient's allergies indicates:  No Known Allergies Last reviewed on  10/11/2022 11:38 AM by Asha Wang      Tasks added this encounter   4/11/2023 - Refill Call (Auto Added)   Tasks due within next 3 months   No tasks due.     Gill Bales, PharmD  [facility]  [address] [place] [address]  Phone: [phone]  Fax: [phone]

## 2023-03-22 ENCOUNTER — TELEPHONE (OUTPATIENT)
Dept: NEUROLOGY | Facility: CLINIC | Age: 28
End: 2023-03-22
Payer: MEDICAID

## 2023-03-22 NOTE — TELEPHONE ENCOUNTER
Patient's mother, Nolberto, called stating she could not get patient his KLONOPIN refills because they are backordered at every pharmacy and wanted to know what else we could send in for him to take instead.  Per BRIAN Dos Santos there is nothing else we could send this patient because he already takes Valium TID and was taking Klonopin 0.5 only at night.  She wants to see how this patient does off of the Klonopin.  Mother aware of instructions and v/u.

## 2023-04-10 DIAGNOSIS — G40.909 SEIZURE DISORDER: ICD-10-CM

## 2023-04-10 NOTE — TELEPHONE ENCOUNTER
----- Message from Sahra Villanueva sent at 4/10/2023  1:13 PM CDT -----  Contact: MOTHER  Lamont Villarreal  MRN: [id number]  : 1995  PCP: Hollis Thomas  Home Phone      [phone]  Work Phone      Not on file.  Mobile          [phone]      MESSAGE: Mother states that the last time that the Diazepam was filled it needed a prior authorization and because the prior authorization was gotten too late she had to pay for the medication.  She states that he will be needing a refill on 04/15/23 and would like the prior authorization started.        Phone: [phone]

## 2023-04-11 ENCOUNTER — SPECIALTY PHARMACY (OUTPATIENT)
Dept: PHARMACY | Facility: CLINIC | Age: 28
End: 2023-04-11
Payer: MEDICAID

## 2023-04-11 RX ORDER — DIAZEPAM 10 MG/1
TABLET ORAL
Qty: 90 TABLET | Refills: 5 | Status: SHIPPED | OUTPATIENT
Start: 2023-04-11 | End: 2023-09-25 | Stop reason: SDUPTHER

## 2023-04-11 NOTE — TELEPHONE ENCOUNTER
Specialty Pharmacy - Refill Coordination    Specialty Medication Orders Linked to Encounter      Flowsheet Row Most Recent Value   Medication #1 cannabidioL (EPIDIOLEX) 100 mg/mL (Order#[id number], Rx#[id number])            Refill Questions - Documented Responses      Flowsheet Row Most Recent Value   Patient Availability and HIPAA Verification    Does patient want to proceed with activity? Yes   HIPAA/medical authority confirmed? Yes   Relationship to patient of person spoken to? Mother   Refill Screening Questions    Changes to allergies? No   Changes to medications? No   New conditions since last clinic visit? No   Unplanned office visit, urgent care, ED, or hospital admission in the last 4 weeks? No   How does patient/caregiver feel medication is working? Good   Financial problems or insurance changes? No   How many doses of your specialty medications were missed in the last 4 weeks? 0   Would patient like to speak to a pharmacist? No   When does the patient need to receive the medication? 04/16/23   Refill Delivery Questions    How will the patient receive the medication? MEDRx   When does the patient need to receive the medication? 04/16/23   Shipping Address Temporary   Address in [facility] confirmed and updated if neccessary? Yes   Expected Copay ($) 0   Is the patient able to afford the medication copay? Yes   Payment Method zero copay   Days supply of Refill 30   Supplies needed? No supplies needed   Refill activity completed? Yes   Refill activity plan Refill scheduled   Shipment/Pickup Date: 04/13/23            Current Outpatient Medications   Medication Sig    cannabidioL (EPIDIOLEX) 100 mg/mL Take 4 ml by mouth twice a day for seizures.    chlorproMAZINE (THORAZINE) 25 MG tablet Take 1 tablet (25 mg total) by mouth 2 (two) times daily as needed (hiccups).    clonazePAM (KLONOPIN) 0.5 MG tablet Take 1 tablet (0.5 mg total) by mouth every evening.    diazePAM (VALIUM) 10 MG Tab TAKE 1 TABLET BY  MOUTH THREE TIMES DAILY    lamoTRIgine (LAMICTAL) 200 MG tablet Take 1 tablet (200 mg total) by mouth 2 (two) times daily. Take one tablet twice a day.    PHENobarbitaL 64.8 MG tablet TAKE 3 TABLETS BY MOUTH EVERY DAY AT BEDTIME    topiramate (TOPAMAX) 200 MG Tab Take 1 tablet (200 mg total) by mouth 2 (two) times daily.   Last reviewed on 10/11/2022 11:39 AM by BRIAN Morrison    Review of patient's allergies indicates:  No Known Allergies Last reviewed on  10/11/2022 11:38 AM by Asha Wang      Tasks added this encounter   5/9/2023 - Refill Call (Auto Added)   Tasks due within next 3 months   No tasks due.     Brina Lundy, PharmD  [facility]  [address] [place] [address]  Phone: [phone]  Fax: [phone]

## 2023-05-02 ENCOUNTER — OFFICE VISIT (OUTPATIENT)
Dept: NEUROLOGY | Facility: CLINIC | Age: 28
End: 2023-05-02
Payer: COMMERCIAL

## 2023-05-02 DIAGNOSIS — R25.2 SPASTICITY: ICD-10-CM

## 2023-05-02 DIAGNOSIS — R62.50 DEVELOPMENTAL DELAY: Primary | ICD-10-CM

## 2023-05-02 DIAGNOSIS — G40.909 SEIZURE DISORDER: ICD-10-CM

## 2023-05-02 PROCEDURE — 1160F RVW MEDS BY RX/DR IN RCRD: CPT | Mod: CPTII,95,, | Performed by: PHYSICIAN ASSISTANT

## 2023-05-02 PROCEDURE — 1159F PR MEDICATION LIST DOCUMENTED IN MEDICAL RECORD: ICD-10-PCS | Mod: CPTII,95,, | Performed by: PHYSICIAN ASSISTANT

## 2023-05-02 PROCEDURE — 1159F MED LIST DOCD IN RCRD: CPT | Mod: CPTII,95,, | Performed by: PHYSICIAN ASSISTANT

## 2023-05-02 PROCEDURE — 1160F PR REVIEW ALL MEDS BY PRESCRIBER/CLIN PHARMACIST DOCUMENTED: ICD-10-PCS | Mod: CPTII,95,, | Performed by: PHYSICIAN ASSISTANT

## 2023-05-02 PROCEDURE — 99213 OFFICE O/P EST LOW 20 MIN: CPT | Mod: 95,,, | Performed by: PHYSICIAN ASSISTANT

## 2023-05-02 PROCEDURE — 99213 PR OFFICE/OUTPT VISIT, EST, LEVL III, 20-29 MIN: ICD-10-PCS | Mod: 95,,, | Performed by: PHYSICIAN ASSISTANT

## 2023-05-02 RX ORDER — TOPIRAMATE 200 MG/1
200 TABLET ORAL 2 TIMES DAILY
Qty: 60 TABLET | Refills: 5 | Status: SHIPPED | OUTPATIENT
Start: 2023-05-02 | End: 2023-11-07

## 2023-05-02 RX ORDER — LAMOTRIGINE 200 MG/1
200 TABLET ORAL 2 TIMES DAILY
Qty: 60 TABLET | Refills: 5 | Status: SHIPPED | OUTPATIENT
Start: 2023-05-02 | End: 2023-11-07

## 2023-05-02 RX ORDER — PHENOBARBITAL 64.8 MG/1
TABLET ORAL
Qty: 90 TABLET | Refills: 5 | Status: SHIPPED | OUTPATIENT
Start: 2023-05-02 | End: 2023-09-25 | Stop reason: SDUPTHER

## 2023-05-02 NOTE — PROGRESS NOTES
Subjective:    The patient location is: home with his mother in [place], [place]  The chief complaint leading to consultation is: seizure follow up    Visit type: audiovisual    Face to Face time with patient: 10  25 minutes of total time spent on the encounter, which includes face to face time and non-face to face time preparing to see the patient (eg, review of tests), Obtaining and/or reviewing separately obtained history, Documenting clinical information in the electronic or other health record, Independently interpreting results (not separately reported) and communicating results to the patient/family/caregiver, or Care coordination (not separately reported).         Each patient to whom he or she provides medical services by telemedicine is:  (1) informed of the relationship between the physician and patient and the respective role of any other health care provider with respect to management of the patient; and (2) notified that he or she may decline to receive medical services by telemedicine and may withdraw from such care at any time.    Notes: le   Patient ID: Lamont Villarreal is a 28 y.o. male.    Chief Complaint: No chief complaint on file.      HPI:  Lamont Villarreal is a 28 y.o. developmentally delayed male is here for follow up visit regarding epilepsy. Medications and tolerability were reviewed. Patient states compliance with seizure medications.  Seizure free for the past year. This is the best seizure control he has had. No medication changes in the past year except for stopping clonazepam a few months ago since it was unavailable.    Weight is stable. He feeds himself. Appetite normal for him. He sleeps often, but he is mostly up. Mother feels he has been doing well with the exception of developing some contractures.  Since they have been living in a camper (still not back in home from [place].)he has been less active. Over the past month, she has noticed hands and knees more flexed, and she is no longer able  to fully extend. He has been walking with knees bent. He seems to be having limited range of motion and pain in the right shoulder. Mother notices when she bathes him. Similar symptoms in left a few years ago which has resolved.    Seizure meds include Topamax, phenobarbital, Lamictal and diazepam. He is also taking Epidioloex which has really been effective for him.    Past Medical History:   Diagnosis Date    Developmental delay     Mental disorder     Seizures        No past surgical history on file.    No family history on file.    Social History     Socioeconomic History    Marital status: Single   Tobacco Use    Smoking status: Never    Smokeless tobacco: Never   Substance and Sexual Activity    Alcohol use: No    Sexual activity: Never       Current Outpatient Medications   Medication Sig Dispense Refill    cannabidioL (EPIDIOLEX) 100 mg/mL Take 4 ml by mouth twice a day for seizures. 240 mL 5    chlorproMAZINE (THORAZINE) 25 MG tablet Take 1 tablet (25 mg total) by mouth 2 (two) times daily as needed (hiccups). 15 tablet 3    diazePAM (VALIUM) 10 MG Tab TAKE 1 TABLET BY MOUTH THREE TIMES DAILY 90 tablet 5    lamoTRIgine (LAMICTAL) 200 MG tablet Take 1 tablet (200 mg total) by mouth 2 (two) times daily. 60 tablet 5    PHENobarbitaL 64.8 MG tablet 3 tablets at bedtime for seizures 90 tablet 5    topiramate (TOPAMAX) 200 MG Tab Take 1 tablet (200 mg total) by mouth 2 (two) times daily. 60 tablet 5     No current facility-administered medications for this visit.       Review of patient's allergies indicates:  No Known Allergies        Review of Systems  non verbal  Objective:      Neurologic Exam  Physical Exam  seated on sofa next to mother. Awake    Flexion contractures bilateral hands, right worse than left. Mother can passively extend left fingers to near full extension. Not on Right.    Assessment:       1. Developmental delay    2. Seizure disorder    3. Spasticity        Plan:   Discussed risks and  benefits of potential treatment options as well as potential side effects of medications.  Patient was counseled to continue current medications. Seizure precautions were discussed. Specifically discussed driving restrictions per [place] law. Medication compliance discussed. Instructed to call clinic if concerned or to ED if emergency.    Developmental delay  -     Ambulatory referral/consult to Physical/Occupational Therapy; Future; Expected date: 05/09/2023    Seizure disorder  -     topiramate (TOPAMAX) 200 MG Tab; Take 1 tablet (200 mg total) by mouth 2 (two) times daily.  Dispense: 60 tablet; Refill: 5  -     PHENobarbitaL 64.8 MG tablet; 3 tablets at bedtime for seizures  Dispense: 90 tablet; Refill: 5  -     lamoTRIgine (LAMICTAL) 200 MG tablet; Take 1 tablet (200 mg total) by mouth 2 (two) times daily.  Dispense: 60 tablet; Refill: 5  -     Ambulatory referral/consult to Physical/Occupational Therapy; Future; Expected date: 05/09/2023  Continue diazepam and Epidiolex as currently taking.  Seizure free for over a year. Best he has ever done.  Off clonazepam for a few months without any changes. Will keep him off.    Spasticity  -     Ambulatory referral/consult to Physical/Occupational Therapy; Future; Expected date: 05/09/2023        BRIAN Cortez

## 2023-05-03 ENCOUNTER — TELEPHONE (OUTPATIENT)
Dept: NEUROLOGY | Facility: CLINIC | Age: 28
End: 2023-05-03
Payer: MEDICAID

## 2023-05-03 NOTE — TELEPHONE ENCOUNTER
----- Message from BRIAN Morrison sent at 5/3/2023  9:27 AM CDT -----  Contact: MARÍA CUEVAS  Yes, this is OK  ----- Message -----  From: Mila Myles MA  Sent: 5/3/2023   8:17 AM CDT  To: BRIAN Morrison      ----- Message -----  From: Sahra Villanueva  Sent: 5/3/2023   8:15 AM CDT  To: Felipe Adame    Lamont Villarreal  MRN: [id number]  : 1995  PCP: Hollis Thomas  Home Phone      [phone]  Work Phone      Not on file.  Mobile          [phone]      MESSAGE: María states that she received a referral for the patient to have PT & OT she states that they do not have a OT therapist but their physical therapist specializes in occupational therapy and would like to know if this would be ok with Asha.        Phone: [phone]

## 2023-05-09 ENCOUNTER — SPECIALTY PHARMACY (OUTPATIENT)
Dept: PHARMACY | Facility: CLINIC | Age: 28
End: 2023-05-09
Payer: MEDICAID

## 2023-05-09 NOTE — TELEPHONE ENCOUNTER
Specialty Pharmacy - Refill Coordination    Specialty Medication Orders Linked to Encounter      Flowsheet Row Most Recent Value   Medication #1 cannabidioL (EPIDIOLEX) 100 mg/mL (Order#[id number], Rx#[id number])            Refill Questions - Documented Responses      Flowsheet Row Most Recent Value   Patient Availability and HIPAA Verification    Does patient want to proceed with activity? Yes   HIPAA/medical authority confirmed? Yes   Relationship to patient of person spoken to? Mother   Refill Screening Questions    Changes to allergies? No   Changes to medications? No   New conditions since last clinic visit? No   Unplanned office visit, urgent care, ED, or hospital admission in the last 4 weeks? No   How does patient/caregiver feel medication is working? Good   Financial problems or insurance changes? No   How many doses of your specialty medications were missed in the last 4 weeks? 0   Would patient like to speak to a pharmacist? No   When does the patient need to receive the medication? 05/11/23   Refill Delivery Questions    How will the patient receive the medication? Mail   When does the patient need to receive the medication? 05/11/23   Shipping Address Temporary   Address in [facility] confirmed and updated if neccessary? Yes   Expected Copay ($) 0   Is the patient able to afford the medication copay? Yes   Payment Method zero copay   Days supply of Refill 30   Supplies needed? No supplies needed   Refill activity completed? Yes   Refill activity plan Refill scheduled   Shipment/Pickup Date: 05/09/23            Current Outpatient Medications   Medication Sig    cannabidioL (EPIDIOLEX) 100 mg/mL Take 4 ml by mouth twice a day for seizures.    chlorproMAZINE (THORAZINE) 25 MG tablet Take 1 tablet (25 mg total) by mouth 2 (two) times daily as needed (hiccups).    diazePAM (VALIUM) 10 MG Tab TAKE 1 TABLET BY MOUTH THREE TIMES DAILY    lamoTRIgine (LAMICTAL) 200 MG tablet Take 1 tablet (200 mg total)  by mouth 2 (two) times daily.    PHENobarbitaL 64.8 MG tablet 3 tablets at bedtime for seizures    topiramate (TOPAMAX) 200 MG Tab Take 1 tablet (200 mg total) by mouth 2 (two) times daily.   Last reviewed on 5/2/2023  4:03 PM by BRIAN Morrison    Review of patient's allergies indicates:  No Known Allergies Last reviewed on  5/2/2023 4:02 PM by Asha Wang      Tasks added this encounter   No tasks added.   Tasks due within next 3 months   5/9/2023 - Refill Coordination Outreach (1 time occurrence)     Gill Bales, PharmD  [facility]  [address] [facility] [address]  Phone: [phone]  Fax: [phone]

## 2023-06-01 ENCOUNTER — SPECIALTY PHARMACY (OUTPATIENT)
Dept: PHARMACY | Facility: CLINIC | Age: 28
End: 2023-06-01
Payer: MEDICAID

## 2023-06-01 NOTE — TELEPHONE ENCOUNTER
Specialty Pharmacy - Refill Coordination    Specialty Medication Orders Linked to Encounter      Flowsheet Row Most Recent Value   Medication #1 cannabidioL (EPIDIOLEX) 100 mg/mL (Order#[id number], Rx#[id number])            Refill Questions - Documented Responses      Flowsheet Row Most Recent Value   Patient Availability and HIPAA Verification    Does patient want to proceed with activity? Yes   HIPAA/medical authority confirmed? Yes   Relationship to patient of person spoken to? Mother   Refill Screening Questions    Changes to allergies? No   Changes to medications? No   New conditions since last clinic visit? No   Unplanned office visit, urgent care, ED, or hospital admission in the last 4 weeks? No   How does patient/caregiver feel medication is working? Good   Financial problems or insurance changes? No   How many doses of your specialty medications were missed in the last 4 weeks? 0   Would patient like to speak to a pharmacist? No   When does the patient need to receive the medication? 06/04/23   Refill Delivery Questions    How will the patient receive the medication? Mail   When does the patient need to receive the medication? 06/04/23   Shipping Address Temporary   Address in [facility] confirmed and updated if neccessary? Yes   Expected Copay ($) 0   Is the patient able to afford the medication copay? Yes   Payment Method zero copay   Days supply of Refill 30   Supplies needed? No supplies needed   Refill activity completed? Yes   Refill activity plan Refill scheduled   Shipment/Pickup Date: 06/01/23            Current Outpatient Medications   Medication Sig    cannabidioL (EPIDIOLEX) 100 mg/mL Take 4 ml by mouth twice a day for seizures.    chlorproMAZINE (THORAZINE) 25 MG tablet Take 1 tablet (25 mg total) by mouth 2 (two) times daily as needed (hiccups).    diazePAM (VALIUM) 10 MG Tab TAKE 1 TABLET BY MOUTH THREE TIMES DAILY    lamoTRIgine (LAMICTAL) 200 MG tablet Take 1 tablet (200 mg total)  by mouth 2 (two) times daily.    PHENobarbitaL 64.8 MG tablet 3 tablets at bedtime for seizures    topiramate (TOPAMAX) 200 MG Tab Take 1 tablet (200 mg total) by mouth 2 (two) times daily.   Last reviewed on 5/2/2023  4:03 PM by BRIAN Morrison    Review of patient's allergies indicates:  No Known Allergies Last reviewed on  5/2/2023 4:02 PM by Asha Wang      Tasks added this encounter   No tasks added.   Tasks due within next 3 months   6/1/2023 - Refill Coordination Outreach (1 time occurrence)     Gill Bales, PharmD  [facility]  [address] [address]  Phone: [phone]  Fax: [phone]

## 2023-06-30 ENCOUNTER — SPECIALTY PHARMACY (OUTPATIENT)
Dept: PHARMACY | Facility: CLINIC | Age: 28
End: 2023-06-30
Payer: MEDICAID

## 2023-06-30 NOTE — TELEPHONE ENCOUNTER
Specialty Pharmacy - Refill Coordination    Specialty Medication Orders Linked to Encounter      Flowsheet Row Most Recent Value   Medication #1 cannabidioL (EPIDIOLEX) 100 mg/mL (Order#[id number], Rx#[id number])            Refill Questions - Documented Responses      Flowsheet Row Most Recent Value   Patient Availability and HIPAA Verification    Does patient want to proceed with activity? Yes   HIPAA/medical authority confirmed? Yes   Relationship to patient of person spoken to? Mother   Refill Screening Questions    Changes to allergies? No   Changes to medications? No   New conditions since last clinic visit? No   Unplanned office visit, urgent care, ED, or hospital admission in the last 4 weeks? No   How does patient/caregiver feel medication is working? Good   Financial problems or insurance changes? No   How many doses of your specialty medications were missed in the last 4 weeks? 0   Would patient like to speak to a pharmacist? No   When does the patient need to receive the medication? 07/06/23   Refill Delivery Questions    How will the patient receive the medication? Mail   When does the patient need to receive the medication? 07/06/23   Shipping Address Temporary   Address in [facility] confirmed and updated if neccessary? Yes   Expected Copay ($) 0   Is the patient able to afford the medication copay? Yes   Payment Method zero copay   Days supply of Refill 30   Supplies needed? No supplies needed   Refill activity completed? Yes   Refill activity plan Refill scheduled   Shipment/Pickup Date: 07/05/23            Current Outpatient Medications   Medication Sig    cannabidioL (EPIDIOLEX) 100 mg/mL Take 4 ml by mouth twice a day for seizures.    chlorproMAZINE (THORAZINE) 25 MG tablet Take 1 tablet (25 mg total) by mouth 2 (two) times daily as needed (hiccups).    diazePAM (VALIUM) 10 MG Tab TAKE 1 TABLET BY MOUTH THREE TIMES DAILY    lamoTRIgine (LAMICTAL) 200 MG tablet Take 1 tablet (200 mg total)  by mouth 2 (two) times daily.    PHENobarbitaL 64.8 MG tablet 3 tablets at bedtime for seizures    topiramate (TOPAMAX) 200 MG Tab Take 1 tablet (200 mg total) by mouth 2 (two) times daily.   Last reviewed on 5/2/2023  4:03 PM by BRIAN Morrison    Review of patient's allergies indicates:  No Known Allergies Last reviewed on  5/2/2023 4:02 PM by Asha Wang      Tasks added this encounter   No tasks added.   Tasks due within next 3 months   6/30/2023 - Refill Coordination Outreach (1 time occurrence)     Gill Bales, PharmD  [facility]  [address] [facility] [address]  Phone: [phone]  Fax: [phone]

## 2023-07-28 ENCOUNTER — SPECIALTY PHARMACY (OUTPATIENT)
Dept: PHARMACY | Facility: CLINIC | Age: 28
End: 2023-07-28
Payer: MEDICAID

## 2023-07-28 NOTE — TELEPHONE ENCOUNTER
Specialty Pharmacy - Refill Coordination    Specialty Medication Orders Linked to Encounter      Flowsheet Row Most Recent Value   Medication #1 cannabidioL (EPIDIOLEX) 100 mg/mL (Order#[id number], Rx#[id number])            Refill Questions - Documented Responses      Flowsheet Row Most Recent Value   Patient Availability and HIPAA Verification    Does patient want to proceed with activity? Yes   HIPAA/medical authority confirmed? Yes   Relationship to patient of person spoken to? Mother   Refill Screening Questions    Changes to allergies? No   Changes to medications? No   New conditions since last clinic visit? No   Unplanned office visit, urgent care, ED, or hospital admission in the last 4 weeks? No   How does patient/caregiver feel medication is working? Good   Financial problems or insurance changes? No   How many doses of your specialty medications were missed in the last 4 weeks? 0   Would patient like to speak to a pharmacist? No   When does the patient need to receive the medication? 08/03/23   Refill Delivery Questions    How will the patient receive the medication? Mail   When does the patient need to receive the medication? 08/03/23   Shipping Address Temporary   Address in [facility] confirmed and updated if neccessary? Yes   Expected Copay ($) 0   Is the patient able to afford the medication copay? Yes   Payment Method zero copay   Days supply of Refill 30   Supplies needed? No supplies needed   Refill activity completed? Yes   Refill activity plan Refill scheduled   Shipment/Pickup Date: 07/31/23            Current Outpatient Medications   Medication Sig    cannabidioL (EPIDIOLEX) 100 mg/mL Take 4 ml by mouth twice a day for seizures.    chlorproMAZINE (THORAZINE) 25 MG tablet Take 1 tablet (25 mg total) by mouth 2 (two) times daily as needed (hiccups).    diazePAM (VALIUM) 10 MG Tab TAKE 1 TABLET BY MOUTH THREE TIMES DAILY    lamoTRIgine (LAMICTAL) 200 MG tablet Take 1 tablet (200 mg total)  by mouth 2 (two) times daily.    PHENobarbitaL 64.8 MG tablet 3 tablets at bedtime for seizures    topiramate (TOPAMAX) 200 MG Tab Take 1 tablet (200 mg total) by mouth 2 (two) times daily.   Last reviewed on 5/2/2023  4:03 PM by BRIAN Morrison    Review of patient's allergies indicates:  No Known Allergies Last reviewed on  5/2/2023 4:02 PM by Asha Wang      Tasks added this encounter   No tasks added.   Tasks due within next 3 months   7/28/2023 - Refill Coordination Outreach (1 time occurrence)     Gill Bales, PharmD  [facility] - [facility]  [address] [facility] [address]  Phone: [phone]  Fax: [phone]

## 2023-08-23 DIAGNOSIS — G40.909 SEIZURE DISORDER: ICD-10-CM

## 2023-08-23 DIAGNOSIS — G40.814 LENNOX-GASTAUT SYNDROME, INTRACTABLE, WITHOUT STATUS EPILEPTICUS: ICD-10-CM

## 2023-08-24 RX ORDER — CANNABIDIOL 100 MG/ML
SOLUTION ORAL
Qty: 240 ML | Refills: 5 | Status: ACTIVE | OUTPATIENT
Start: 2023-08-24 | End: 2024-02-02 | Stop reason: SDUPTHER

## 2023-09-04 ENCOUNTER — PATIENT MESSAGE (OUTPATIENT)
Dept: NEUROLOGY | Facility: CLINIC | Age: 28
End: 2023-09-04
Payer: MEDICAID

## 2023-09-05 NOTE — CONSULTS
[facility]   [facility]    Date of Service: 3/1/17  (Day 1)    Name: Lamont Villarreal  MRN: [id number]   CSN: [id number]      Date of Admission: 2/28/2017  4:53 AM  Consultation was requested by Aisha Vaughn PA-C    HISTORY  The patient is a 21 y.o. M with hx of epilepsy since infancy, followed by  ([facility]), admitted after several witnessed seizures.    History of Present Illness:   22yo M with PMH of epilepsy x age 2 year, developmental delay and autism, presented to outside hospital ED ([facility]) after 2 witnessed seizures on 2/28/17. Per mother, he has been unwell preceding the episodes and had at least 1 episode of vomiting prior arrival in the hospital.  Initial w/u at outside hospital showed positive Strept Throat and negative Flu.   Patient had another seizure en route to [facility] ED (via EMS) - given 1 dose of IV Midazolam (? Dose) and another seizure while in ED = total 4 seizures in 24 hours.    Last visit with  -  4/15/2016.   Per mother patient has been fairly stable with ~ once per month seizures and visits are q  6months or so.    Home AEDs: no prior levels  1) Topiramate 200mg BID  2) Lamotrigine 200mg BID  3) Phenobarbital 64.8mg (3 tabs at bedtime)  4) Diazepam 10mg TID  5) Clonazepam 0.5mg qpm      Date Time   Pt last seen healthy     Pt found down/comatose     NCSE diagnosed        Etiology of NCSE: n/a      Initial AED treatment:  Drug Dose/loading dose Date    Valium      Ativan     Phenytoin/Fosphenytoin     Phenobarbital     Barbiturate Other     Depakone     Lacosamide     Levetiracetam       Subsequent AED treatment:  Vigabatrin   Started    Stop    Dose Max -    Duration of use (#days)      Anesthesia Treatment:  Propofol    Started    Stop    Dose range    Duration of use (#days)    Total drug infusion    Side Effects      Ketamine   Started    Stop    Dose range    Duration of use (#days)    Total drug infusion    Side Effects       Date          Infusion rate (mKm) Propofol           Ketamine            Date          Infusion rate (mKm) Propofol           Ketamine            No prescriptions prior to admission    No Known Allergies   Past Medical History    Diagnosis  Date      Sinus congestion       History reviewed. No pertinent past surgical history.   History reviewed. No pertinent family history.       History    Substance Use Topics      Smoking status:  Unknown If Ever Smoked      Smokeless tobacco:  Not on file      Alcohol Use:  Not on file      Review of Systems:   Review of systems not obtained due to patient factors - comatose.     OBJECTIVE:    Vital Signs (Most Recent)   Vitals:    03/01/17 1141   BP:    Pulse: 79   Resp:    Temp:      Wt Readings from Last 1 Encounters:   02/28/17 0448 42.6 kg (94 lb)       Physical Exam: intubated  General: well developed, well nourished   Head: normocephalic, atraumatic   Eyes: conjunctivae/corneas clear. PERRL.   Nose: Nares normal.   Lungs: clear to auscultation bilaterally, normal respiratory effort and normal percussion bilaterally   Heart: regular rate and rhythm, S1, S2 normal, no murmur, click, rub or gallop   Extremities: no cyanosis or edema, or clubbing   Pulses: 2+ and symmetric   Skin:  No rashes or lesions     Neurologic:   Mental status: comatose   Anesthetic present:   Does not follow commands   Opens eyes spontaneously at times  Moving all extremities   Deep tendon reflexes:  Not tested    Laboratory   BMP:  Recent Labs  Lab 03/01/17  0433   *   K 3.7   *   CO2 18*   BUN 8   CREATININE 0.7   GLU 83   MG 1.7       LFT: Lab Results   Component Value Date    AST 74 (H) 03/01/2017    ALT 52 (H) 03/01/2017    ALKPHOS 93 03/01/2017    BILITOT 1.1 (H) 03/01/2017    ALBUMIN 2.7 (L) 03/01/2017    PROT 5.2 (L) 03/01/2017       CBC:   Lab Results   Component Value Date    WBC 13.31 (H) 03/01/2017    HGB 10.9 (L) 03/01/2017    HCT 32.3 (L) 03/01/2017    MCV 91  03/01/2017     (L) 03/01/2017       Diagnostic Results:   AED levels:   Results for MUNIR MALAVE (MRN [id number]) as of 3/2/2017 14:09   Ref. Range 2/28/2017 05:53   Lamotrigine Lvl Latest Ref Range: 2.0 - 15.0 ug/mL 1.5 (L)     Results for MUNIR MALAVE (MRN [id number]) as of 3/2/2017 14:09   Ref. Range 3/1/2017 18:20   Phenobarbital Latest Ref Range: 15.0 - 40.0 ug/dL 33.6     CT: none  MRI: none    C-EEG, 2/28 - 3/1: no epileptiform activity or seizures    IMPRESSION/ASSESSMENT:    20yo M with hx of intractable long-standing epilepsy and developmental delay, admitted with several clinical seizures - likely from low AED levels  No seizures seen on C-EEG    RECOMMEND:   1) Continue all home AEDs without any dose changes - at risk of status epilepticus  - may need extra dose(s) if low AED levels and any further clinical seizures  2) Management of underlying etiology as per primary team    Discussed plans with  NCC team and patient's mother.     Thank you for involving us in the care of this patient.  Rhianna Patton MD, GENE().  Neurology-Epilepsy.     0 = swallows foods/liquids without difficulty

## 2023-09-25 DIAGNOSIS — G40.909 SEIZURE DISORDER: ICD-10-CM

## 2023-09-26 RX ORDER — DIAZEPAM 10 MG/1
TABLET ORAL
Qty: 90 TABLET | Refills: 5 | Status: SHIPPED | OUTPATIENT
Start: 2023-09-26 | End: 2024-03-15 | Stop reason: SDUPTHER

## 2023-09-26 RX ORDER — PHENOBARBITAL 64.8 MG/1
TABLET ORAL
Qty: 90 TABLET | Refills: 5 | Status: SHIPPED | OUTPATIENT
Start: 2023-09-26 | End: 2024-03-15 | Stop reason: SDUPTHER

## 2023-11-03 DIAGNOSIS — G40.909 SEIZURE DISORDER: ICD-10-CM

## 2023-11-06 ENCOUNTER — TELEPHONE (OUTPATIENT)
Dept: NEUROLOGY | Facility: CLINIC | Age: 28
End: 2023-11-06
Payer: MEDICAID

## 2023-11-06 NOTE — TELEPHONE ENCOUNTER
Yessenia with 's office contacted office to notify [facility] notified  of critical phenobarbital level of 68, 's office contacted Nolberto (mother), Nolberto notified his office neurologist manages that medication and desires to follow up with neurology.  was notified of the phenobarbital level, advised to have patient present for an in office visit tomorrow with BRIAN Dos Santos. Nolberto notified, voices understanding. Nolberto states will keep virtual for tomorrow at 8:30am due to lack of transportation and requests refills for lamotrigine and topiramate. Notified Nolberto keep appointment as scheduled and message forwarded to BRIAN Dos Santos to advise, Nolberto verbalized understanding.

## 2023-11-07 ENCOUNTER — OFFICE VISIT (OUTPATIENT)
Dept: NEUROLOGY | Facility: CLINIC | Age: 28
End: 2023-11-07
Payer: MEDICAID

## 2023-11-07 DIAGNOSIS — R62.50 DEVELOPMENTAL DELAY: ICD-10-CM

## 2023-11-07 DIAGNOSIS — G40.909 SEIZURE DISORDER: Primary | ICD-10-CM

## 2023-11-07 PROCEDURE — 1159F MED LIST DOCD IN RCRD: CPT | Mod: CPTII,95,, | Performed by: PHYSICIAN ASSISTANT

## 2023-11-07 PROCEDURE — 99213 OFFICE O/P EST LOW 20 MIN: CPT | Mod: 95,,, | Performed by: PHYSICIAN ASSISTANT

## 2023-11-07 PROCEDURE — 99213 PR OFFICE/OUTPT VISIT, EST, LEVL III, 20-29 MIN: ICD-10-PCS | Mod: 95,,, | Performed by: PHYSICIAN ASSISTANT

## 2023-11-07 PROCEDURE — 1160F PR REVIEW ALL MEDS BY PRESCRIBER/CLIN PHARMACIST DOCUMENTED: ICD-10-PCS | Mod: CPTII,95,, | Performed by: PHYSICIAN ASSISTANT

## 2023-11-07 PROCEDURE — 1160F RVW MEDS BY RX/DR IN RCRD: CPT | Mod: CPTII,95,, | Performed by: PHYSICIAN ASSISTANT

## 2023-11-07 PROCEDURE — 1159F PR MEDICATION LIST DOCUMENTED IN MEDICAL RECORD: ICD-10-PCS | Mod: CPTII,95,, | Performed by: PHYSICIAN ASSISTANT

## 2023-11-07 RX ORDER — TOPIRAMATE 200 MG/1
200 TABLET ORAL 2 TIMES DAILY
Qty: 60 TABLET | Refills: 6 | Status: SHIPPED | OUTPATIENT
Start: 2023-11-07

## 2023-11-07 RX ORDER — LAMOTRIGINE 200 MG/1
200 TABLET ORAL 2 TIMES DAILY
Qty: 60 TABLET | Refills: 6 | Status: SHIPPED | OUTPATIENT
Start: 2023-11-07
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
## 2024-02-02 DIAGNOSIS — G40.909 SEIZURE DISORDER: ICD-10-CM

## 2024-02-02 DIAGNOSIS — G40.814 LENNOX-GASTAUT SYNDROME, INTRACTABLE, WITHOUT STATUS EPILEPTICUS: ICD-10-CM

## 2024-02-02 RX ORDER — CANNABIDIOL 100 MG/ML
SOLUTION ORAL
Qty: 240 ML | Refills: 5 | Status: CANCELLED | OUTPATIENT
Start: 2024-02-02

## 2024-02-05 RX ORDER — CANNABIDIOL 100 MG/ML
SOLUTION ORAL
Qty: 240 ML | Refills: 5 | Status: ACTIVE | OUTPATIENT
Start: 2024-02-05

## 2024-03-15 DIAGNOSIS — G40.909 SEIZURE DISORDER: ICD-10-CM

## 2024-03-15 RX ORDER — PHENOBARBITAL 64.8 MG/1
TABLET ORAL
Qty: 90 TABLET | Refills: 5 | Status: SHIPPED | OUTPATIENT
Start: 2024-03-15

## 2024-03-15 RX ORDER — DIAZEPAM 10 MG/1
TABLET ORAL
Qty: 90 TABLET | Refills: 5 | Status: SHIPPED | OUTPATIENT
Start: 2024-03-15

## 2024-03-15 NOTE — TELEPHONE ENCOUNTER
----- Message from Sahra Villanueva sent at 3/15/2024  1:54 PM CDT -----  Contact: MOTHER - SARAH Villarreal  MRN: [id number]  : 1995  PCP: Hollis Thomas  Home Phone      [phone]  Work Phone      Not on file.  Mobile          [phone]      MESSAGE: Patient needs refills on Phenobarbital 64.8 mg, 3 at night & Diazepam 10 mg 3 daily sent to [facility].        Phone: [phone]

## 2024-05-22 DIAGNOSIS — G40.909 SEIZURE DISORDER: ICD-10-CM

## 2024-05-22 RX ORDER — LAMOTRIGINE 200 MG/1
200 TABLET ORAL 2 TIMES DAILY
Qty: 60 TABLET | Refills: 0 | Status: SHIPPED | OUTPATIENT
Start: 2024-05-22 | End: 2024-06-14 | Stop reason: SDUPTHER

## 2024-05-22 RX ORDER — TOPIRAMATE 200 MG/1
200 TABLET ORAL 2 TIMES DAILY
Qty: 60 TABLET | Refills: 0 | Status: SHIPPED | OUTPATIENT
Start: 2024-05-22 | End: 2024-06-14 | Stop reason: SDUPTHER

## 2024-06-08 NOTE — TELEPHONE ENCOUNTER
----- Message from Veronica Layton sent at 5/4/2018  1:25 PM CDT -----  Contact: Pt [phone]  Needs Medical Advice    Who Called: Pt [phone]    Symptoms (please be specific):    How long has patient had these symptoms:    Pharmacy name and phone # if needed:    Communication Preference (MyChart response to Pt. (or) Call Back # and timeframe):    Additional Information:  Pt calling to schedule an adult Pt appt. Please call to advise  
MA telephone mom to help schedule appt  MA left voicemail requesting a call back   
No.

## 2024-06-14 ENCOUNTER — OFFICE VISIT (OUTPATIENT)
Dept: NEUROLOGY | Facility: CLINIC | Age: 29
End: 2024-06-14
Payer: MEDICAID

## 2024-06-14 ENCOUNTER — TELEPHONE (OUTPATIENT)
Dept: NEUROLOGY | Facility: CLINIC | Age: 29
End: 2024-06-14

## 2024-06-14 DIAGNOSIS — G40.909 SEIZURE DISORDER: Primary | ICD-10-CM

## 2024-06-14 DIAGNOSIS — R62.50 DEVELOPMENTAL DELAY: ICD-10-CM

## 2024-06-14 DIAGNOSIS — F84.0 AUTISM: ICD-10-CM

## 2024-06-14 PROCEDURE — 1159F MED LIST DOCD IN RCRD: CPT | Mod: CPTII,95,, | Performed by: PHYSICIAN ASSISTANT

## 2024-06-14 PROCEDURE — 99213 OFFICE O/P EST LOW 20 MIN: CPT | Mod: 95,,, | Performed by: PHYSICIAN ASSISTANT

## 2024-06-14 PROCEDURE — 1160F RVW MEDS BY RX/DR IN RCRD: CPT | Mod: CPTII,95,, | Performed by: PHYSICIAN ASSISTANT

## 2024-06-14 RX ORDER — LAMOTRIGINE 200 MG/1
200 TABLET ORAL 2 TIMES DAILY
Qty: 60 TABLET | Refills: 11 | Status: SHIPPED | OUTPATIENT
Start: 2024-06-14

## 2024-06-14 RX ORDER — TOPIRAMATE 200 MG/1
200 TABLET ORAL 2 TIMES DAILY
Qty: 60 TABLET | Refills: 11 | Status: SHIPPED | OUTPATIENT
Start: 2024-06-14
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
## 2024-06-14 NOTE — TELEPHONE ENCOUNTER
In-basket referral message sent to [facility] Neurology Epilepsy Dept Clinical Support to contact patient to schedule appointment.

## 2024-07-26 DIAGNOSIS — G40.909 SEIZURE DISORDER: ICD-10-CM

## 2024-07-26 DIAGNOSIS — G40.814 LENNOX-GASTAUT SYNDROME, INTRACTABLE, WITHOUT STATUS EPILEPTICUS: ICD-10-CM

## 2024-07-26 RX ORDER — CANNABIDIOL 100 MG/ML
SOLUTION ORAL
Qty: 240 ML | Refills: 5 | Status: ACTIVE | OUTPATIENT
Start: 2024-07-26

## 2024-08-12 ENCOUNTER — TELEPHONE (OUTPATIENT)
Dept: NEUROLOGY | Facility: CLINIC | Age: 29
End: 2024-08-12
Payer: MEDICAID

## 2024-08-12 NOTE — TELEPHONE ENCOUNTER
----- Message from Randi Dos Santos RN sent at 2024  4:27 PM CDT -----  Contact: MOTHER - SARAH    ----- Message -----  From: Sahra Villanueva  Sent: 2024   3:41 PM CDT  To: Felipe BEAUCHAMP Staff    Lamont Villarreal  MRN: [id number]  : 1995  PCP: Hollis Thomas  Home Phone      [phone]  Work Phone      Not on file.  Mobile          [phone]      MESSAGE: Mother states that she is needing a letter giving all of his dates he was seen by Asha for the last 3 months for her food stamps.  She is needing it faxed to [phone].  Please make sure that the case ID #[id number] is at the top of the letter.          Phone: [phone]

## 2024-08-13 NOTE — TELEPHONE ENCOUNTER
Spoke with Randi BROWER, she advised to have the patients mother to have the request sent to our medical records department. Spoke with patients mom, she declined, stating he only saw BRIAN Barry once in the last three months.

## 2024-09-09 DIAGNOSIS — G40.909 SEIZURE DISORDER: ICD-10-CM

## 2024-09-09 RX ORDER — DIAZEPAM 10 MG/1
TABLET ORAL
Qty: 90 TABLET | Refills: 5 | Status: SHIPPED | OUTPATIENT
Start: 2024-09-09

## 2024-09-09 NOTE — TELEPHONE ENCOUNTER
----- Message from Polly Peña sent at 2024  3:46 PM CDT -----  Contact: Mother, Nolberto Villarreal  MRN: [id number]  : 1995  PCP: Hollis Thomas  Home Phone      [phone]  Work Phone      Not on file.  Mobile          [phone]      MESSAGE: Patient is needing a new prescription for diazePAM 10 mg and needs this filled as soon as possible due to his seizures. He was a patient of Asha.     PHONE; [phone]

## 2024-09-09 NOTE — TELEPHONE ENCOUNTER
"Last office with BRIAN Dos Santos "I will refer to epileptology for follow up." Due to no upcoming appointment, please advise. Referral message sent via in-basket again today.  "

## 2024-10-02 DIAGNOSIS — G40.909 SEIZURE DISORDER: ICD-10-CM

## 2024-10-02 NOTE — TELEPHONE ENCOUNTER
Patients mother states that he was never contacted regarding an appointment with Epileptology and is in need of refills of Phenobarbital as he only has enough medication for tonight's dose. I gave her the number to contact Epileptology at [facility]. Please advise.

## 2024-10-02 NOTE — TELEPHONE ENCOUNTER
----- Message from Sahra sent at 10/2/2024  2:35 PM CDT -----  Contact: MOTHER - SARAH Villarreal  MRN: [id number]  : 1995  PCP: Hollis Thomas  Home Phone      [phone]  Work Phone      Not on file.  Mobile          [phone]      MESSAGE: Patient needs a refill on Phenobarbital 64.8 mg, 3 at bedtime sent to [facility].  Patient is also a previous patient of [email or web address] and is needing to make a follow up appointment.        Phone: [phone]

## 2024-10-03 RX ORDER — PHENOBARBITAL 64.8 MG/1
TABLET ORAL
Qty: 90 TABLET | Refills: 5 | Status: SHIPPED | OUTPATIENT
Start: 2024-10-03

## 2025-01-27 DIAGNOSIS — G40.909 SEIZURE DISORDER: ICD-10-CM

## 2025-01-27 DIAGNOSIS — G40.814 LENNOX-GASTAUT SYNDROME, INTRACTABLE, WITHOUT STATUS EPILEPTICUS: ICD-10-CM

## 2025-01-27 RX ORDER — CANNABIDIOL 100 MG/ML
SOLUTION ORAL
Qty: 240 ML | Refills: 4 | Status: ACTIVE | OUTPATIENT
Start: 2025-01-27

## 2025-01-27 NOTE — TELEPHONE ENCOUNTER
Patients mother states that she has been unable to find an epileptologist who will take the patients insurance. I told her that I will refax the referral to [facility]. In the meantime, I have scheduled the patient to see Dr Vicente in May 2025. Mother states that the patient will be out of Epidiolex soon and is requesting refills. Please advise.

## 2025-01-27 NOTE — TELEPHONE ENCOUNTER
----- Message from Sahra sent at 2025  3:38 PM CST -----  Contact: MOTHER - SARAH Villarreal  MRN: [id number]  : 1995  PCP: Hollis Thomas  Home Phone      [phone]  Work Phone      Not on file.  Mobile          [phone]      MESSAGE: Mother states that she is having a hard time to find a physician that Asha was wanting the patient to see that takes his insurance.  She also says that she is needing to make an appointment with someone in this office in order for the patient to be able to get his medications.        Phone: [phone]

## 2025-02-25 DIAGNOSIS — G40.909 SEIZURE DISORDER: ICD-10-CM

## 2025-02-27 RX ORDER — DIAZEPAM 10 MG/1
10 TABLET ORAL 3 TIMES DAILY
Qty: 90 TABLET | Refills: 0 | Status: SHIPPED | OUTPATIENT
Start: 2025-02-27

## 2025-03-31 DIAGNOSIS — G40.909 SEIZURE DISORDER: ICD-10-CM

## 2025-03-31 DIAGNOSIS — G40.814 LENNOX-GASTAUT SYNDROME, INTRACTABLE, WITHOUT STATUS EPILEPTICUS: ICD-10-CM

## 2025-04-01 RX ORDER — DIAZEPAM 10 MG/1
10 TABLET ORAL 3 TIMES DAILY
Qty: 90 TABLET | Refills: 0 | Status: SHIPPED | OUTPATIENT
Start: 2025-04-01

## 2025-04-01 RX ORDER — CANNABIDIOL 100 MG/ML
SOLUTION ORAL
Qty: 240 ML | Refills: 0 | Status: SHIPPED | OUTPATIENT
Start: 2025-04-01

## 2025-04-01 RX ORDER — PHENOBARBITAL 64.8 MG/1
TABLET ORAL
Qty: 90 TABLET | Refills: 0 | Status: SHIPPED | OUTPATIENT
Start: 2025-04-01

## 2025-04-01 NOTE — TELEPHONE ENCOUNTER
----- Message from Sahra sent at 4/1/2025  7:09 AM CDT -----  Contact: MOTHER - SARAH DomingueztMRN: [id number]: 1995PCP: Hollis Thomas Phone      [phone] Phone      Not on file.Mobile          [phone]: Patient is needing a refill on diazePAM (VALIUM) 10 MG, TID, cannabidioL (EPIDIOLEX) 100 mg/mL, 4 ml BID & PHENobarbitaL 64.8 MG, 3 at bedtime sent to [facility].  Mother states that the patient does not have an appointment until May.Phone: [phone]

## 2025-04-02 ENCOUNTER — TELEPHONE (OUTPATIENT)
Dept: NEUROLOGY | Facility: CLINIC | Age: 30
End: 2025-04-02
Payer: MEDICAID

## 2025-04-02 NOTE — TELEPHONE ENCOUNTER
----- Message from Sahra sent at 4/2/2025  9:07 AM CDT -----  Contact: [facility]  Lamont FERNANDEZ BonnieRN: [id number]: 1995PCP: Hollis Thomas Phone      [phone] Phone      Not on file.Mobile          [phone]: The pharmacy is needing a clarification on the prescription for cannabidioL (EPIDIOLEX) 100 mg/mL.Phone: [phone]

## 2025-04-02 NOTE — TELEPHONE ENCOUNTER
Returned call to [facility], cannabidioL (Kira TalentIOLEX) 100 mg/mL clarified/verified.

## 2025-04-23 DIAGNOSIS — G40.814 LENNOX-GASTAUT SYNDROME, INTRACTABLE, WITHOUT STATUS EPILEPTICUS: ICD-10-CM

## 2025-04-23 DIAGNOSIS — G40.909 SEIZURE DISORDER: ICD-10-CM

## 2025-04-24 RX ORDER — CANNABIDIOL 100 MG/ML
SOLUTION ORAL
Qty: 240 ML | Refills: 0 | Status: ACTIVE | OUTPATIENT
Start: 2025-04-24

## 2025-04-29 DIAGNOSIS — G40.909 SEIZURE DISORDER: ICD-10-CM

## 2025-04-29 RX ORDER — DIAZEPAM 10 MG/1
10 TABLET ORAL 3 TIMES DAILY
Qty: 45 TABLET | Refills: 0 | Status: SHIPPED | OUTPATIENT
Start: 2025-04-29

## 2025-04-29 RX ORDER — PHENOBARBITAL 64.8 MG/1
TABLET ORAL
Qty: 90 TABLET | Refills: 0 | Status: SHIPPED | OUTPATIENT
Start: 2025-04-29

## 2025-04-29 NOTE — TELEPHONE ENCOUNTER
----- Message from Sahra sent at 4/29/2025  4:01 PM CDT -----  Contact: MOTHER - SARAH FERNANDEZ DuetMRN: [id number]: 1995PCP: Hollis Thomas Phone      [phone] Phone      Not on file.Mobile          [phone]: Patient is needing a refill on Diazepam & [facility]obarbital [facility].Phone: [phone]

## 2025-05-07 ENCOUNTER — OFFICE VISIT (OUTPATIENT)
Dept: NEUROLOGY | Facility: CLINIC | Age: 30
End: 2025-05-07
Payer: MEDICAID

## 2025-05-07 VITALS
SYSTOLIC BLOOD PRESSURE: 100 MMHG | HEART RATE: 76 BPM | DIASTOLIC BLOOD PRESSURE: 60 MMHG | HEIGHT: 60 IN | BODY MASS INDEX: 17.58 KG/M2 | OXYGEN SATURATION: 98 %

## 2025-05-07 DIAGNOSIS — G40.909 SEIZURE DISORDER: ICD-10-CM

## 2025-05-07 DIAGNOSIS — R62.50 DEVELOPMENTAL DELAY: ICD-10-CM

## 2025-05-07 DIAGNOSIS — G40.309 NONINTRACTABLE GENERALIZED IDIOPATHIC EPILEPSY WITHOUT STATUS EPILEPTICUS: ICD-10-CM

## 2025-05-07 DIAGNOSIS — G40.814 LENNOX-GASTAUT SYNDROME, INTRACTABLE, WITHOUT STATUS EPILEPTICUS: Primary | ICD-10-CM

## 2025-05-07 DIAGNOSIS — G80.9 CEREBRAL PALSY, UNSPECIFIED TYPE: ICD-10-CM

## 2025-05-07 PROCEDURE — 1159F MED LIST DOCD IN RCRD: CPT | Mod: CPTII,,, | Performed by: STUDENT IN AN ORGANIZED HEALTH CARE EDUCATION/TRAINING PROGRAM

## 2025-05-07 PROCEDURE — 3008F BODY MASS INDEX DOCD: CPT | Mod: CPTII,,, | Performed by: STUDENT IN AN ORGANIZED HEALTH CARE EDUCATION/TRAINING PROGRAM

## 2025-05-07 PROCEDURE — 3078F DIAST BP <80 MM HG: CPT | Mod: CPTII,,, | Performed by: STUDENT IN AN ORGANIZED HEALTH CARE EDUCATION/TRAINING PROGRAM

## 2025-05-07 PROCEDURE — 99214 OFFICE O/P EST MOD 30 MIN: CPT | Mod: S$PBB,,, | Performed by: STUDENT IN AN ORGANIZED HEALTH CARE EDUCATION/TRAINING PROGRAM

## 2025-05-07 PROCEDURE — 3074F SYST BP LT 130 MM HG: CPT | Mod: CPTII,,, | Performed by: STUDENT IN AN ORGANIZED HEALTH CARE EDUCATION/TRAINING PROGRAM

## 2025-05-07 PROCEDURE — 99213 OFFICE O/P EST LOW 20 MIN: CPT | Mod: PBBFAC | Performed by: STUDENT IN AN ORGANIZED HEALTH CARE EDUCATION/TRAINING PROGRAM

## 2025-05-07 PROCEDURE — 99999 PR PBB SHADOW E&M-EST. PATIENT-LVL III: CPT | Mod: PBBFAC,,, | Performed by: STUDENT IN AN ORGANIZED HEALTH CARE EDUCATION/TRAINING PROGRAM

## 2025-05-07 RX ORDER — PHENOBARBITAL 64.8 MG/1
TABLET ORAL
Qty: 90 TABLET | Refills: 5 | Status: SHIPPED | OUTPATIENT
Start: 2025-05-07

## 2025-05-07 RX ORDER — CLINDAMYCIN HYDROCHLORIDE 300 MG/1
300 CAPSULE ORAL 3 TIMES DAILY
COMMUNITY
Start: 2025-05-06

## 2025-05-07 RX ORDER — CANNABIDIOL 100 MG/ML
SOLUTION ORAL
Qty: 240 ML | Refills: 6 | Status: SHIPPED | OUTPATIENT
Start: 2025-05-07

## 2025-05-07 RX ORDER — DIAZEPAM 10 MG/1
10 TABLET ORAL 3 TIMES DAILY
Qty: 45 TABLET | Refills: 5 | Status: SHIPPED | OUTPATIENT
Start: 2025-05-07

## 2025-05-07 RX ORDER — LAMOTRIGINE 200 MG/1
200 TABLET ORAL 2 TIMES DAILY
Qty: 60 TABLET | Refills: 11 | Status: SHIPPED | OUTPATIENT
Start: 2025-05-07

## 2025-05-07 RX ORDER — TOPIRAMATE 200 MG/1
200 TABLET, FILM COATED ORAL 2 TIMES DAILY
Qty: 60 TABLET | Refills: 11 | Status: SHIPPED | OUTPATIENT
Start: 2025-05-07

## 2025-05-07 NOTE — PROGRESS NOTES
[facility] - [facility]UROLOGY  [facility], [place]    Date: 5/7/25  Patient Name: Lamont Villarreal   MRN: [id number]   PCP: Hollis Thomas  Referring Provider: No ref. provider found    Assessment:   Lamont Villarreal is a 30 y.o. male presenting for LGS follow up. Here to establish with a new provider. Average of 1 seizure a month, particularly susceptible to seizures when he has a fever. Will continue same doses of his medications. Will send referral to PT per mother's request. We will plan a follow up in 6 months from now. They verbalized understanding and agreed with the plan     Plan:     - Continue same dose of diazepam, lamotrigine, phenobarbital, topamax, and epidiolex   - Seizure precautions   - Follow up 6 months    Problem List Items Addressed This Visit          Neuro    Epilepsy    Developmental delay    Lennox-Gastaut syndrome, intractable, without status epilepticus - Primary    Relevant Medications    cannabidioL (EPIDIOLEX) 100 mg/mL     Other Visit Diagnoses         Seizure disorder        Relevant Medications    cannabidioL (EPIDIOLEX) 100 mg/mL    diazePAM (VALIUM) 10 MG Tab    lamoTRIgine (LAMICTAL) 200 MG tablet    PHENobarbitaL 64.8 MG tablet    topiramate (TOPAMAX) 200 MG Tab      Cerebral palsy, unspecified type        Relevant Orders    Ambulatory referral/consult to Physical/Occupational Therapy          Semaj Vicente MD    Subjective:   Patient seen in consultation at the request of No ref. provider found for the evaluation of epilepsy. A copy of this note will be sent to the referring physician.      HPI 5/7/25:   Mr. Lamont Villarreal is a 30 y.o. male presenting for epilepsy. He has a history of LGS. Here to establish with a new provider. Average of 1 seizure a month, particularly susceptible to seizures when he has a fever. He is currently on 5 AEDs and has been stable for years. Previously seen by Asha     PAST MEDICAL HISTORY:  Past Medical History:    Diagnosis Date    Developmental delay     Mental disorder     Seizures        PAST SURGICAL HISTORY:  History reviewed. No pertinent surgical history.    CURRENT MEDS:  Current Medications[1]    ALLERGIES:  Review of patient's allergies indicates:  No Known Allergies    FAMILY HISTORY:  No family history on file.    SOCIAL HISTORY:  Social History[2]    Review of Systems:  12 system review of systems is negative except for the symptoms mentioned in HPI.      Objective:     Vitals:    05/07/25 1512   BP: 100/60   BP Location: Right arm   Patient Position: Sitting   Pulse: 76   SpO2: 98%   Height: 5' (1.524 m)     General: NAD, well nourished   Eyes: no tearing, discharge, no erythema   ENT: moist mucous membranes of the oral cavity, nares patent    Neck: Supple, full range of motion  Cardiovascular: Warm and well perfused, pulses equal and symmetrical  Lungs: Normal work of breathing, normal chest wall excursions  Skin: No rash, lesions, or breakdown on exposed skin  Psychiatry: Mood and affect are appropriate   Abdomen: soft, non tender, non distended  Extremeties: No cyanosis, clubbing or edema.    Neurological   Alert but not verbal. Generalized spasticity     I spent a total of 30 minutes on the day of the visit.   This includes face to face time and non-face to face time preparing to see the patient (eg, review of tests), obtaining and/or reviewing separately obtained history, documenting clinical information in the electronic or other health record, independently interpreting results and communicating results to the patient/family/caregiver, or care coordinator.      Semaj Vicente MD  Neurology           [1]   Current Outpatient Medications   Medication Sig Dispense Refill    clindamycin (CLEOCIN) 300 MG capsule Take 300 mg by mouth 3 (three) times daily.      cannabidioL (EPIDIOLEX) 100 mg/mL Take 4 ml by mouth twice a day for seizures. 240 mL 6    diazePAM (VALIUM) 10 MG Tab Take 1 tablet (10 mg total)  by mouth 3 (three) times daily. 45 tablet 5    lamoTRIgine (LAMICTAL) 200 MG tablet Take 1 tablet (200 mg total) by mouth 2 (two) times daily. 60 tablet 11    PHENobarbitaL 64.8 MG tablet TAKE 3 TABLETS BY MOUTH AT BEDTIME FOR SEIZURES 90 tablet 5    topiramate (TOPAMAX) 200 MG Tab Take 1 tablet (200 mg total) by mouth 2 (two) times daily. 60 tablet 11     No current facility-administered medications for this visit.   [2]   Social History  Tobacco Use    Smoking status: Never    Smokeless tobacco: Never   Substance Use Topics    Alcohol use: No

## 2025-05-22 DIAGNOSIS — G40.909 SEIZURE DISORDER: ICD-10-CM

## 2025-05-22 DIAGNOSIS — G40.814 LENNOX-GASTAUT SYNDROME, INTRACTABLE, WITHOUT STATUS EPILEPTICUS: ICD-10-CM

## 2025-05-22 RX ORDER — CANNABIDIOL 100 MG/ML
SOLUTION ORAL
Qty: 240 ML | Refills: 6 | Status: SHIPPED | OUTPATIENT
Start: 2025-05-22

## 2025-05-22 RX ORDER — CANNABIDIOL 100 MG/ML
SOLUTION ORAL
Qty: 240 ML | Refills: 6 | Status: SHIPPED | OUTPATIENT
Start: 2025-05-22 | End: 2025-05-22

## 2025-08-05 DIAGNOSIS — G40.909 SEIZURE DISORDER: ICD-10-CM

## 2025-08-05 RX ORDER — DIAZEPAM 10 MG/1
10 TABLET ORAL 3 TIMES DAILY
Qty: 90 TABLET | Refills: 5 | Status: SHIPPED | OUTPATIENT
Start: 2025-08-05